# Patient Record
Sex: MALE | Race: WHITE | NOT HISPANIC OR LATINO | Employment: FULL TIME | URBAN - METROPOLITAN AREA
[De-identification: names, ages, dates, MRNs, and addresses within clinical notes are randomized per-mention and may not be internally consistent; named-entity substitution may affect disease eponyms.]

---

## 2017-03-03 ENCOUNTER — TELEPHONE (OUTPATIENT)
Dept: FAMILY MEDICINE | Facility: CLINIC | Age: 47
End: 2017-03-03

## 2017-03-07 NOTE — TELEPHONE ENCOUNTER
PA for Relpax was approved.  Pharmacy notified.     CaseId:[id number];Product Name:ST: Triptan (Brand) NoGF - Imitrex tablets, Amerge, Zomig tablets, Zomig ZMT, Maxalt, Maxalt MLT, Axert, Frova, Relpax, Treximet - LILY;Status:Approved;Coverage Start Date:02/04/2017;Coverage End Date:03/06/2018;

## 2017-08-22 DIAGNOSIS — G43.809 OTHER TYPE OF MIGRAINE: ICD-10-CM

## 2017-08-22 NOTE — TELEPHONE ENCOUNTER
Relpax      Last Written Prescription Date: 09/01/2016  Last Fill Quantity: 18, # refills: 11  Last Office Visit with [facility], [facility]MP or  Health prescribing provider: 09/01/2016  Next 5 appointments (look out 90 days)     Aug 28, 2017  4:20 PM CDT   SHORT with PILAR Don MD   [facility] ([facility])    [address] [address]   [phone]                   BP Readings from Last 3 Encounters:   09/01/16 126/82   01/12/16 (!) 135/94   08/03/15 110/80     Austin GREENE (R)

## 2017-08-24 RX ORDER — ELETRIPTAN HYDROBROMIDE 40 MG/1
TABLET, FILM COATED ORAL
Qty: 18 TABLET | Refills: 11 | Status: SHIPPED | OUTPATIENT
Start: 2017-08-24 | End: 2018-04-16

## 2017-08-28 ENCOUNTER — OFFICE VISIT (OUTPATIENT)
Dept: FAMILY MEDICINE | Facility: CLINIC | Age: 47
End: 2017-08-28
Payer: COMMERCIAL

## 2017-08-28 VITALS
DIASTOLIC BLOOD PRESSURE: 70 MMHG | HEART RATE: 64 BPM | BODY MASS INDEX: 28.14 KG/M2 | HEIGHT: 71 IN | WEIGHT: 201 LBS | SYSTOLIC BLOOD PRESSURE: 104 MMHG

## 2017-08-28 DIAGNOSIS — G43.809 OTHER TYPE OF MIGRAINE: ICD-10-CM

## 2017-08-28 DIAGNOSIS — I10 ESSENTIAL HYPERTENSION WITH GOAL BLOOD PRESSURE LESS THAN 140/90: ICD-10-CM

## 2017-08-28 PROCEDURE — 99214 OFFICE O/P EST MOD 30 MIN: CPT | Performed by: FAMILY MEDICINE

## 2017-08-28 RX ORDER — LOSARTAN POTASSIUM 100 MG/1
100 TABLET ORAL DAILY
Qty: 90 TABLET | Refills: 3 | Status: SHIPPED | OUTPATIENT
Start: 2017-08-28 | End: 2018-04-16

## 2017-08-28 RX ORDER — ELETRIPTAN HYDROBROMIDE 40 MG/1
TABLET, FILM COATED ORAL
Qty: 18 TABLET | Refills: 11 | Status: CANCELLED | OUTPATIENT
Start: 2017-08-28

## 2017-08-28 RX ORDER — HYDROCODONE BITARTRATE AND ACETAMINOPHEN 10; 325 MG/1; MG/1
1 TABLET ORAL EVERY 6 HOURS PRN
Qty: 28 TABLET | Refills: 0 | Status: SHIPPED | OUTPATIENT
Start: 2017-08-28 | End: 2018-04-16

## 2017-08-28 NOTE — PROGRESS NOTES
"  SUBJECTIVE:   Jossue Torres is a 47 year old male who presents to clinic today for the following health issues:      Hypertension Follow-up      Outpatient blood pressures are not being checked.    Low Salt Diet: not monitoring salt        Amount of exercise or physical activity: None    Problems taking medications regularly: No    Medication side effects: none  Diet: regular (no restrictions)      PROBLEMS TO ADD ON...  Migraine headache: The Relpax continues to work well as needed when he gets a headache. For some reason he had more than the usual amount of difficulty in July and had used all 18 of the Relpax tablets up before the month was out. He had some leftover hydrocodone from the prescription I gave him a year ago and that took care of the headaches. It seems to him that if he uses the Relpax frequently it loses its effectiveness somewhat. Then if he takes a break from it it'll work again      Problem list and histories reviewed & adjusted, as indicated.  Additional history: none        Reviewed and updated as needed this visit by clinical staffTobacco  Allergies  Med Hx  Surg Hx  Fam Hx  Soc Hx      Reviewed and updated as needed this visit by Provider               ROS:  Constitutional, HEENT, cardiovascular, pulmonary, gi and gu systems are negative, except as otherwise noted.          OBJECTIVE:                                                    /70 (BP Location: Right arm, Patient Position: Chair, Cuff Size: Adult Regular)  Pulse 64  Ht 5' 11\" (1.803 m)  Wt 201 lb (91.2 kg)  BMI 28.03 kg/m2    GENERAL: healthy, alert and no distress  EYES: Eyes grossly normal to inspection, extraocular movements - intact, and PERRL  NECK: no tenderness, no adenopathy, no asymmetry, no masses, no stiffness; thyroid- normal to palpation  RESP: lungs clear to auscultation - no rales, no rhonchi, no wheezes  CV: regular rates and rhythm, normal S1 S2, no S3 or S4 and no murmur, no click or rub -  MS: " extremities- no gross deformities noted, no edema  NEURO: strength and tone- normal, sensory exam- grossly normal, mentation- intact, speech- normal, reflexes- symmetric  PSYCH: Alert and oriented times 3; coherent speech, normal rate and volume, able to articulate logical thoughts, able to abstract reason, no tangential thoughts, no hallucinations or delusions. Affect is normal.         ASSESSMENT/PLAN:                                                    ASSESSMENT:  1. Other type of migraine    2. Essential hypertension with goal blood pressure less than 140/90        PLAN:  Orders Placed This Encounter     losartan (COZAAR) 100 MG tablet     HYDROcodone-acetaminophen (NORCO)  MG per tablet   Refills on the Relpax were given last week. Recheck in one year      PILAR Gardner [facility]

## 2017-08-28 NOTE — MR AVS SNAPSHOT
"              After Visit Summary   2017    Jossue Torres    MRN: [id number]           Patient Information     Date Of Birth          1970        Visit Information        Provider Department      2017 4:20 PM Florencia, PILAR Gardner MD [facility]        Today's Diagnoses     Other type of migraine        Essential hypertension with goal blood pressure less than 140/90           Follow-ups after your visit        Who to contact     If you have questions or need follow up information about today's clinic visit or your schedule please contact [facility] directly at [phone].  Normal or non-critical lab and imaging results will be communicated to you by Co[employer], letter or phone within 4 business days after the clinic has received the results. If you do not hear from us within 7 days, please contact the clinic through [employer]Prom[employer] or phone. If you have a critical or abnormal lab result, we will notify you by phone as soon as possible.  Submit refill requests through [employer] or call your pharmacy and they will forward the refill request to us. Please allow 3 business days for your refill to be completed.          Additional Information About Your Visit        MyChart Information     [employer] lets you send messages to your doctor, view your test results, renew your prescriptions, schedule appointments and more. To sign up, go to [email or web address] . Click on \"Log in\" on the left side of the screen, which will take you to the Welcome page. Then click on \"Sign up Now\" on the right side of the page.     You will be asked to enter the access code listed below, as well as some personal information. Please follow the directions to create your username and password.     Your access code is: [email or web address]  Expires: 2017  4:59 PM     Your access code will  in 90 days. If you need help or a new code, please call your [facility] or [phone].        Care EveryWhere ID     " "This is your Care EveryWhere ID. This could be used by other organizations to access your [place] medical records  [id number]        Your Vitals Were     Pulse Height BMI (Body Mass Index)             64 5' 11\" (1.803 m) 28.03 kg/m2          Blood Pressure from Last 3 Encounters:   08/28/17 104/70   09/01/16 126/82   01/12/16 (!) 135/94    Weight from Last 3 Encounters:   08/28/17 201 lb (91.2 kg)   09/01/16 196 lb (88.9 kg)   01/12/16 193 lb (87.5 kg)              Today, you had the following     No orders found for display         Where to get your medicines      These medications [facility] [place] [facility] [address]  [address], [place] [address]    Hours:  TA[place] Phone:  [phone]     losartan 100 MG tablet         Some of these will need a paper prescription and others can be bought over the counter.  Ask your nurse if you have questions.     Bring a paper prescription for each of these medications     HYDROcodone-acetaminophen  MG per tablet          Primary Care Provider Office Phone # Fax #    R Jj Don [phone] [address] [address]        Equal Access to Services     MIGUEL ANGEL REYNOLDS AH: Hadii suki mendoza hadasho Soomaali, waaxda luqadaha, qaybta kaalmada adeegyada, jf quiñones hayjovani snider. So [facility] [place] [phone].    ATENCIÓN: Si habla español, tiene a johnson disposición servicios gratuitos de asistencia lingüística. Llame al [phone].    We comply with applicable federal civil rights laws and [place] laws. We do not discriminate on the basis of race, color, national origin, age, disability sex, sexual orientation or gender identity.            Thank you!     Thank you for choosing [facility]  for your care. Our goal is always to provide you with excellent care. Hearing back from our patients is one way we can continue to improve our services. " Please take a few minutes to complete the written survey that you may receive in the mail after your visit with us. Thank you!             Your Updated Medication List - Protect others around you: Learn how to safely use, store and throw away your medicines at www.disposemymeds.org.          This list is accurate as of: 8/28/17  4:59 PM.  Always use your most recent med list.                   Brand Name Dispense Instructions for use Diagnosis    eletriptan 40 MG tablet    RELPAX    18 tablet    Take 1 tablet by mouth. repeat after 2 hours if needed.    Other type of migraine       HYDROcodone-acetaminophen  MG per tablet    NORCO    28 tablet    Take 1 tablet by mouth every 6 hours as needed for pain    Other type of migraine       losartan 100 MG tablet    COZAAR    90 tablet    Take 1 tablet (100 mg) by mouth daily    Essential hypertension with goal blood pressure less than 140/90       SUDAFED 12 HOUR PO      Take  by mouth.        triamcinolone 0.1 % cream    KENALOG    80 g    Apply  topically 2 times daily.    Psoriasis       TYLENOL 325 MG tablet   Generic drug:  acetaminophen      2 TABLETS EVERY 4 HOURS AS NEEDED

## 2017-08-28 NOTE — NURSING NOTE
"Chief Complaint   Patient presents with     Hypertension     Headache     migraines       Initial /70 (BP Location: Right arm, Patient Position: Chair, Cuff Size: Adult Regular)  Pulse 64  Ht 5' 11\" (1.803 m)  Wt 201 lb (91.2 kg)  BMI 28.03 kg/m2 Estimated body mass index is 28.03 kg/(m^2) as calculated from the following:    Height as of this encounter: 5' 11\" (1.803 m).    Weight as of this encounter: 201 lb (91.2 kg).  Medication Reconciliation: complete     Iliana Guillen, CMA      "

## 2017-09-08 ENCOUNTER — MEDICAL CORRESPONDENCE (OUTPATIENT)
Dept: HEALTH INFORMATION MANAGEMENT | Facility: CLINIC | Age: 47
End: 2017-09-08

## 2017-09-28 ENCOUNTER — TELEPHONE (OUTPATIENT)
Dept: FAMILY MEDICINE | Facility: CLINIC | Age: 47
End: 2017-09-28

## 2017-09-28 NOTE — TELEPHONE ENCOUNTER
Patient states his neck pain started 2 days ago.  Was mild and then today his neck pain is more painful and noticeable.  He states the pain is located in the muscle group in his neck.  Pain comes and goes.  Pain when turning head.  He states he may have slept on it wrong.  Denies fevers, radiating pain, chest pain, difficulties breathing, numbness/tingling, n/v, palpitations or any other symptoms at this time.    Appt scheduled.  RN advised of s/s that would warrant patient going to ED.  Patient agrees with plan and verbalized understanding.    Claudia CRABTREE RN

## 2017-09-28 NOTE — TELEPHONE ENCOUNTER
Reason for call:  Patient reporting a symptom    Symptom or request: Pt has had neck pain X 2 days and would like to see a provider in clinic today.      Duration (how long have symptoms been present): 2 days    Have you been treated for this before? No    Additional comments:     Phone Number patient can be reached at:  Home number on file [phone] (home)    Best Time:  any    Can we leave a detailed message on this number:  YES    Call taken on 9/28/2017 at 9:52 AM by Renee Pablo

## 2017-09-29 ENCOUNTER — OFFICE VISIT (OUTPATIENT)
Dept: FAMILY MEDICINE | Facility: CLINIC | Age: 47
End: 2017-09-29
Payer: COMMERCIAL

## 2017-09-29 VITALS
DIASTOLIC BLOOD PRESSURE: 88 MMHG | TEMPERATURE: 98.6 F | BODY MASS INDEX: 28 KG/M2 | WEIGHT: 200 LBS | HEART RATE: 75 BPM | HEIGHT: 71 IN | SYSTOLIC BLOOD PRESSURE: 122 MMHG

## 2017-09-29 DIAGNOSIS — S13.9XXA SPRAIN OF NECK, INITIAL ENCOUNTER: Primary | ICD-10-CM

## 2017-09-29 PROCEDURE — 99213 OFFICE O/P EST LOW 20 MIN: CPT | Performed by: FAMILY MEDICINE

## 2017-09-29 RX ORDER — OMEGA-3 FATTY ACIDS/FISH OIL 300-1000MG
200 CAPSULE ORAL EVERY 4 HOURS PRN
Status: ON HOLD | COMMUNITY
End: 2022-04-09

## 2017-09-29 RX ORDER — CYCLOBENZAPRINE HCL 10 MG
10 TABLET ORAL 3 TIMES DAILY PRN
Qty: 20 TABLET | Refills: 0 | Status: SHIPPED | OUTPATIENT
Start: 2017-09-29 | End: 2018-12-14

## 2017-09-29 NOTE — PATIENT INSTRUCTIONS
Thank you for choosing [facility].  You may be receiving a survey in the mail from [facility] regarding your visit today.  Please take a few minutes to complete and return the survey to let us know how we are doing.      Our Clinic hours are:  Mondays    7:20 am - 7 pm  Tues -  Fri  7:20 am - 5 pm    Clinic Phone: [phone]    The clinic lab opens at 7:30 am Mon - Fri and appointments are required.    [facility]. [phone]  Monday-Thursday 8 am - 7pm  Tues/Wed/Fri 8 am - 5:30 pm

## 2017-09-29 NOTE — MR AVS SNAPSHOT
"              After Visit Summary   9/29/2017    Jossue Torres    MRN: [id number]           Patient Information     Date Of Birth          1970        Visit Information        Provider Department      9/29/2017 7:40 AM Tyler Paz MD [facility]        Today's Diagnoses     Sprain of neck, initial encounter    -  1      Care Instructions          Thank you for choosing [facility].  You may be receiving a survey in the mail from [facility] regarding your visit today.  Please take a few minutes to complete and return the survey to let us know how we are doing.      Our Clinic hours are:  Mondays    7:20 am - 7 pm  Tues -  Fri  7:20 am - 5 pm    Clinic Phone: [phone]    The clinic lab opens at 7:30 am Mon - Fri and appointments are required.    [facility] [place]  Ph. [phone]  Monday-Thursday 8 am - 7pm  Tues/Wed/Fri 8 am - 5:30 pm                 Follow-ups after your visit        Who to contact     If you have questions or need follow up information about today's clinic visit or your schedule please contact [facility] directly at [phone].  Normal or non-critical lab and imaging results will be communicated to you by MyChart, letter or phone within 4 business days after the clinic has received the results. If you do not hear from us within 7 days, please contact the clinic through [employer] or phone. If you have a critical or abnormal lab result, we will notify you by phone as soon as possible.  Submit refill requests through [employer] or call your pharmacy and they will forward the refill request to us. Please allow 3 business days for your refill to be completed.          Additional Information About Your Visit        MyChart Information     [employer] lets you send messages to your doctor, view your test results, renew your prescriptions, schedule appointments and more. To sign up, go to [email or web address] . Click on \"Log in\" on the left " "side of the screen, which will take you to the Welcome page. Then click on \"Sign up Now\" on the right side of the page.     You will be asked to enter the access code listed below, as well as some personal information. Please follow the directions to create your username and password.     Your access code is: [email or web address]  Expires: 2017  4:59 PM     Your access code will  in 90 days. If you need help or a new code, please call your [facility] or [phone].        Care EveryWhere ID     This is your Care EveryWhere ID. This could be used by other organizations to access your [place] medical records  [id number]        Your Vitals Were     Pulse Temperature Height BMI (Body Mass Index)          75 98.6  F (37  C) 5' 11\" (1.803 m) 27.89 kg/m2         Blood Pressure from Last 3 Encounters:   17 122/88   17 104/70   16 126/82    Weight from Last 3 Encounters:   17 200 lb (90.7 kg)   17 201 lb (91.2 kg)   16 196 lb (88.9 kg)              Today, you had the following     No orders found for display         Today's Medication Changes          These changes are accurate as of: 17  8:36 AM.  If you have any questions, ask your nurse or doctor.               Start taking these medicines.        Dose/Directions    cyclobenzaprine 10 MG tablet   Commonly known as:  FLEXERIL   Used for:  Sprain of neck, initial encounter   Started by:  Tyler Paz MD        Dose:  10 mg   Take 1 tablet (10 mg) by mouth 3 times daily as needed for muscle spasms   Quantity:  20 tablet   Refills:  0            Where to get your medicines      [facility] [place] - [address]  [address] [place] [address] [address]    Hours:  [place] [employer] Phone:  [phone]     cyclobenzaprine 10 MG tablet                Primary Care Provider Office Phone # Fax #    PILAR Don [phone] [address] [facility] [address]        Equal Access to Services     MIGUEL ANGEL REYNOLDS : Hadii aad ku hadcarolynquincy Soálvaroali, wamelissada luqadaha, qaabisaita kadavis lavenregloriacorky, jf morrisnikkyisamar snider. So [facility] [phone].    ATENCIÓN: Si habla español, tiene a johnson disposición servicios gratuitos de asistencia lingüística. Llame al [phone].    We comply with applicable federal civil rights laws and [place] laws. We do not discriminate on the basis of race, color, national origin, age, disability sex, sexual orientation or gender identity.            Thank you!     Thank you for choosing [facility]  for your care. Our goal is always to provide you with excellent care. Hearing back from our patients is one way we can continue to improve our services. Please take a few minutes to complete the written survey that you may receive in the mail after your visit with us. Thank you!             Your Updated Medication List - Protect others around you: Learn how to safely use, store and throw away your medicines at www.disposemymeds.org.          This list is accurate as of: 9/29/17  8:36 AM.  Always use your most recent med list.                   Brand Name Dispense Instructions for use Diagnosis    cyclobenzaprine 10 MG tablet    FLEXERIL    20 tablet    Take 1 tablet (10 mg) by mouth 3 times daily as needed for muscle spasms    Sprain of neck, initial encounter       eletriptan 40 MG tablet    RELPAX    18 tablet    Take 1 tablet by mouth. repeat after 2 hours if needed.    Other type of migraine       HYDROcodone-acetaminophen  MG per tablet    NORCO    28 tablet    Take 1 tablet by mouth every 6 hours as needed for pain    Other type of migraine       ibuprofen 200 MG capsule      Take 200 mg by mouth every 4 hours as needed for fever        losartan 100 MG tablet    COZAAR    90 tablet    Take 1 tablet (100 mg) by mouth daily    Essential hypertension with goal blood pressure less  than 140/90       SUDAFED 12 HOUR PO      Take  by mouth.        triamcinolone 0.1 % cream    KENALOG    80 g    Apply  topically 2 times daily.    Psoriasis       TYLENOL 325 MG tablet   Generic drug:  acetaminophen      2 TABLETS EVERY 4 HOURS AS NEEDED

## 2017-09-29 NOTE — PROGRESS NOTES
"  SUBJECTIVE:   Jossue Torres is a 47 year old male who presents to clinic today for the following health issues:      Neck Pain  Onset: x 3 days     Description:   Location: left side   Radiation: into the left neck    Intensity: severe    Progression of Symptoms:  waxing and waning    Accompanying Signs & Symptoms:  Burning, prickly sensation (paresthesias) in arm(s): no   Numbness in arm(s): no   Weakness in arm(s):  no   Fever: no   Headache: YES  Nausea and/or vomiting: no     History:   Trauma: no   Previous neck pain: no   Previous surgery or injections: no   Previous Imaging (MRI,X ray): no     Precipitating factors:   Does movement increase the pain:  YES    Alleviating factors:  nothing     Therapies Tried and outcome:  IBU- no relief           Problem list and histories reviewed & adjusted, as indicated.  Additional history: as documented        Reviewed and updated as needed this visit by clinical staffTobacco  Allergies  Meds  Med Hx  Surg Hx  Fam Hx  Soc Hx      Reviewed and updated as needed this visit by Provider      Further history obtained, clarified or corrected by physician:  He woke up 3 days ago with a stiff neck on the left side which is new for him. Yesterday it was quite a bit worse with a lot of spasm. Today it seems to have improved a little bit but he is still getting spasm. He does not know of any specific injury.    OBJECTIVE:  /88  Pulse 75  Temp 98.6  F (37  C)  Ht 5' 11\" (1.803 m)  Wt 200 lb (90.7 kg)  BMI 27.89 kg/m2   NECK: Supple without adenopathy or thyromegaly, there is tenderness in the left paraspinous musculature and the left trapezius and there is some spasm there. Range of motion is limited because of pain.  /88  Pulse 75  Temp 98.6  F (37  C)  Ht 5' 11\" (1.803 m)  Wt 200 lb (90.7 kg)  BMI 27.89 kg/m2  LUNGS: clear to auscultation, normal breath sounds  CV: RRR without murmur  ABD: BS+, soft, nontender, no masses, no " hepatosplenomegaly    ASSESSMENT:  Sprain of neck, initial encounter    PLAN:  Gradual stretching exercises and range of motion exercises  Ice as needed  Return for worsening or persisting problems.    Orders Placed This Encounter     ibuprofen 200 MG capsule     cyclobenzaprine (FLEXERIL) 10 MG tablet

## 2017-09-29 NOTE — NURSING NOTE
"Chief Complaint   Patient presents with     Neck Pain       Initial /88  Pulse 75  Temp 98.6  F (37  C)  Ht 5' 11\" (1.803 m)  Wt 200 lb (90.7 kg)  BMI 27.89 kg/m2 Estimated body mass index is 27.89 kg/(m^2) as calculated from the following:    Height as of this encounter: 5' 11\" (1.803 m).    Weight as of this encounter: 200 lb (90.7 kg).  Medication Reconciliation: complete   Roya Montes CMA      "

## 2018-02-20 ENCOUNTER — TELEPHONE (OUTPATIENT)
Dept: FAMILY MEDICINE | Facility: CLINIC | Age: 48
End: 2018-02-20

## 2018-02-21 NOTE — TELEPHONE ENCOUNTER
"PA was completed for relpax 40 mg however insurance indicated, \"Drug is covered by current benefit plan. No further PA activity needed.\"  "

## 2018-04-16 ENCOUNTER — OFFICE VISIT (OUTPATIENT)
Dept: FAMILY MEDICINE | Facility: CLINIC | Age: 48
End: 2018-04-16
Payer: COMMERCIAL

## 2018-04-16 VITALS
TEMPERATURE: 97.8 F | DIASTOLIC BLOOD PRESSURE: 88 MMHG | HEIGHT: 71 IN | BODY MASS INDEX: 29.4 KG/M2 | SYSTOLIC BLOOD PRESSURE: 134 MMHG | RESPIRATION RATE: 18 BRPM | WEIGHT: 210 LBS | HEART RATE: 73 BPM

## 2018-04-16 DIAGNOSIS — I10 ESSENTIAL HYPERTENSION WITH GOAL BLOOD PRESSURE LESS THAN 140/90: ICD-10-CM

## 2018-04-16 DIAGNOSIS — G43.709 CHRONIC MIGRAINE WITHOUT AURA WITHOUT STATUS MIGRAINOSUS, NOT INTRACTABLE: Primary | ICD-10-CM

## 2018-04-16 DIAGNOSIS — H61.21 IMPACTED CERUMEN OF RIGHT EAR: ICD-10-CM

## 2018-04-16 PROCEDURE — 69209 REMOVE IMPACTED EAR WAX UNI: CPT | Mod: RT | Performed by: FAMILY MEDICINE

## 2018-04-16 PROCEDURE — 99214 OFFICE O/P EST MOD 30 MIN: CPT | Mod: 25 | Performed by: FAMILY MEDICINE

## 2018-04-16 RX ORDER — HYDROCODONE BITARTRATE AND ACETAMINOPHEN 10; 325 MG/1; MG/1
1 TABLET ORAL EVERY 6 HOURS PRN
Qty: 28 TABLET | Refills: 0 | Status: SHIPPED | OUTPATIENT
Start: 2018-04-16 | End: 2018-12-14

## 2018-04-16 RX ORDER — LOSARTAN POTASSIUM 100 MG/1
100 TABLET ORAL DAILY
Qty: 90 TABLET | Refills: 3 | Status: SHIPPED | OUTPATIENT
Start: 2018-04-16 | End: 2018-12-14

## 2018-04-16 RX ORDER — ELETRIPTAN HYDROBROMIDE 40 MG/1
TABLET, FILM COATED ORAL
Qty: 18 TABLET | Refills: 11 | Status: SHIPPED | OUTPATIENT
Start: 2018-04-16 | End: 2018-12-14

## 2018-04-16 ASSESSMENT — PAIN SCALES - GENERAL: PAINLEVEL: NO PAIN (0)

## 2018-04-16 NOTE — NURSING NOTE
"Chief Complaint   Patient presents with     Ear Problem     Patient noticed X 3 weeks ago in the shower he got water in it and try to get it out and has been bothered by it every since.      Hypertension     Medication Question     Generic Relpax is not helping like brand name.        Initial BP (!) 140/98  Pulse 70  Temp 97.8  F (36.6  C) (Tympanic)  Resp 18  Ht 5' 11\" (1.803 m)  Wt 210 lb (95.3 kg)  BMI 29.29 kg/m2 Estimated body mass index is 29.29 kg/(m^2) as calculated from the following:    Height as of this encounter: 5' 11\" (1.803 m).    Weight as of this encounter: 210 lb (95.3 kg).  Medication Reconciliation: complete    "

## 2018-04-16 NOTE — MR AVS SNAPSHOT
After Visit Summary   4/16/2018    Jossue Torres    MRN: [id number]           Patient Information     Date Of Birth          1970        Visit Information        Provider Department      4/16/2018 5:00 PM Florencia, PILAR Gardner MD [facility]        Today's Diagnoses     Chronic migraine without aura without status migrainosus, not intractable    -  1    Essential hypertension with goal blood pressure less than 140/90          Care Instructions          Thank you for choosing [facility].  You may be receiving a survey in the mail from Carolyn Castro regarding your visit today.  Please take a few minutes to complete and return the survey to let us know how we are doing.      Our Clinic hours are:  Mondays    7:20 am - 7 pm  Tues -  Fri  7:20 am - 5 pm    Clinic Phone: [phone]    The clinic lab opens at 7:30 am Mon - Fri and appointments are required.    [facility]  Ph. [phone]  Monday-Thursday 8 am - 7pm  Tues/Wed/Fri 8 am - 5:30 pm                 Follow-ups after your visit        Who to contact     If you have questions or need follow up information about today's clinic visit or your schedule please contact [facility] directly at [phone].  Normal or non-critical lab and imaging results will be communicated to you by MyChart, letter or phone within 4 business days after the clinic has received the results. If you do not hear from us within 7 days, please contact the clinic through MyChart or phone. If you have a critical or abnormal lab result, we will notify you by phone as soon as possible.  Submit refill requests through [employer] or call your pharmacy and they will forward the refill request to us. Please allow 3 business days for your refill to be completed.          Additional Information About Your Visit        [employer] Information     Shoutlet lets you send messages to your doctor, view your test results, renew your  "prescriptions, schedule appointments and more. To sign up, go to [email or web address] . Click on \"Log in\" on the left side of the screen, which will take you to the Welcome page. Then click on \"Sign up Now\" on the right side of the page.     You will be asked to enter the access code listed below, as well as some personal information. Please follow the directions to create your username and password.     Your access code is: [email or web address]  Expires: 7/15/2018  5:32 PM     Your access code will  in 90 days. If you need help or a new code, please call your [facility] clinic or [phone].        Care EveryWhere ID     This is your Care EveryWhere ID. This could be used by other organizations to access your [facility] medical records  [id number]        Your Vitals Were     Pulse Temperature Respirations Height BMI (Body Mass Index)       73 97.8  F (36.6  C) (Tympanic) 18 5' 11\" (1.803 m) 29.29 kg/m2        Blood Pressure from Last 3 Encounters:   18 (!) 134/92   17 122/88   17 104/70    Weight from Last 3 Encounters:   18 210 lb (95.3 kg)   17 200 lb (90.7 kg)   17 201 lb (91.2 kg)              Today, you had the following     No orders found for display         Where to get your medicines      These medications [facility] [address]  [address], [place] [address]    Hours:  TAYLOR GrecoTiq[facility]amira Salineno Phone:  [phone]     eletriptan 40 MG tablet    losartan 100 MG tablet         Some of these will need a paper prescription and others can be bought over the counter.  Ask your nurse if you have questions.     Bring a paper prescription for each of these medications     HYDROcodone-acetaminophen  MG per tablet          Primary Care Provider Office Phone # Fax #    R Jj Don [phone] [address] [address]        Equal Access to Services     MIGUEL ANGEL REYNOLDS " AH: Hadsydney escuderocarolynquincy Irma, waaxda luqadaha, qaybta kaalmada matty, jf bernardain hayaaleo galloway juan ramonisamar snider. So [facility] [phone].    ATENCIÓN: Si habla yamel, tiene a johnson disposición servicios gratuitos de asistencia lingüística. Llame al [phone].    We comply with applicable federal civil rights laws and [place] laws. We do not discriminate on the basis of race, color, national origin, age, disability, sex, sexual orientation, or gender identity.            Thank you!     Thank you for choosing [facility]  for your care. Our goal is always to provide you with excellent care. Hearing back from our patients is one way we can continue to improve our services. Please take a few minutes to complete the written survey that you may receive in the mail after your visit with us. Thank you!             Your Updated Medication List - Protect others around you: Learn how to safely use, store and throw away your medicines at www.disposemymeds.org.          This list is accurate as of 4/16/18  5:32 PM.  Always use your most recent med list.                   Brand Name Dispense Instructions for use Diagnosis    cyclobenzaprine 10 MG tablet    FLEXERIL    20 tablet    Take 1 tablet (10 mg) by mouth 3 times daily as needed for muscle spasms    Sprain of neck, initial encounter       eletriptan 40 MG tablet    RELPAX    18 tablet    Take 1 tablet by mouth. repeat after 2 hours if needed.    Chronic migraine without aura without status migrainosus, not intractable       HYDROcodone-acetaminophen  MG per tablet    NORCO    28 tablet    Take 1 tablet by mouth every 6 hours as needed for pain    Chronic migraine without aura without status migrainosus, not intractable       ibuprofen 200 MG capsule      Take 200 mg by mouth every 4 hours as needed for fever        losartan 100 MG tablet    COZAAR    90 tablet    Take 1 tablet (100 mg) by mouth daily    Essential hypertension with goal  blood pressure less than 140/90       triamcinolone 0.1 % cream    KENALOG    80 g    Apply  topically 2 times daily.    Psoriasis       TYLENOL 325 MG tablet   Generic drug:  acetaminophen      2 TABLETS EVERY 4 HOURS AS NEEDED

## 2018-04-16 NOTE — PROGRESS NOTES
SUBJECTIVE:   Jossue Torres is a 47 year old male who presents to clinic today for the following health issues:    Chief Complaint   Patient presents with     Ear Problem     Patient noticed X 3 weeks ago in the shower he got water in it and try to get it out and has been bothered by it every since.      Hypertension     Medication Question     Generic Relpax is not helping like brand name.      Medication Reconciliation     Patient is not taking losartan as prescribed. Patient's last dose was X 3 days ago.     No previous history of cerumen impaction.  His hearing is significantly less in the right ear.  Several times in the past he has gotten water in the ear while showering and eventually drains out in hearing was back to normal      Hypertension Follow-up      Outpatient blood pressures are not being checked.    Low Salt Diet: not monitoring salt      Amount of exercise or physical activity: None    Problems taking medications regularly: No    Medication side effects: none    Diet: regular (no restrictions)        PROBLEMS TO ADD ON...  Migraine headaches: He has gone to generic Relpax because it costs much less.  It seems like it does not work as well, so he had questions about the equivalency of generics    Problem list and histories reviewed & adjusted, as indicated.  Additional history: as documented          Reviewed and updated as needed this visit by clinical staff  Tobacco  Allergies  Meds  Med Hx  Surg Hx  Fam Hx  Soc Hx      Reviewed and updated as needed this visit by Provider       ROS:  General: No change in weight, sleep or appetite.  Normal energy.  No fever or chills  ENT: See above, otherwise negative  Resp: No coughing, wheezing or shortness of breath  CV: No chest pains or palpitations  GI: No nausea, vomiting,  heartburn, abdominal pain, diarrhea, constipation or change in bowel habits          OBJECTIVE:                                                    BP  134/88  Pulse 73  Temp  "97.8  F (36.6  C) (Tympanic)  Resp 18  Ht 5' 11\" (1.803 m)  Wt 210 lb (95.3 kg)  BMI 29.29 kg/m2    GENERAL: healthy, alert and no distress  EYES: Eyes grossly normal to inspection, extraocular movements - intact, and PERRL  HENT: cerumen right, obscuring the tympanic membrane; after irrigation the TM appeared normal and his hearing was restored  NECK: no tenderness, no adenopathy, no asymmetry, no masses, no stiffness; thyroid- normal to palpation  NEURO: Alert, oriented, speech and mentation normal, Cranial nerves 2-12 are normal., Strength normal and symmetrical in upper and lower extremities.  Gait normal  PSYCH: Alert and oriented times 3; coherent speech, normal rate and volume, able to articulate logical thoughts, able to abstract reason, no tangential thoughts, no hallucinations or delusions. Affect is normal.         ASSESSMENT/PLAN:                                                    ASSESSMENT:  1. Chronic migraine without aura without status migrainosus, not intractable    2. Essential hypertension with goal blood pressure less than 140/90    3. Impacted cerumen of right ear        PLAN:    The right ear canal was irrigated with warm water with successful removal of a large amount of cerumen  Orders Placed This Encounter     REMOVE IMPACTED CERUMEN     eletriptan (RELPAX) 40 MG tablet     losartan (COZAAR) 100 MG tablet     HYDROcodone-acetaminophen (NORCO)  MG per tablet     Really work hard at trying to be consistent with taking your losartan    Patient Instructions     Recheck in August for your regular exam and lab work      Thank you for choosing [facility].  You may be receiving a survey in the mail from [employer] regarding your visit today.  Please take a few minutes to complete and return the survey to let us know how we are doing.      Our Clinic hours are:  Mondays    7:20 am - 7 pm  Tues -  Fri  7:20 am - 5 pm    Clinic Phone: [phone]    The clinic lab opens at 7:30 am " Mon - Fri and appointments are required.    [facility]  Ph. [phone]  Monday-Thursday 8 am - 7pm  Tues/Wed/Fri 8 am - 5:30 pm             PILAR Don  [facility]

## 2018-04-16 NOTE — PATIENT INSTRUCTIONS
Thank you for choosing [facility].  You may be receiving a survey in the mail from [facility] regarding your visit today.  Please take a few minutes to complete and return the survey to let us know how we are doing.      Our Clinic hours are:  Mondays    7:20 am - 7 pm  Tues -  Fri  7:20 am - 5 pm    Clinic Phone: [phone]    The clinic lab opens at 7:30 am Mon - Fri and appointments are required.    [facility]. [phone]  Monday-Thursday 8 am - 7pm  Tues/Wed/Fri 8 am - 5:30 pm

## 2018-09-28 ENCOUNTER — TRANSFERRED RECORDS (OUTPATIENT)
Dept: HEALTH INFORMATION MANAGEMENT | Facility: CLINIC | Age: 48
End: 2018-09-28

## 2018-09-28 LAB
CHOLEST SERPL-MCNC: 268 MG/DL (ref 125–199)
GLUCOSE SERPL-MCNC: 78 MG/DL (ref 70–99)
HDLC SERPL-MCNC: 40 MG/DL
LDLC SERPL CALC-MCNC: 164 MG/DL

## 2018-12-14 ENCOUNTER — OFFICE VISIT (OUTPATIENT)
Dept: FAMILY MEDICINE | Facility: CLINIC | Age: 48
End: 2018-12-14
Payer: COMMERCIAL

## 2018-12-14 VITALS
WEIGHT: 206.2 LBS | DIASTOLIC BLOOD PRESSURE: 70 MMHG | OXYGEN SATURATION: 97 % | HEART RATE: 70 BPM | SYSTOLIC BLOOD PRESSURE: 110 MMHG | TEMPERATURE: 97.3 F | RESPIRATION RATE: 16 BRPM | HEIGHT: 71 IN | BODY MASS INDEX: 28.87 KG/M2

## 2018-12-14 DIAGNOSIS — I10 ESSENTIAL HYPERTENSION WITH GOAL BLOOD PRESSURE LESS THAN 140/90: Primary | ICD-10-CM

## 2018-12-14 DIAGNOSIS — G43.709 CHRONIC MIGRAINE WITHOUT AURA WITHOUT STATUS MIGRAINOSUS, NOT INTRACTABLE: ICD-10-CM

## 2018-12-14 PROCEDURE — 99214 OFFICE O/P EST MOD 30 MIN: CPT | Performed by: FAMILY MEDICINE

## 2018-12-14 RX ORDER — ELETRIPTAN HYDROBROMIDE 40 MG/1
TABLET, FILM COATED ORAL
Qty: 18 TABLET | Refills: 11 | Status: SHIPPED | OUTPATIENT
Start: 2018-12-14 | End: 2019-12-11

## 2018-12-14 RX ORDER — LOSARTAN POTASSIUM 100 MG/1
100 TABLET ORAL DAILY
Qty: 90 TABLET | Refills: 3 | Status: SHIPPED | OUTPATIENT
Start: 2018-12-14 | End: 2019-12-11

## 2018-12-14 ASSESSMENT — MIFFLIN-ST. JEOR: SCORE: 1822.45

## 2018-12-14 NOTE — PROGRESS NOTES
"  SUBJECTIVE:   Jossue Torres is a 48 year old male who presents to clinic today for the following health issues:      Hypertension Follow-up      Outpatient blood pressures are not being checked.    Low Salt Diet: not monitoring salt      Amount of exercise or physical activity: 2-3 days/week for an average of 15-30 minutes    Problems taking medications regularly: No    Medication side effects: none    Diet: regular (no restrictions)    Some of his readings taken at work were borderline elevated.  He thought he should come in and have it checked here and decide if he needs an adjustment in his medication    Migraine headaches: These have been better the last 6 months and he has not needed to use the Relpax as often        Problem list and histories reviewed & adjusted, as indicated.  Additional history: none        Reviewed and updated as needed this visit by clinical staff  Tobacco  Allergies  Meds       Reviewed and updated as needed this visit by Provider               ROS:  Constitutional, HEENT, cardiovascular, pulmonary, gi and gu systems are negative, except as otherwise noted.        OBJECTIVE:                                                    /70   Pulse 70   Temp 97.3  F (36.3  C) (Tympanic)   Resp 16   Ht 1.803 m (5' 11\")   Wt 93.5 kg (206 lb 3.2 oz)   SpO2 97%   BMI 28.76 kg/m      GENERAL: healthy, alert and no distress  EYES: Eyes grossly normal to inspection, extraocular movements - intact, and PERRL  NECK: no tenderness, no adenopathy, no asymmetry, no masses, no stiffness; thyroid- normal to palpation  RESP: lungs clear to auscultation - no rales, no rhonchi, no wheezes  CV: regular rates and rhythm, normal S1 S2, no S3 or S4 and no murmur, no click or rub -  MS: extremities- no gross deformities noted, no edema  NEURO: strength and tone- normal, sensory exam- grossly normal, mentation- intact, speech- normal, reflexes- symmetric       He had a lipid panel in glucose drawn through " his work.  This will be abstracted into the EMR.  His lipids were moderately elevated, glucose was normal  ASSESSMENT/PLAN:                                                    ASSESSMENT:  1. Essential hypertension with goal blood pressure less than 140/90    2. Chronic migraine without aura without status migrainosus, not intractable        PLAN:  Orders Placed This Encounter     losartan (COZAAR) 100 MG tablet     eletriptan (RELPAX) 40 MG tablet       Patient Instructions   Come back if BP is consistently over 140/90      PILAR Don  [facility]

## 2019-08-19 ENCOUNTER — TRANSFERRED RECORDS (OUTPATIENT)
Dept: HEALTH INFORMATION MANAGEMENT | Facility: CLINIC | Age: 49
End: 2019-08-19

## 2019-08-19 LAB
CHOLEST SERPL-MCNC: 255 MG/DL (ref 125–199)
GLUCOSE SERPL-MCNC: 87 MG/DL (ref 70–99)
HDLC SERPL-MCNC: 48 MG/DL
LDLC SERPL CALC-MCNC: 173 MG/DL

## 2019-12-11 ENCOUNTER — OFFICE VISIT (OUTPATIENT)
Dept: FAMILY MEDICINE | Facility: CLINIC | Age: 49
End: 2019-12-11
Payer: COMMERCIAL

## 2019-12-11 VITALS
BODY MASS INDEX: 28.98 KG/M2 | RESPIRATION RATE: 15 BRPM | WEIGHT: 207 LBS | HEIGHT: 71 IN | HEART RATE: 91 BPM | SYSTOLIC BLOOD PRESSURE: 120 MMHG | DIASTOLIC BLOOD PRESSURE: 88 MMHG | TEMPERATURE: 98 F | OXYGEN SATURATION: 99 %

## 2019-12-11 DIAGNOSIS — I10 ESSENTIAL HYPERTENSION WITH GOAL BLOOD PRESSURE LESS THAN 140/90: Primary | ICD-10-CM

## 2019-12-11 DIAGNOSIS — E78.5 HYPERLIPIDEMIA LDL GOAL <160: ICD-10-CM

## 2019-12-11 DIAGNOSIS — G43.709 CHRONIC MIGRAINE WITHOUT AURA WITHOUT STATUS MIGRAINOSUS, NOT INTRACTABLE: ICD-10-CM

## 2019-12-11 DIAGNOSIS — I10 ESSENTIAL HYPERTENSION WITH GOAL BLOOD PRESSURE LESS THAN 140/90: ICD-10-CM

## 2019-12-11 LAB
ANION GAP SERPL CALCULATED.3IONS-SCNC: 5 MMOL/L (ref 3–14)
BUN SERPL-MCNC: 9 MG/DL (ref 7–30)
CALCIUM SERPL-MCNC: 8.9 MG/DL (ref 8.5–10.1)
CHLORIDE SERPL-SCNC: 109 MMOL/L (ref 94–109)
CO2 SERPL-SCNC: 27 MMOL/L (ref 20–32)
CREAT SERPL-MCNC: 1.18 MG/DL (ref 0.66–1.25)
GFR SERPL CREATININE-BSD FRML MDRD: 71 ML/MIN/{1.73_M2}
GLUCOSE SERPL-MCNC: 93 MG/DL (ref 70–99)
POTASSIUM SERPL-SCNC: 4.1 MMOL/L (ref 3.4–5.3)
SODIUM SERPL-SCNC: 141 MMOL/L (ref 133–144)

## 2019-12-11 PROCEDURE — 99214 OFFICE O/P EST MOD 30 MIN: CPT | Performed by: NURSE PRACTITIONER

## 2019-12-11 PROCEDURE — 80048 BASIC METABOLIC PNL TOTAL CA: CPT | Performed by: NURSE PRACTITIONER

## 2019-12-11 PROCEDURE — 36415 COLL VENOUS BLD VENIPUNCTURE: CPT | Performed by: NURSE PRACTITIONER

## 2019-12-11 RX ORDER — HYDROCODONE BITARTRATE AND ACETAMINOPHEN 5; 325 MG/1; MG/1
1 TABLET ORAL EVERY 6 HOURS PRN
Qty: 28 TABLET | Refills: 0 | Status: SHIPPED | OUTPATIENT
Start: 2019-12-11 | End: 2021-01-04

## 2019-12-11 RX ORDER — ELETRIPTAN HYDROBROMIDE 40 MG/1
TABLET, FILM COATED ORAL
Qty: 18 TABLET | Refills: 11 | Status: SHIPPED | OUTPATIENT
Start: 2019-12-11 | End: 2021-01-04

## 2019-12-11 RX ORDER — LOSARTAN POTASSIUM 100 MG/1
100 TABLET ORAL DAILY
Qty: 90 TABLET | Refills: 3 | Status: SHIPPED | OUTPATIENT
Start: 2019-12-11 | End: 2021-01-04

## 2019-12-11 RX ORDER — LOSARTAN POTASSIUM 100 MG/1
100 TABLET ORAL DAILY
Qty: 90 TABLET | Refills: 3 | Status: CANCELLED | OUTPATIENT
Start: 2019-12-11

## 2019-12-11 RX ORDER — CYCLOBENZAPRINE HCL 10 MG
10 TABLET ORAL 3 TIMES DAILY PRN
Qty: 20 TABLET | Refills: 0 | Status: CANCELLED | OUTPATIENT
Start: 2019-12-11

## 2019-12-11 RX ORDER — SIMVASTATIN 40 MG
40 TABLET ORAL AT BEDTIME
Qty: 90 TABLET | Refills: 3 | Status: SHIPPED | OUTPATIENT
Start: 2019-12-11 | End: 2021-01-04

## 2019-12-11 ASSESSMENT — MIFFLIN-ST. JEOR: SCORE: 1821.08

## 2019-12-11 NOTE — PROGRESS NOTES
Jossue Torres is a 49 year old male who presents to clinic today for the following health issues:    HPI     Chief Complaint   Patient presents with     Hypertension     med refill      Headache     would like refill for his Relpax for when he gets Migraine headaches.  He has also in the past taken Flexeril when the Relpax does not work and would like to get a refill on that if possible.      He was put on relpax when no other medication was working for his headaches.  He also gets a one time prescription a year for hydrocodone in case he runs out of his relpax for the month so he does not end up in ER.    Hypertension Follow-up      Do you check your blood pressure regularly outside of the clinic? No     Are you following a low salt diet? No    Are your blood pressures ever more than 140 on the top number (systolic) OR more   than 90 on the bottom number (diastolic), for example 140/90? Unknown      How many servings of fruits and vegetables do you eat daily?  2-3    On average, how many sweetened beverages do you drink each day (Examples: soda, juice, sweet tea, etc.  Do NOT count diet or artificially sweetened beverages)?   4  How many days per week do you miss taking your medication? 2 times weekly     What makes it hard for you to take your medications?  remembering to take     Does miss doses once or twice or a week.    Uses Relpax 18 tabs per month and this month ran out.  Was started on Cozaar for migraines also.    Patient Active Problem List   Diagnosis     Chronic migraine without aura without status migrainosus, not intractable     Esophageal reflux     Tobacco use disorder     CARDIOVASCULAR SCREENING; LDL GOAL LESS THAN 160     Hypertension goal BP (blood pressure) < 140/90     Anxiety     Past Surgical History:   Procedure Laterality Date     SURGICAL HISTORY OF -   9/9/69    right inguinal hernioplasty     SURGICAL HISTORY OF -   11/16/93    lysis of two penile adhesions and cauterization of penile  "condyloma.       Social History     Tobacco Use     Smoking status: Current Every Day Smoker     Packs/day: 0.50     Years: 15.00     Pack years: 7.50     Types: Cigarettes     Smokeless tobacco: Former User     Types: Chew   Substance Use Topics     Alcohol use: Yes     Comment: rare     Family History   Problem Relation Age of Onset     Hypertension Father      C.A.D. Maternal Uncle         40's     C.A.D. Maternal Aunt         40's     C.A.D. Maternal Grandmother         40's     Hypertension Brother          Current Outpatient Medications   Medication Sig Dispense Refill     eletriptan (RELPAX) 40 MG tablet Take 1 tablet by mouth. repeat after 2 hours if needed. 18 tablet 11     HYDROcodone-acetaminophen (NORCO) 5-325 MG tablet Take 1 tablet by mouth every 6 hours as needed for severe pain 28 tablet 0     ibuprofen 200 MG capsule Take 200 mg by mouth every 4 hours as needed for fever       losartan (COZAAR) 100 MG tablet Take 1 tablet (100 mg) by mouth daily 90 tablet 3     simvastatin (ZOCOR) 40 MG tablet Take 1 tablet (40 mg) by mouth At Bedtime 90 tablet 3     TYLENOL 325 MG OR TABS 2 TABLETS EVERY 4 HOURS AS NEEDED       Allergies   Allergen Reactions     Nkda [No Known Drug Allergies]        Reviewed and updated as needed this visit by Provider  Tobacco  Allergies  Meds  Problems  Med Hx  Surg Hx  Fam Hx         Review of Systems   ROS COMP: CONSTITUTIONAL: NEGATIVE for fever, chills, change in weight  RESP: NEGATIVE for significant cough or SOB  CV: NEGATIVE for chest pain, palpitations or peripheral edema  PSYCHIATRIC: NEGATIVE for changes in mood or affect  ROS otherwise negative      Objective    /88   Pulse 91   Temp 98  F (36.7  C) (Tympanic)   Resp 15   Ht 1.803 m (5' 11\")   Wt 93.9 kg (207 lb)   SpO2 99%   BMI 28.87 kg/m    Body mass index is 28.87 kg/m .  Physical Exam   GENERAL: healthy, alert and no distress  RESP: lungs clear to auscultation - no rales, rhonchi or " wheezes  CV: regular rate and rhythm, normal S1 S2, no S3 or S4, no murmur, click or rub, no peripheral edema and peripheral pulses strong  PSYCH: mentation appears normal, affect normal/bright    Diagnostic Test Results:  Labs reviewed in Epic        Assessment & Plan     1. Essential hypertension with goal blood pressure less than 140/90  Ordered BMP for evaluation.  BP is stable today.  Refill on BP med for 1 year.  Follow-up recommended in 1 year for refills.  - losartan (COZAAR) 100 MG tablet; Take 1 tablet (100 mg) by mouth daily  Dispense: 90 tablet; Refill: 3  - Basic metabolic panel  (Ca, Cl, CO2, Creat, Gluc, K, Na, BUN)    2. Chronic migraine without aura without status migrainosus, not intractable  Stable.  Refill given on Relpax for 1 year.  Reviewed patient under PDMP and there were no controlled substances ordered in the last year.  Filled hydrocodone one time for the year and discussed that he will need to follow-up in clinic if any further need for this medication.  - eletriptan (RELPAX) 40 MG tablet; Take 1 tablet by mouth. repeat after 2 hours if needed.  Dispense: 18 tablet; Refill: 11  - HYDROcodone-acetaminophen (NORCO) 5-325 MG tablet; Take 1 tablet by mouth every 6 hours as needed for severe pain  Dispense: 28 tablet; Refill: 0    3. Hyperlipidemia LDL goal <160  Patient has labs with today showing cholesterol and glucose from his works biometric screening.  This will be scanned into the chart.  With patients age, smoking status, and cholesterol numbers, he is over 30% risk for CVD event in the next ten years on [place] scale.  Started Simvastatin 40 mg daily.  Recommend follow-up in 1 year for refills or sooner if any side effects to this medication for change in medication.  I have placed a future order for repeat lipids in 1 month.  Patient will make appointment to see if this is improving.  - simvastatin (ZOCOR) 40 MG tablet; Take 1 tablet (40 mg) by mouth At Bedtime  Dispense: 90  "tablet; Refill: 3  - Lipid panel reflex to direct LDL Fasting; Future     Tobacco Cessation:   reports that he has been smoking cigarettes. He has a 7.50 pack-year smoking history. He has quit using smokeless tobacco.  His smokeless tobacco use included chew.  Tobacco Cessation Action Plan: Information offered: Patient not interested at this time      BMI:   Estimated body mass index is 28.87 kg/m  as calculated from the following:    Height as of this encounter: 1.803 m (5' 11\").    Weight as of this encounter: 93.9 kg (207 lb).   Weight management plan: Discussed healthy diet and exercise guidelines        See Patient Instructions    Return in about 1 month (around 1/11/2020) for Lab Work.    Shae Rico NP  [facility]      "

## 2019-12-11 NOTE — LETTER
December 13, 2019      Jossue Torres  [address] [address] [address]        Dear ,    We are writing to inform you of your test results.    Labs came back normal.     Resulted Orders   Basic metabolic panel  (Ca, Cl, CO2, Creat, Gluc, K, Na, BUN)   Result Value Ref Range    Sodium 141 133 - 144 mmol/L    Potassium 4.1 3.4 - 5.3 mmol/L    Chloride 109 94 - 109 mmol/L    Carbon Dioxide 27 20 - 32 mmol/L    Anion Gap 5 3 - 14 mmol/L    Glucose 93 70 - 99 mg/dL    Urea Nitrogen 9 7 - 30 mg/dL    Creatinine 1.18 0.66 - 1.25 mg/dL    GFR Estimate 71 >60 mL/min/[1.73_m2]      Comment:      Non  GFR Calc  Starting 12/18/2018, serum creatinine based estimated GFR (eGFR) will be   calculated using the Chronic Kidney Disease Epidemiology Collaboration   (CKD-EPI) equation.      GFR Estimate If Black 83 >60 mL/min/[1.73_m2]      Comment:       GFR Calc  Starting 12/18/2018, serum creatinine based estimated GFR (eGFR) will be   calculated using the Chronic Kidney Disease Epidemiology Collaboration   (CKD-EPI) equation.      Calcium 8.9 8.5 - 10.1 mg/dL   If you have any questions or concerns, please call the clinic at the number listed above.     Sincerely,      Shae Rico NP

## 2019-12-11 NOTE — PATIENT INSTRUCTIONS
1.  Medications refilled.  2.  Lab today.  3.  Make appointment in 1 month in lab to repeat fasting Lipid testing.

## 2019-12-26 ENCOUNTER — TELEPHONE (OUTPATIENT)
Dept: FAMILY MEDICINE | Facility: CLINIC | Age: 49
End: 2019-12-26

## 2019-12-26 DIAGNOSIS — Z12.11 SPECIAL SCREENING FOR MALIGNANT NEOPLASMS, COLON: Primary | ICD-10-CM

## 2019-12-26 NOTE — TELEPHONE ENCOUNTER
Panel Management Review      Patient has the following on his problem list:     Hypertension   Last three blood pressure readings:  BP Readings from Last 3 Encounters:   12/11/19 120/88   12/14/18 110/70   04/16/18 134/88     Blood pressure: Passed    HTN Guidelines:  Less than 140/90      Composite cancer screening  Chart review shows that this patient is due/due soon for the following colon screening  Summary:    Patient is due/failing the following:   Colon screening    Action needed:   Patient needs referral/order: colonscopy    Type of outreach:    Phone, spoke to patient.  agreed to fit test. will  at .     Questions for provider review:    None                                                                                                                                    Blossom Bennett MA       Chart routed to Care Team .

## 2021-01-04 ENCOUNTER — OFFICE VISIT (OUTPATIENT)
Dept: FAMILY MEDICINE | Facility: CLINIC | Age: 51
End: 2021-01-04
Payer: COMMERCIAL

## 2021-01-04 VITALS
SYSTOLIC BLOOD PRESSURE: 144 MMHG | HEART RATE: 84 BPM | WEIGHT: 208 LBS | HEIGHT: 71 IN | BODY MASS INDEX: 29.12 KG/M2 | OXYGEN SATURATION: 98 % | DIASTOLIC BLOOD PRESSURE: 90 MMHG | TEMPERATURE: 97.9 F | RESPIRATION RATE: 18 BRPM

## 2021-01-04 DIAGNOSIS — I10 ESSENTIAL HYPERTENSION WITH GOAL BLOOD PRESSURE LESS THAN 140/90: ICD-10-CM

## 2021-01-04 DIAGNOSIS — G43.709 CHRONIC MIGRAINE WITHOUT AURA WITHOUT STATUS MIGRAINOSUS, NOT INTRACTABLE: ICD-10-CM

## 2021-01-04 DIAGNOSIS — E78.5 HYPERLIPIDEMIA LDL GOAL <160: ICD-10-CM

## 2021-01-04 DIAGNOSIS — Z12.11 SCREEN FOR COLON CANCER: Primary | ICD-10-CM

## 2021-01-04 LAB
ANION GAP SERPL CALCULATED.3IONS-SCNC: 3 MMOL/L (ref 3–14)
BUN SERPL-MCNC: 14 MG/DL (ref 7–30)
CALCIUM SERPL-MCNC: 8.4 MG/DL (ref 8.5–10.1)
CHLORIDE SERPL-SCNC: 110 MMOL/L (ref 94–109)
CHOLEST SERPL-MCNC: 216 MG/DL
CO2 SERPL-SCNC: 25 MMOL/L (ref 20–32)
CREAT SERPL-MCNC: 1.15 MG/DL (ref 0.66–1.25)
GFR SERPL CREATININE-BSD FRML MDRD: 73 ML/MIN/{1.73_M2}
GLUCOSE SERPL-MCNC: 81 MG/DL (ref 70–99)
HDLC SERPL-MCNC: 49 MG/DL
LDLC SERPL CALC-MCNC: 127 MG/DL
NONHDLC SERPL-MCNC: 167 MG/DL
POTASSIUM SERPL-SCNC: 3.8 MMOL/L (ref 3.4–5.3)
SODIUM SERPL-SCNC: 138 MMOL/L (ref 133–144)
TRIGL SERPL-MCNC: 200 MG/DL

## 2021-01-04 PROCEDURE — 80048 BASIC METABOLIC PNL TOTAL CA: CPT | Performed by: FAMILY MEDICINE

## 2021-01-04 PROCEDURE — 36415 COLL VENOUS BLD VENIPUNCTURE: CPT | Performed by: FAMILY MEDICINE

## 2021-01-04 PROCEDURE — 99214 OFFICE O/P EST MOD 30 MIN: CPT | Performed by: FAMILY MEDICINE

## 2021-01-04 PROCEDURE — 80061 LIPID PANEL: CPT | Performed by: FAMILY MEDICINE

## 2021-01-04 RX ORDER — LOSARTAN POTASSIUM 100 MG/1
100 TABLET ORAL DAILY
Qty: 90 TABLET | Refills: 3 | Status: SHIPPED | OUTPATIENT
Start: 2021-01-04 | End: 2021-12-09

## 2021-01-04 RX ORDER — SIMVASTATIN 40 MG
40 TABLET ORAL AT BEDTIME
Qty: 90 TABLET | Refills: 3 | Status: SHIPPED | OUTPATIENT
Start: 2021-01-04 | End: 2021-12-09

## 2021-01-04 RX ORDER — ELETRIPTAN HYDROBROMIDE 40 MG/1
TABLET, FILM COATED ORAL
Qty: 18 TABLET | Refills: 11 | Status: SHIPPED | OUTPATIENT
Start: 2021-01-04 | End: 2021-12-09

## 2021-01-04 RX ORDER — HYDROCODONE BITARTRATE AND ACETAMINOPHEN 5; 325 MG/1; MG/1
1 TABLET ORAL EVERY 6 HOURS PRN
Qty: 28 TABLET | Refills: 0 | Status: SHIPPED | OUTPATIENT
Start: 2021-01-04 | End: 2021-10-12

## 2021-01-04 ASSESSMENT — MIFFLIN-ST. JEOR: SCORE: 1820.61

## 2021-01-04 NOTE — PROGRESS NOTES
Subjective     Jossue Torres is a 50 year old male who presents to clinic today for the following health issues  accompanied by his self :    HPI   Chief Complaint   Patient presents with     Lipids     Hypertension     Pain Management     Jaw Pain     Blood Draw     patient is not fasting            Hyperlipidemia Follow-Up      Are you regularly taking any medication or supplement to lower your cholesterol?   Yes- simvasatin     Are you having muscle aches or other side effects that you think could be caused by your cholesterol lowering medication?  No    Hypertension Follow-up      Do you check your blood pressure regularly outside of the clinic? No     Are you following a low salt diet? No    Are your blood pressures ever more than 140 on the top number (systolic) OR more   than 90 on the bottom number (diastolic), for example 140/90? Does not take bp     Migraine     Since your last clinic visit, how have your headaches changed?  No change    How often are you getting headaches or migraines? Every other day      Are you able to do normal daily activities when you have a migraine? Yes    Are you taking rescue/relief medications? (Select all that apply) Tylenol, Excedrin and Relpax    How helpful is your rescue/relief medication?  I get total relief from relpax but not the hydrocodone     Are you taking any medications to prevent migraines? (Select all that apply)      In the past 4 weeks, how often have you gone to urgent care or the emergency room because of your headaches?  0      How many servings of fruits and vegetables do you eat daily?  0-1    On average, how many sweetened beverages do you drink each day (Examples: soda, juice, sweet tea, etc.  Do NOT count diet or artificially sweetened beverages)?   8 pop    How many days per week do you exercise enough to make your heart beat faster? 3 or less    How many minutes a day do you exercise enough to make your heart beat faster? 9 or less    How many  days per week do you miss taking your medication? 0    Concern - jar pain R side   Onset: 5/2020  Description: Patient is having R side jaw pain, started with some dental work being done.Patient had a root canal and that still did not relieve the pain,it increases with cold and jaw movement decreases with pressure on the jaw   Intensity: severe on and off   Progression of Symptoms:  same  Accompanying Signs & Symptoms: at the time of dental work was done   Previous history of similar problem: none   Precipitating factors:        Worsened by: cold talking and jaw movement   Alleviating factors:        Improved by: with pressure on the jaw   Therapies tried and outcome: Patient has been to the dentist and had a root canal done, that did not relieve the pain         Review of Systems   Constitutional, HEENT, cardiovascular, pulmonary, GI, , musculoskeletal, neuro, skin, endocrine and psych systems are negative, except as otherwise noted.      Objective    There were no vitals taken for this visit.  There is no height or weight on file to calculate BMI.  Physical Exam   GENERAL: healthy, alert and no distress  EYES: Eyes grossly normal to inspection, PERRL and conjunctivae and sclerae normal  HENT: ear canals and TM's normal, nose and mouth without ulcers or lesions  NECK: no adenopathy, no asymmetry, masses, or scars and thyroid normal to palpation  RESP: lungs clear to auscultation - no rales, rhonchi or wheezes  CV: regular rate and rhythm, normal S1 S2, no S3 or S4, no murmur, click or rub, no peripheral edema and peripheral pulses strong  ABDOMEN: soft, nontender, no hepatosplenomegaly, no masses and bowel sounds normal  MS: no gross musculoskeletal defects noted, no edema  SKIN: no suspicious lesions or rashes  NEURO: Normal strength and tone, mentation intact and speech normal  PSYCH: mentation appears normal, affect normal/bright        ASSESSMENT:     Chronic migraine without aura without status migrainosus,  not intractable  Essential hypertension with goal blood pressure less than 140/90  Hyperlipidemia LDL goal <160  Screen for colon cancer    PLAN:  Orders Placed This Encounter     Basic metabolic panel     Lipid panel reflex to direct LDL Non-fasting     Fecal colorectal cancer screen (FIT)     eletriptan (RELPAX) 40 MG tablet     HYDROcodone-acetaminophen (NORCO) 5-325 MG tablet     losartan (COZAAR) 100 MG tablet     simvastatin (ZOCOR) 40 MG tablet

## 2021-10-09 ENCOUNTER — NURSE TRIAGE (OUTPATIENT)
Dept: NURSING | Facility: CLINIC | Age: 51
End: 2021-10-09

## 2021-10-09 NOTE — TELEPHONE ENCOUNTER
Triage call    Patient called with pain Lower right side of jaw. Feels like electric shock that last a minute or so and then throbbing pain 5/10 all the time he also has tingling down his right arm at times.      Per protocol see PCP within 24 hours  Or 2nd level triage. Paged Dr Hicks  She responded.  She she felt it sounded like trigeminal Neuralgia and should see his PCP and it is not Urgent  But if pain becomes unbearable he can seek Urgent care.  Patient called back and transferred to [facility]. Care advice given.  Verbalizes understanding and agrees with plan.    Aruna Beck RN   [facility] Nurse Advisor  1:15 PM 10/9/2021    COVID 19 Nurse Triage Plan/Patient Instructions    Please be aware that novel coronavirus (COVID-19) may be circulating in the community. If you develop symptoms such as fever, cough, or SOB or if you have concerns about the presence of another infection including coronavirus (COVID-19), please contact your health care provider or visit [email or web address].     Disposition/Instructions    In-Person Visit with provider recommended. Reference Visit Selection Guide.    Thank you for taking steps to prevent the spread of this virus.  o Limit your contact with others.  o Wear a simple mask to cover your cough.  o Wash your hands well and often.    Resources    [facility]: About COVID-19: [email or web address]    CDC: What to Do If You're Sick: www.cdc.gov/coronavirus/2019-ncov/about/steps-when-sick.html    CDC: Ending Home Isolation: www.cdc.gov/coronavirus/2019-ncov/hcp/disposition-in-home-patients.html     CDC: Caring for Someone: www.cdc.gov/coronavirus/2019-ncov/if-you-are-sick/care-for-someone.html     [facility]: Interim Guidance for Hospital Discharge to Home: www.health.[facility].[place].us/diseases/coronavirus/hcp/hospdischarge.pdf    [facility] clinical trials (COVID-19 research studies): [email or web address]     Below  "are the COVID-19 hotlines at the [place] Department of [facility] ([facility]). Interpreters are available.   o For health questions: Call 390-659-1030 or 1-746.865.2459 (7 a.m. to 7 p.m.)  o For questions about schools and childcare: Call 925-657-7352 or 1-876.964.4341 (7 a.m. to 7 p.m.)     Reason for Disposition    Face pain present > 24 hours    Additional Information    Negative: Shock suspected (e.g., cold/pale/clammy skin, too weak to stand, low BP, rapid pulse)    Negative: [1] Similar pain previously AND [2] it was from \"heart attack\"    Negative: [1] Similar pain previously AND [2] it was from \"angina\" AND [3] not relieved by nitroglycerin    Negative: Sounds like a life-threatening emergency to the triager    Negative: Chest pain    Negative: Sinus pain and congestion    Negative: Headache or pain in upper forehead    Negative: Toothache is main symptom    Negative: Followed a face injury    Negative: Difficulty breathing or unusual sweating (e.g., sweating without exertion)    Negative: Can't close the mouth fully (i.e., \"mouth is locked open\", patient will have difficulty talking)    Negative: [1] Fever AND [2] localized red rash    Negative: [1] Fever AND [2] area of face is swollen    Negative: [1] New onset jaw pain AND [2] unknown cause AND [3] at least one cardiac risk factor (i.e., hypertension, diabetes, obesity, smoker or strong family history of heart disease)    Negative: Patient sounds very sick or weak to the triager    Negative: [1] SEVERE pain (e.g., excruciating) AND [2] not improved after 2 hours of pain medicine    Negative: [1] Localized red rash AND [2] painful to the touch    Negative: [1] Painful rash AND [2] multiple small blisters grouped together (i.e., dermatomal distribution or \"band\" or \"stripe\" on one side of face)    Negative: [1] Swollen area of face AND [2] toothache    Negative: [1] Swollen area of face AND [2] is painful to touch    Negative: Swelling around the eye    Protocols " used: FACE PAIN-A-AH

## 2021-10-12 ENCOUNTER — OFFICE VISIT (OUTPATIENT)
Dept: FAMILY MEDICINE | Facility: CLINIC | Age: 51
End: 2021-10-12
Payer: COMMERCIAL

## 2021-10-12 VITALS
DIASTOLIC BLOOD PRESSURE: 100 MMHG | WEIGHT: 196 LBS | BODY MASS INDEX: 27.44 KG/M2 | HEART RATE: 85 BPM | RESPIRATION RATE: 14 BRPM | HEIGHT: 71 IN | SYSTOLIC BLOOD PRESSURE: 143 MMHG | TEMPERATURE: 97.4 F | OXYGEN SATURATION: 100 %

## 2021-10-12 DIAGNOSIS — G50.0 TRIGEMINAL NEURALGIA: Primary | ICD-10-CM

## 2021-10-12 DIAGNOSIS — G43.709 CHRONIC MIGRAINE WITHOUT AURA WITHOUT STATUS MIGRAINOSUS, NOT INTRACTABLE: ICD-10-CM

## 2021-10-12 DIAGNOSIS — Z23 NEED FOR INFLUENZA VACCINATION: ICD-10-CM

## 2021-10-12 DIAGNOSIS — R68.84 JAW PAIN: ICD-10-CM

## 2021-10-12 DIAGNOSIS — Z23 NEED FOR PROPHYLACTIC VACCINATION AND INOCULATION AGAINST INFLUENZA: ICD-10-CM

## 2021-10-12 DIAGNOSIS — Z23 NEED FOR SHINGLES VACCINE: ICD-10-CM

## 2021-10-12 PROCEDURE — 90750 HZV VACC RECOMBINANT IM: CPT | Performed by: NURSE PRACTITIONER

## 2021-10-12 PROCEDURE — 90472 IMMUNIZATION ADMIN EACH ADD: CPT | Performed by: NURSE PRACTITIONER

## 2021-10-12 PROCEDURE — 90682 RIV4 VACC RECOMBINANT DNA IM: CPT | Performed by: NURSE PRACTITIONER

## 2021-10-12 PROCEDURE — 90471 IMMUNIZATION ADMIN: CPT | Performed by: NURSE PRACTITIONER

## 2021-10-12 PROCEDURE — 99214 OFFICE O/P EST MOD 30 MIN: CPT | Mod: 25 | Performed by: NURSE PRACTITIONER

## 2021-10-12 RX ORDER — CYCLOBENZAPRINE HCL 5 MG
5-10 TABLET ORAL 3 TIMES DAILY PRN
Qty: 60 TABLET | Refills: 1 | Status: SHIPPED | OUTPATIENT
Start: 2021-10-12 | End: 2021-12-09

## 2021-10-12 RX ORDER — DIAZEPAM 10 MG
TABLET ORAL
Qty: 1 TABLET | Refills: 0 | Status: SHIPPED | OUTPATIENT
Start: 2021-10-12 | End: 2021-12-23

## 2021-10-12 RX ORDER — HYDROCODONE BITARTRATE AND ACETAMINOPHEN 5; 325 MG/1; MG/1
1 TABLET ORAL EVERY 6 HOURS PRN
Qty: 28 TABLET | Refills: 0 | Status: SHIPPED | OUTPATIENT
Start: 2021-10-12 | End: 2022-02-11

## 2021-10-12 RX ORDER — OXCARBAZEPINE 300 MG/1
300 TABLET, FILM COATED ORAL 2 TIMES DAILY
Qty: 90 TABLET | Refills: 1 | Status: SHIPPED | OUTPATIENT
Start: 2021-10-12 | End: 2021-11-01

## 2021-10-12 ASSESSMENT — MIFFLIN-ST. JEOR: SCORE: 1761.18

## 2021-10-12 ASSESSMENT — PAIN SCALES - GENERAL: PAINLEVEL: WORST PAIN (10)

## 2021-10-12 NOTE — PATIENT INSTRUCTIONS
Patient Education     Trigeminal Neuralgia   You have trigeminal neuralgia. This is pain caused by irritation of the trigeminal nerve on your face. Symptoms include sudden, sharp pain in your head or face. It may feel like an electric shock. It can last for several seconds or minutes. It often happens on only 1 side of your face. Pain may be triggered by things such as moving your jaw when brushing your teeth or eating. Or by a touch on the skin of your face. The pain may be caused by something irritating the trigeminal nerve, such as a blood vessel pressing against it. But the exact cause of this problem often isn t known. It can be very painful. But the condition isn t dangerous.  Trigeminal neuralgia is often treated with medicines. These include anti-seizure medicines or antidepressants. Certain other treatments may also help. In some cases, you may need surgery. For some people, radiation therapy is needed with a precise tool called gamma knife. This does not make any cuts (incisions). Some people have underlying health problems causing trigeminal neuralgia, such as multiple sclerosis. You may need an imaging test such as a CT scan or an MRI of the brain and skull. You will be advised if other health problems need treatment.    Home care  Your healthcare provider may prescribe medicines to help ease and prevent pain. Take all medicines as directed. Please note that it may take several changes in dose and medicines before the right combination is found that controls the pain.  General care:    Plan to rest at home today.    Don't do any specific activities that seem to trigger the pain.    Over the next few weeks, keep a pain diary. Write down when your symptoms happen and how they feel. Certain activities such as touching your face, chewing, talking, or brushing your teeth may bring on the pain. Cold air can also trigger the pain. Make sure you write down any triggers and discuss these with your provider. This  will help guide treatment.  Follow-up care  Follow up with your healthcare provider, or as advised. If you were referred to a neurologist, be sure to make an appointment.  For more information on your condition, visit:    [employer]  www.fpa-support.org  When to get medical advice  Call your healthcare provider right away if any of these occur:    Fever of 100.4 F (38 C) or higher, or as advised by your provider    Headache with very stiff neck    You aren t able to keep liquids down (repeated vomiting)    Extreme drowsiness or confusion    Dizziness or fainting    A new feeling of weakness or numbness or tingling in your arm, leg, or face    Trouble speaking or seeing  [employer] last reviewed this educational content on 9/1/2019 2000-2021 [employer]. All rights reserved. This information is not intended as a substitute for professional medical care. Always follow your healthcare professional's instructions.

## 2021-10-12 NOTE — PROGRESS NOTES
Assessment & Plan     Trigeminal neuralgia  It is concerning for possible trigeminal neuralgia.  Given the presentation of the symptoms we will start treating symptomatically.  MRI and MRA of brain will be obtained today.  Due to history of claustrophobia Valium will be taken prior to procedure.  He knows to bring a ride with him for the procedure.  In addition Flexeril prescribed to see if this will help intermittently as well.  - OXcarbazepine (TRILEPTAL) 300 MG tablet; Take 1 tablet (300 mg) by mouth 2 times daily  - MR Brain w/o & w Contrast; Future  - MRA Brain ([facility]) wo & w Contrast; Future  - diazepam (VALIUM) 10 MG tablet; Take prior to MRI. Bring with to imaging appointment.  - HYDROcodone-acetaminophen (NORCO) 5-325 MG tablet; Take 1 tablet by mouth every 6 hours as needed for severe pain  - cyclobenzaprine (FLEXERIL) 5 MG tablet; Take 1-2 tablets (5-10 mg) by mouth 3 times daily as needed for muscle spasms    Jaw pain  Likely secondary to trigeminal neuralgia.  Dental causes have been ruled out.  - cyclobenzaprine (FLEXERIL) 5 MG tablet; Take 1-2 tablets (5-10 mg) by mouth 3 times daily as needed for muscle spasms    Chronic migraine without aura without status migrainosus, not intractable  History of migraine headaches.  Has limited supply of hydrocodone for severe migraines.  Has been taking this more often due to the jaw pain.  Refilled for patient today.  PDMP reviewed.  - HYDROcodone-acetaminophen (NORCO) 5-325 MG tablet; Take 1 tablet by mouth every 6 hours as needed for severe pain  - cyclobenzaprine (FLEXERIL) 5 MG tablet; Take 1-2 tablets (5-10 mg) by mouth 3 times daily as needed for muscle spasms    Need for influenza vaccination  - INFLUENZA VACCINE IM >6 MO VALENT IIV4 (ALFURIA/FLUZONE)    Need for shingles vaccine  - ZOSTER VACCINE RECOMBINANT ADJUVANTED IM NJX             Tobacco Cessation:   reports that he has been smoking cigarettes. He has a 7.50 pack-year smoking  "history. He has quit using smokeless tobacco.  His smokeless tobacco use included chew.      BMI:   Estimated body mass index is 27.34 kg/m  as calculated from the following:    Height as of this encounter: 1.803 m (5' 11\").    Weight as of this encounter: 88.9 kg (196 lb).           Return in about 4 weeks (around 11/9/2021) for or sooner if symptoms fail to improve.    Tamia Yeh, KATHERINE CNP  [facility]    Darin Marcum is a 51 year old who presents for the following health issues     HPI     Chief Complaint   Patient presents with     Jaw Pain     pt is having severe pain in right side of jaw is hardto talk without getting shooting pains very hard to chew anything has lost weight     Flu Shot     From 1/4/21 note and addended for further information:  Concern - jar pain R side   Onset: 5/2020  Description: Patient is having R side jaw pain, started with some dental work being done with a filling replacement. Since that time he had persistent throbbing in the lower right jaw. September- November he went to the dentist and imaging was done 3 times. 12/2020 Patient had a root canal and that still did not relieve the pain. it increases with cold and jaw movement decreases with pressure on the jaw. On 1/11/21 he saw an endodontist. Determined that this was not tooth related. 1/19/21 Crown was placed.     Since that time May 2021 mild intermittent throbbing noted, not sharp shooting electrocution type pain.     July 8: Sharp, shooting, pain that will last a couple seconds. Symptoms will last up to 90 sec but that is rare.    10/8: symptoms became more persistent and frequent.   Intensity: severe on and off   Progression of Symptoms:  same  Accompanying Signs & Symptoms: at the time of dental work was done   Previous history of similar problem: none   Precipitating factors:        Worsened by: cold talking and jaw movement   Alleviating factors:        Improved by: with pressure on " "the jaw   Therapies tried and outcome: Patient has been to the dentist and had a root canal done, that did not relieve the pain     He has been using a half of norco and half the relpax which he takes for his migraines. Migraine incident has been present every other day historically. He reports that he recently had reduced incidence of migraine until today.     He would also like immunizations today.     Review of Systems   Constitutional, HEENT, cardiovascular, pulmonary, gi and gu systems are negative, except as otherwise noted.      Objective    BP (!) 143/100 (BP Location: Right arm, Patient Position: Chair, Cuff Size: Adult Large)   Pulse 85   Temp 97.4  F (36.3  C) (Tympanic)   Resp 14   Ht 1.803 m (5' 11\")   Wt 88.9 kg (196 lb)   SpO2 100%   BMI 27.34 kg/m    Body mass index is 27.34 kg/m .     Physical Exam   GENERAL: healthy, alert and no distress  NECK: no adenopathy, no asymmetry, masses, or scars and thyroid normal to palpation  RESP: lungs clear to auscultation - no rales, rhonchi or wheezes  CV: regular rate and rhythm, normal S1 S2, no S3 or S4, no murmur, click or rub, no peripheral edema and peripheral pulses strong  ABDOMEN: soft, nontender, no hepatosplenomegaly, no masses and bowel sounds normal  MS: no gross musculoskeletal defects noted, no edema  NEURO: Normal strength and tone, mentation intact and speech normal  PSYCH: mentation appears normal, affect normal/bright    "

## 2021-10-15 ENCOUNTER — HOSPITAL ENCOUNTER (OUTPATIENT)
Dept: MRI IMAGING | Facility: CLINIC | Age: 51
End: 2021-10-15
Attending: NURSE PRACTITIONER
Payer: COMMERCIAL

## 2021-10-15 DIAGNOSIS — G50.0 TRIGEMINAL NEURALGIA: ICD-10-CM

## 2021-10-15 PROCEDURE — 255N000002 HC RX 255 OP 636: Performed by: NURSE PRACTITIONER

## 2021-10-15 PROCEDURE — 70553 MRI BRAIN STEM W/O & W/DYE: CPT

## 2021-10-15 PROCEDURE — 70544 MR ANGIOGRAPHY HEAD W/O DYE: CPT

## 2021-10-15 PROCEDURE — A9585 GADOBUTROL INJECTION: HCPCS | Performed by: NURSE PRACTITIONER

## 2021-10-15 RX ORDER — GADOBUTROL 604.72 MG/ML
8 INJECTION INTRAVENOUS ONCE
Status: COMPLETED | OUTPATIENT
Start: 2021-10-15 | End: 2021-10-15

## 2021-10-15 RX ADMIN — GADOBUTROL 8 ML: 604.72 INJECTION INTRAVENOUS at 17:49

## 2021-10-18 ENCOUNTER — MYC MEDICAL ADVICE (OUTPATIENT)
Dept: FAMILY MEDICINE | Facility: CLINIC | Age: 51
End: 2021-10-18

## 2021-10-18 DIAGNOSIS — G50.0 TRIGEMINAL NEURALGIA: ICD-10-CM

## 2021-10-18 NOTE — TELEPHONE ENCOUNTER
Msg below is an FYI:  Pt states the Trileptal is working good and takes Flexeril @ night and feels it helps too. [email or web address]

## 2021-11-01 RX ORDER — OXCARBAZEPINE 300 MG/1
TABLET, FILM COATED ORAL
Qty: 90 TABLET | Refills: 1 | Status: SHIPPED | OUTPATIENT
Start: 2021-11-01 | End: 2021-12-09

## 2021-11-01 NOTE — TELEPHONE ENCOUNTER
Please see mychart. Would you like to see patient in clinic? Routed to provider.  Esther Virk RN

## 2021-11-02 ENCOUNTER — MYC MEDICAL ADVICE (OUTPATIENT)
Dept: FAMILY MEDICINE | Facility: CLINIC | Age: 51
End: 2021-11-02

## 2021-11-02 DIAGNOSIS — G50.0 TRIGEMINAL NEURALGIA: Primary | ICD-10-CM

## 2021-11-29 ENCOUNTER — MYC MEDICAL ADVICE (OUTPATIENT)
Dept: FAMILY MEDICINE | Facility: CLINIC | Age: 51
End: 2021-11-29
Payer: COMMERCIAL

## 2021-11-29 DIAGNOSIS — G50.0 TRIGEMINAL NEURALGIA: Primary | ICD-10-CM

## 2021-11-29 NOTE — TELEPHONE ENCOUNTER
Can patient be seen in one of the virtual appointment spots? He does have a clinic visit on 12/9/21. Routed to provider.  Esther Virk RN

## 2021-11-30 RX ORDER — GABAPENTIN 300 MG/1
CAPSULE ORAL
Qty: 21 CAPSULE | Refills: 0 | Status: SHIPPED | OUTPATIENT
Start: 2021-11-30 | End: 2021-12-09

## 2021-12-09 ENCOUNTER — OFFICE VISIT (OUTPATIENT)
Dept: FAMILY MEDICINE | Facility: CLINIC | Age: 51
End: 2021-12-09
Payer: COMMERCIAL

## 2021-12-09 VITALS
BODY MASS INDEX: 28.98 KG/M2 | SYSTOLIC BLOOD PRESSURE: 148 MMHG | TEMPERATURE: 97.2 F | HEIGHT: 71 IN | DIASTOLIC BLOOD PRESSURE: 100 MMHG | WEIGHT: 207 LBS | OXYGEN SATURATION: 97 % | HEART RATE: 94 BPM | RESPIRATION RATE: 14 BRPM

## 2021-12-09 DIAGNOSIS — G50.0 TRIGEMINAL NEURALGIA: Primary | ICD-10-CM

## 2021-12-09 DIAGNOSIS — G43.709 CHRONIC MIGRAINE WITHOUT AURA WITHOUT STATUS MIGRAINOSUS, NOT INTRACTABLE: ICD-10-CM

## 2021-12-09 DIAGNOSIS — E78.5 HYPERLIPIDEMIA LDL GOAL <160: ICD-10-CM

## 2021-12-09 DIAGNOSIS — R68.84 JAW PAIN: ICD-10-CM

## 2021-12-09 DIAGNOSIS — I10 ESSENTIAL HYPERTENSION WITH GOAL BLOOD PRESSURE LESS THAN 140/90: ICD-10-CM

## 2021-12-09 LAB
ALBUMIN SERPL-MCNC: 3.7 G/DL (ref 3.4–5)
ALP SERPL-CCNC: 104 U/L (ref 40–150)
ALT SERPL W P-5'-P-CCNC: 41 U/L (ref 0–70)
ANION GAP SERPL CALCULATED.3IONS-SCNC: 3 MMOL/L (ref 3–14)
AST SERPL W P-5'-P-CCNC: 15 U/L (ref 0–45)
BASOPHILS # BLD AUTO: 0.1 10E3/UL (ref 0–0.2)
BASOPHILS NFR BLD AUTO: 1 %
BILIRUB SERPL-MCNC: 0.4 MG/DL (ref 0.2–1.3)
BUN SERPL-MCNC: 15 MG/DL (ref 7–30)
CALCIUM SERPL-MCNC: 9.1 MG/DL (ref 8.5–10.1)
CHLORIDE BLD-SCNC: 107 MMOL/L (ref 94–109)
CHOLEST SERPL-MCNC: 240 MG/DL
CO2 SERPL-SCNC: 28 MMOL/L (ref 20–32)
CREAT SERPL-MCNC: 1.07 MG/DL (ref 0.66–1.25)
CRP SERPL-MCNC: <2.9 MG/L (ref 0–8)
EOSINOPHIL # BLD AUTO: 0.5 10E3/UL (ref 0–0.7)
EOSINOPHIL NFR BLD AUTO: 6 %
ERYTHROCYTE [DISTWIDTH] IN BLOOD BY AUTOMATED COUNT: 13 % (ref 10–15)
ERYTHROCYTE [SEDIMENTATION RATE] IN BLOOD BY WESTERGREN METHOD: 4 MM/HR (ref 0–20)
FASTING STATUS PATIENT QL REPORTED: NO
GFR SERPL CREATININE-BSD FRML MDRD: 79 ML/MIN/1.73M2
GLUCOSE BLD-MCNC: 85 MG/DL (ref 70–99)
HCT VFR BLD AUTO: 46.5 % (ref 40–53)
HDLC SERPL-MCNC: 45 MG/DL
HGB BLD-MCNC: 16 G/DL (ref 13.3–17.7)
LDLC SERPL CALC-MCNC: 159 MG/DL
LYMPHOCYTES # BLD AUTO: 2.2 10E3/UL (ref 0.8–5.3)
LYMPHOCYTES NFR BLD AUTO: 27 %
MCH RBC QN AUTO: 32.6 PG (ref 26.5–33)
MCHC RBC AUTO-ENTMCNC: 34.4 G/DL (ref 31.5–36.5)
MCV RBC AUTO: 95 FL (ref 78–100)
MONOCYTES # BLD AUTO: 0.6 10E3/UL (ref 0–1.3)
MONOCYTES NFR BLD AUTO: 7 %
NEUTROPHILS # BLD AUTO: 5 10E3/UL (ref 1.6–8.3)
NEUTROPHILS NFR BLD AUTO: 60 %
NONHDLC SERPL-MCNC: 195 MG/DL
PLATELET # BLD AUTO: 247 10E3/UL (ref 150–450)
POTASSIUM BLD-SCNC: 4.6 MMOL/L (ref 3.4–5.3)
PROT SERPL-MCNC: 7.3 G/DL (ref 6.8–8.8)
RBC # BLD AUTO: 4.91 10E6/UL (ref 4.4–5.9)
SODIUM SERPL-SCNC: 138 MMOL/L (ref 133–144)
TRIGL SERPL-MCNC: 180 MG/DL
WBC # BLD AUTO: 8.4 10E3/UL (ref 4–11)

## 2021-12-09 PROCEDURE — 80053 COMPREHEN METABOLIC PANEL: CPT | Performed by: NURSE PRACTITIONER

## 2021-12-09 PROCEDURE — 99214 OFFICE O/P EST MOD 30 MIN: CPT | Performed by: NURSE PRACTITIONER

## 2021-12-09 PROCEDURE — 85652 RBC SED RATE AUTOMATED: CPT | Performed by: NURSE PRACTITIONER

## 2021-12-09 PROCEDURE — 36415 COLL VENOUS BLD VENIPUNCTURE: CPT | Performed by: NURSE PRACTITIONER

## 2021-12-09 PROCEDURE — 86140 C-REACTIVE PROTEIN: CPT | Performed by: NURSE PRACTITIONER

## 2021-12-09 PROCEDURE — 80061 LIPID PANEL: CPT | Performed by: NURSE PRACTITIONER

## 2021-12-09 PROCEDURE — 85025 COMPLETE CBC W/AUTO DIFF WBC: CPT | Performed by: NURSE PRACTITIONER

## 2021-12-09 RX ORDER — OXCARBAZEPINE 300 MG/1
TABLET, FILM COATED ORAL
Qty: 270 TABLET | Refills: 0 | Status: SHIPPED | OUTPATIENT
Start: 2021-12-09 | End: 2022-01-10

## 2021-12-09 RX ORDER — GABAPENTIN 300 MG/1
300 CAPSULE ORAL 3 TIMES DAILY
Qty: 270 CAPSULE | Refills: 0 | Status: SHIPPED | OUTPATIENT
Start: 2021-12-09 | End: 2021-12-23

## 2021-12-09 RX ORDER — SIMVASTATIN 40 MG
40 TABLET ORAL AT BEDTIME
Qty: 90 TABLET | Refills: 3 | Status: SHIPPED | OUTPATIENT
Start: 2021-12-09 | End: 2022-08-15

## 2021-12-09 RX ORDER — ELETRIPTAN HYDROBROMIDE 40 MG/1
TABLET, FILM COATED ORAL
Qty: 18 TABLET | Refills: 11 | Status: SHIPPED | OUTPATIENT
Start: 2021-12-09 | End: 2022-02-11

## 2021-12-09 RX ORDER — LOSARTAN POTASSIUM 100 MG/1
100 TABLET ORAL DAILY
Qty: 90 TABLET | Refills: 3 | Status: SHIPPED | OUTPATIENT
Start: 2021-12-09 | End: 2022-07-18

## 2021-12-09 RX ORDER — CYCLOBENZAPRINE HCL 5 MG
5 TABLET ORAL 3 TIMES DAILY PRN
Qty: 90 TABLET | Refills: 1 | Status: SHIPPED | OUTPATIENT
Start: 2021-12-09 | End: 2022-01-10

## 2021-12-09 ASSESSMENT — MIFFLIN-ST. JEOR: SCORE: 1811.08

## 2021-12-09 ASSESSMENT — PAIN SCALES - GENERAL: PAINLEVEL: MODERATE PAIN (4)

## 2021-12-09 NOTE — PROGRESS NOTES
Assessment & Plan     Trigeminal neuralgia  Presumed trigeminal neuralgia.  Gabapentin was started 2 weeks ago which has improved symptoms.  Advised patient that he can increase to 3 times daily.  For now continue on the oxcarbazepine.  If this does not seem to make a difference could consider weaning off.  He has a neurology appointment follow-up as scheduled with them no additional referrals at this time.  Labs will be completed today.  - gabapentin (NEURONTIN) 300 MG capsule; Take 1 capsule (300 mg) by mouth 3 times daily  - cyclobenzaprine (FLEXERIL) 5 MG tablet; Take 1 tablet (5 mg) by mouth 3 times daily as needed for muscle spasms  - OXcarbazepine (TRILEPTAL) 300 MG tablet; Take 2 tabs in the morning and 1 tab at night.  - CBC with platelets and differential; Future  - CRP, inflammation; Future  - ESR: Erythrocyte sedimentation rate; Future  - CBC with platelets and differential  - CRP, inflammation  - ESR: Erythrocyte sedimentation rate    Jaw pain  Suspect TN.  Refilled cyclobenzaprine as needed to take daily to twice daily.  - cyclobenzaprine (FLEXERIL) 5 MG tablet; Take 1 tablet (5 mg) by mouth 3 times daily as needed for muscle spasms    Chronic migraine without aura without status migrainosus, not intractable  Chronic migraines.  Cyclobenzaprine does help with this in addition refilled Relpax.  - cyclobenzaprine (FLEXERIL) 5 MG tablet; Take 1 tablet (5 mg) by mouth 3 times daily as needed for muscle spasms  - eletriptan (RELPAX) 40 MG tablet; Take 1 tablet by mouth. repeat after 2 hours if needed.    Essential hypertension with goal blood pressure less than 140/90  Blood pressure is elevated.  He does not take his losartan on a daily basis at times.  Encouraged him to be more diligent in taking his medication on a daily basis and to follow-up in 3 weeks to recheck blood pressure would consider additional medication to control blood pressure.  - Comprehensive metabolic panel (BMP + Alb, Alk Phos, ALT,  "AST, Total. Bili, TP); Future  - losartan (COZAAR) 100 MG tablet; Take 1 tablet (100 mg) by mouth daily  - Comprehensive metabolic panel (BMP + Alb, Alk Phos, ALT, AST, Total. Bili, TP)    Hyperlipidemia LDL goal <160  History of hyperlipidemia currently on simvastatin.  - Lipid panel reflex to direct LDL Non-fasting; Future  - simvastatin (ZOCOR) 40 MG tablet; Take 1 tablet (40 mg) by mouth At Bedtime  - Lipid panel reflex to direct LDL Non-fasting             Tobacco Cessation:   reports that he has been smoking cigarettes. He has a 7.50 pack-year smoking history. He has quit using smokeless tobacco.  His smokeless tobacco use included chew.      BMI:   Estimated body mass index is 28.87 kg/m  as calculated from the following:    Height as of this encounter: 1.803 m (5' 11\").    Weight as of this encounter: 93.9 kg (207 lb).           Return in about 3 weeks (around 12/30/2021) for follow up TN and BP.    KATHERINE Jaramillo [facility]   Elmo is a 51 year old who presents for the following health issues     HPI   Chief Complaint   Patient presents with     RECHECK      jaw pain on right side      Diagnosed with presumed TN on 10/12. Imaging head normal. Started oxcarbazepine with some improvement. Symptoms worsened mid November/ end of November. Started gabapentin 300mg daily then progressive increased to bid this week. This is reducing the episodes of pain from 10min-1 hour.   Symptoms have improved with the gabapentin and occurring now with eating/ drinking. It was more often prior to the gabapentin. Initial improvement was noted with the oxcabazepine.   Symptoms have improved since initial visit.  However continue to be bothersome and present.  Has neurology appointment on 2/3/21.     Patient would like a refill of his medications as well.  He does acknowledge that he does not take his losartan as scheduled on a daily basis because the size of the pill at times.  " "He does take his simvastatin as scheduled.  He would also like a refill of his Relpax and Flexeril.  Patient denies any chest pain, shortness of breath, or difficulty breathing.  No other medication side effects have been noted.    Review of Systems   Constitutional, HEENT, cardiovascular, pulmonary, gi and gu systems are negative, except as otherwise noted.      Objective    BP (!) 148/100   Pulse 94   Temp 97.2  F (36.2  C) (Tympanic)   Resp 14   Ht 1.803 m (5' 11\")   Wt 93.9 kg (207 lb)   SpO2 97%   BMI 28.87 kg/m    Body mass index is 28.87 kg/m .     Physical Exam   GENERAL: healthy, alert and no distress  NECK: no adenopathy, no asymmetry, masses, or scars and thyroid normal to palpation  MS: no gross musculoskeletal defects noted, no edema  NEURO: Normal strength and tone, mentation intact and speech normal  PSYCH: mentation appears normal, affect normal/bright                "

## 2021-12-10 ENCOUNTER — TELEPHONE (OUTPATIENT)
Dept: FAMILY MEDICINE | Facility: CLINIC | Age: 51
End: 2021-12-10
Payer: COMMERCIAL

## 2021-12-10 NOTE — TELEPHONE ENCOUNTER
Per fax received from [facility] - GABAPENTIN 300MG CAPSULES is not covered by patient's Insurance Company  Dr. BUI - Please choose:  1.  Change medication that is not covered to a different medication and send new prescription to patient's pharmacy?  2.  Patient will need to pay for the non-covered medication out-of-pocket?   3.  Try for Prior Authorization with Insurance Company to get medication covered?       KEY: [id number]   GERMAN  [id number]

## 2021-12-10 NOTE — TELEPHONE ENCOUNTER
Would you order a new prescription, send in for a prior auth or have patient pay out of pocket?  Routed to provider.  Esther Virk RN

## 2021-12-10 NOTE — TELEPHONE ENCOUNTER
Spoke with the pharmacist. Patient did  script today, but was only able to fill for 69 capsules. Insurance is only allowing a total of 90 in a one month period. Routed to provider to inform.  Esther Virk RN

## 2021-12-10 NOTE — TELEPHONE ENCOUNTER
"Can you clarify with pharmacy/ paitent, he has picked up gabapentin without a problem just a week ago. It just might be that he needs to wait till next month. I increased there dosage and it just might be an \"amount\" limit.   Tamia Yeh, APRN CNP    "

## 2021-12-23 ENCOUNTER — OFFICE VISIT (OUTPATIENT)
Dept: FAMILY MEDICINE | Facility: CLINIC | Age: 51
End: 2021-12-23
Payer: COMMERCIAL

## 2021-12-23 VITALS
HEART RATE: 106 BPM | DIASTOLIC BLOOD PRESSURE: 98 MMHG | OXYGEN SATURATION: 96 % | WEIGHT: 213 LBS | RESPIRATION RATE: 14 BRPM | SYSTOLIC BLOOD PRESSURE: 148 MMHG | TEMPERATURE: 98.3 F | BODY MASS INDEX: 29.82 KG/M2 | HEIGHT: 71 IN

## 2021-12-23 DIAGNOSIS — Z23 NEED FOR VACCINATION: ICD-10-CM

## 2021-12-23 DIAGNOSIS — G50.0 TRIGEMINAL NEURALGIA: Primary | ICD-10-CM

## 2021-12-23 DIAGNOSIS — R68.84 JAW PAIN: ICD-10-CM

## 2021-12-23 DIAGNOSIS — I10 ESSENTIAL HYPERTENSION WITH GOAL BLOOD PRESSURE LESS THAN 140/90: ICD-10-CM

## 2021-12-23 PROCEDURE — 99214 OFFICE O/P EST MOD 30 MIN: CPT | Mod: 25 | Performed by: NURSE PRACTITIONER

## 2021-12-23 PROCEDURE — 90750 HZV VACC RECOMBINANT IM: CPT | Performed by: NURSE PRACTITIONER

## 2021-12-23 PROCEDURE — 90471 IMMUNIZATION ADMIN: CPT | Performed by: NURSE PRACTITIONER

## 2021-12-23 RX ORDER — GABAPENTIN 600 MG/1
600 TABLET ORAL 3 TIMES DAILY
Qty: 90 TABLET | Refills: 0 | Status: SHIPPED | OUTPATIENT
Start: 2021-12-23 | End: 2022-03-14

## 2021-12-23 RX ORDER — HYDROCHLOROTHIAZIDE 25 MG/1
25 TABLET ORAL DAILY
Qty: 30 TABLET | Refills: 0 | Status: SHIPPED | OUTPATIENT
Start: 2021-12-23 | End: 2022-03-07

## 2021-12-23 ASSESSMENT — PAIN SCALES - GENERAL: PAINLEVEL: MODERATE PAIN (5)

## 2021-12-23 ASSESSMENT — MIFFLIN-ST. JEOR: SCORE: 1838.29

## 2021-12-23 NOTE — PROGRESS NOTES
"  Assessment & Plan     Trigeminal neuralgia  Symptoms continue to be significant and bothersome.  Discussed with patient increase gabapentin further by increasing to 300 mg every week.  Refilled gabapentin today.  Referral placed to pain management for symptomatic management until he can be seen by neurology.   - Pain Management Referral; Future  - gabapentin (NEURONTIN) 600 MG tablet; Take 1 tablet (600 mg) by mouth 3 times daily    Jaw pain  - Pain Management Referral; Future  - gabapentin (NEURONTIN) 600 MG tablet; Take 1 tablet (600 mg) by mouth 3 times daily    Essential hypertension with goal blood pressure less than 140/90  Blood pressure elevated likely related to pain.  Hydrochlorothiazide added.  - hydrochlorothiazide (HYDRODIURIL) 25 MG tablet; Take 1 tablet (25 mg) by mouth daily    Need for vaccination  - SHINGRIX [[id number]]             Tobacco Cessation:   reports that he has been smoking cigarettes. He has a 7.50 pack-year smoking history. He has quit using smokeless tobacco.  His smokeless tobacco use included chew.      BMI:   Estimated body mass index is 29.71 kg/m  as calculated from the following:    Height as of this encounter: 1.803 m (5' 11\").    Weight as of this encounter: 96.6 kg (213 lb).           Return in about 2 weeks (around 1/6/2022) for Follow up.    KATHERINE Jaramillo CNP  [facility]   Elmo is a 51 year old who presents for the following health issues     HPI     Chief Complaint   Patient presents with     RECHECK     from last visit gabapentin was rasied but pt states hasn't helped much   Has been taking gabapentin 300 mg 3 times daily along with Flexeril at night and as needed use of ibuprofen.  Pain gets triggered 3x per day. Small bites on the left side will help prevent triggering. He knows movements and what will trigger.  But at times will still occur randomly.  Symptoms are improved after gabapentin but don't last as long. Episode " "will last 30 sec to 10 min. Symptoms have in general reduced and not as long or as frequent. Pain is 3-4/10.     Flexeril doesn't seem to help, maybe?     Migraines are still separate but are not as frequent with the addition of gabapentin.     Dosing of gabapentin 300mg TID seems to help.     He is taking ibuprofen as needed. Norco taken to help reduce the pain signals.     Review of Systems   Constitutional, HEENT, cardiovascular, pulmonary, gi and gu systems are negative, except as otherwise noted.      Objective    BP (!) 148/98 (BP Location: Right arm, Patient Position: Chair, Cuff Size: Adult Regular)   Pulse 106   Temp 98.3  F (36.8  C) (Tympanic)   Resp 14   Ht 1.803 m (5' 11\")   Wt 96.6 kg (213 lb)   SpO2 96%   BMI 29.71 kg/m    Body mass index is 29.71 kg/m .  Physical Exam   GENERAL: healthy, alert and no distress  NECK: no adenopathy, no asymmetry, masses, or scars and thyroid normal to palpation  RESP: lungs clear to auscultation - no rales, rhonchi or wheezes  CV: regular rate and rhythm, normal S1 S2, no S3 or S4, no murmur, click or rub, no peripheral edema and peripheral pulses strong  ABDOMEN: soft, nontender, no hepatosplenomegaly, no masses and bowel sounds normal  MS: no gross musculoskeletal defects noted, no edema    MRI 10/15/2021  EXAM: MR BRAIN W/O and W CONTRAST  LOCATION: [facility]  DATE/TIME: 10/15/2021 5:10 PM     INDICATION: Headache, cluster/trigeminal.  COMPARISON: None.  CONTRAST: 8 mL Gadavist.  TECHNIQUE: Multiplanar multisequence head MRI without and with intravenous contrast including dedicated imaging of the skull base and trigeminal nerves.     FINDINGS:  Probable small old left cerebellar infarct. The cerebral hemispheres, brainstem, and cerebellum demonstrate normal morphology and signal. Cerebellar tonsils are slightly low-lying at the level of the foramen magnum, considered to be within normal limits.   Few scattered areas of white " matter T2 hyperintensities likely represent mild chronic small vessel ischemic change commensurate with age. The trigeminal nerves are unremarkable. Multiple small vascular structures are present coursing along the   trigeminal nerves, presumably incidental. No abnormal enhancement or diffusion restriction. Major intracranial flow voids are maintained.     Marrow signal is within normal limits. Mild to moderate paranasal sinus mucosal thickening. The visualized tympanic and mastoid cavities are unremarkable.                                                                      IMPRESSION:  1.  Probable small old left cerebellar infarct.  2.  Otherwise unremarkable MRI of the head.

## 2022-01-10 ENCOUNTER — OFFICE VISIT (OUTPATIENT)
Dept: FAMILY MEDICINE | Facility: CLINIC | Age: 52
End: 2022-01-10
Payer: COMMERCIAL

## 2022-01-10 VITALS
DIASTOLIC BLOOD PRESSURE: 62 MMHG | TEMPERATURE: 96.9 F | HEIGHT: 71 IN | OXYGEN SATURATION: 98 % | RESPIRATION RATE: 18 BRPM | BODY MASS INDEX: 28.03 KG/M2 | WEIGHT: 200.2 LBS | SYSTOLIC BLOOD PRESSURE: 96 MMHG | HEART RATE: 107 BPM

## 2022-01-10 DIAGNOSIS — Z23 HIGH PRIORITY FOR 2019-NCOV VACCINE: ICD-10-CM

## 2022-01-10 DIAGNOSIS — G50.0 TRIGEMINAL NEURALGIA: Primary | ICD-10-CM

## 2022-01-10 PROCEDURE — 0064A COVID-19,PF,MODERNA (18+ YRS BOOSTER .25ML): CPT | Performed by: NURSE PRACTITIONER

## 2022-01-10 PROCEDURE — 91306 COVID-19,PF,MODERNA (18+ YRS BOOSTER .25ML): CPT | Performed by: NURSE PRACTITIONER

## 2022-01-10 PROCEDURE — 99214 OFFICE O/P EST MOD 30 MIN: CPT | Performed by: NURSE PRACTITIONER

## 2022-01-10 RX ORDER — TIZANIDINE HYDROCHLORIDE 4 MG/1
4 CAPSULE, GELATIN COATED ORAL 3 TIMES DAILY
Qty: 60 CAPSULE | Refills: 0 | Status: SHIPPED | OUTPATIENT
Start: 2022-01-10 | End: 2022-02-11

## 2022-01-10 RX ORDER — OXCARBAZEPINE 300 MG/1
TABLET, FILM COATED ORAL
Qty: 270 TABLET | Refills: 0 | Status: SHIPPED | OUTPATIENT
Start: 2022-01-10 | End: 2022-03-14

## 2022-01-10 RX ORDER — OXCARBAZEPINE 300 MG/1
TABLET, FILM COATED ORAL
Qty: 270 TABLET | Refills: 0 | Status: SHIPPED | OUTPATIENT
Start: 2022-01-10 | End: 2022-01-10

## 2022-01-10 RX ORDER — TIZANIDINE HYDROCHLORIDE 4 MG/1
4 CAPSULE, GELATIN COATED ORAL 3 TIMES DAILY
Qty: 60 CAPSULE | Refills: 0 | Status: SHIPPED | OUTPATIENT
Start: 2022-01-10 | End: 2022-01-10

## 2022-01-10 ASSESSMENT — PATIENT HEALTH QUESTIONNAIRE - PHQ9
5. POOR APPETITE OR OVEREATING: MORE THAN HALF THE DAYS
SUM OF ALL RESPONSES TO PHQ QUESTIONS 1-9: 11

## 2022-01-10 ASSESSMENT — ANXIETY QUESTIONNAIRES
IF YOU CHECKED OFF ANY PROBLEMS ON THIS QUESTIONNAIRE, HOW DIFFICULT HAVE THESE PROBLEMS MADE IT FOR YOU TO DO YOUR WORK, TAKE CARE OF THINGS AT HOME, OR GET ALONG WITH OTHER PEOPLE: SOMEWHAT DIFFICULT
5. BEING SO RESTLESS THAT IT IS HARD TO SIT STILL: NOT AT ALL
7. FEELING AFRAID AS IF SOMETHING AWFUL MIGHT HAPPEN: SEVERAL DAYS
1. FEELING NERVOUS, ANXIOUS, OR ON EDGE: SEVERAL DAYS
2. NOT BEING ABLE TO STOP OR CONTROL WORRYING: SEVERAL DAYS
6. BECOMING EASILY ANNOYED OR IRRITABLE: SEVERAL DAYS
3. WORRYING TOO MUCH ABOUT DIFFERENT THINGS: SEVERAL DAYS
GAD7 TOTAL SCORE: 7

## 2022-01-10 ASSESSMENT — PAIN SCALES - GENERAL: PAINLEVEL: MODERATE PAIN (5)

## 2022-01-10 ASSESSMENT — MIFFLIN-ST. JEOR: SCORE: 1781.23

## 2022-01-10 NOTE — PROGRESS NOTES
"  Assessment & Plan     Trigeminal neuralgia  Discussed with patient that the weaning of his oxcarbazepine could be increasing the symptoms of his trigeminal neuralgia.  Recommend increasing the dosage of this again.  We will try tizanidine instead of Flexeril to see if this reduces generalized fatigue secondary to medication side effects.  Neurosurgery referral placed for patient today in addition to his other referrals he is already in place for management of his trigeminal neuralgia pain.  - OXcarbazepine (TRILEPTAL) 300 MG tablet; Take 2 tabs in the morning and 1 tab at night.  - tiZANidine (ZANAFLEX) 4 MG capsule; Take 1 capsule (4 mg) by mouth 3 times daily  - Neurosurgery Referral; Future    High priority for 2019-nCoV vaccine  - COVID-19,PF,MODERNA (18+ Yrs BOOSTER .25mL)             Tobacco Cessation:   reports that he has been smoking cigarettes. He has a 7.50 pack-year smoking history. He has quit using smokeless tobacco.  His smokeless tobacco use included chew.      BMI:   Estimated body mass index is 27.87 kg/m  as calculated from the following:    Height as of this encounter: 1.805 m (5' 11.06\").    Weight as of this encounter: 90.8 kg (200 lb 3.2 oz).       Depression Screening Follow Up    PHQ 1/10/2022   PHQ-9 Total Score 11   Q9: Thoughts of better off dead/self-harm past 2 weeks Not at all         Follow Up Actions Taken         Return in about 2 weeks (around 1/24/2022) for pain if needed. .    KATHERINE Jaramillo [facility]   Elmo is a 51 year old who presents for the following health issues     HPI     Pain History:  Trigeminal Neuralgia.   When did you first notice your pain? - May 2020 Got worse on Friday evening.   Have you seen any provider previously for this issue? Yes - 12/23/21  How has your pain affected your ability to work? Pain does not limit ability to work   What type of work do you or did you do?  at [employer]   Where " in your body do you have pain? Musculoskeletal problem/pain  Onset/Duration: Off and on Since May 2020. Increased November 2021. Worsened this last Friday/   Description  Location: right lower jaw -   Joint Swelling: no  Redness: no  Pain: YES  Warmth: no  Intensity:  severe  Progression of Symptoms:  worsening  Accompanying signs and symptoms:   Fevers: no  Numbness/tingling/weakness: YES- numbness on right side of tongue  History  Trauma to the area: started after a dental procedure  Recent illness:  no  Previous similar problem: no  Previous evaluation:  YES  Precipitating or alleviating factors:  Aggravating factors include: touch, shaving, eating, talking, brushing teeth  Therapies tried and outcome: prescription medications, ice    Gabapentin dose: he is now on 600mg tid since yesterday. He has been titrating up on the dose slowly.   He is weaning off of the oxcabazepine as this has not made a difference in the pain. He has been weaning down on this for a week. For the last week he was down to 600mg daily. Now down to 300mg daily.   The flexeril caused too much fatigue.   Norco didn't help with the pain. He took 1/2 tab and this didn't seem to make any difference.           How many servings of fruits and vegetables do you eat daily?  0-1    On average, how many sweetened beverages do you drink each day (Examples: soda, juice, sweet tea, etc.  Do NOT count diet or artificially sweetened beverages)?   4    How many days per week do you exercise enough to make your heart beat faster? 3 or less    How many minutes a day do you exercise enough to make your heart beat faster? 9 or less    How many days per week do you miss taking your medication? 0        Review of Systems   Constitutional, HEENT, cardiovascular, pulmonary, gi and gu systems are negative, except as otherwise noted.      Objective    BP 96/62 (BP Location: Right arm, Patient Position: Sitting, Cuff Size: Adult Large)   Pulse 107   Temp 96.9  F  "(36.1  C) (Tympanic)   Resp 18   Ht 1.805 m (5' 11.06\")   Wt 90.8 kg (200 lb 3.2 oz)   SpO2 98%   BMI 27.87 kg/m    Body mass index is 27.87 kg/m .     Physical Exam   GENERAL: healthy, alert and no distress  NECK: no adenopathy, no asymmetry, masses, or scars and thyroid normal to palpation  RESP: lungs clear to auscultation - no rales, rhonchi or wheezes  CV: regular rate and rhythm, normal S1 S2, no S3 or S4, no murmur, click or rub, no peripheral edema and peripheral pulses strong  MS: no gross musculoskeletal defects noted, no edema  NEURO: Normal strength and tone, mentation intact and speech normal  PSYCH: mentation appears normal, affect normal/bright                "

## 2022-01-11 ASSESSMENT — ANXIETY QUESTIONNAIRES: GAD7 TOTAL SCORE: 7

## 2022-01-12 ENCOUNTER — TELEPHONE (OUTPATIENT)
Dept: NEUROSURGERY | Facility: CLINIC | Age: 52
End: 2022-01-12
Payer: COMMERCIAL

## 2022-01-12 NOTE — TELEPHONE ENCOUNTER
[facility] Call Center    Phone Message    May a detailed message be left on voicemail: yes     Reason for Call: Appointment Intake    Referring Provider Name: Tamia Yeh APRN CNP in  FAMILY PRACTICE  Diagnosis and/or Symptoms: Trigeminal neuralgia    Action Taken: Message routed to:  [facility] neurosurgery     Travel Screening: Not Applicable

## 2022-01-17 NOTE — TELEPHONE ENCOUNTER
FUTURE VISIT INFORMATION      FUTURE VISIT INFORMATION:    Date: 1/25/2022    Time: 3pm    Location: [facility]  REFERRAL INFORMATION:    Referring provider:  Tamia Yeh    Referring providers clinic:  [facility]     Reason for visit/diagnosis  Trigeminal Neuralgia     RECORDS REQUESTED FROM:       Clinic name Comments Records Status Imaging Status   INternal DELPHINE Yeh-10/12/2021, 12/9/2021, 12/23/2021, 1/10/2022    MR Brain-10/15/2021    MRA Brain-10/15/2021 Epic PACS

## 2022-01-25 ENCOUNTER — VIRTUAL VISIT (OUTPATIENT)
Dept: NEUROSURGERY | Facility: CLINIC | Age: 52
End: 2022-01-25
Payer: COMMERCIAL

## 2022-01-25 ENCOUNTER — PRE VISIT (OUTPATIENT)
Dept: NEUROSURGERY | Facility: CLINIC | Age: 52
End: 2022-01-25
Payer: COMMERCIAL

## 2022-01-25 DIAGNOSIS — G50.0 TRIGEMINAL NEURALGIA: Primary | ICD-10-CM

## 2022-01-25 PROCEDURE — 99203 OFFICE O/P NEW LOW 30 MIN: CPT | Mod: 95 | Performed by: NURSE PRACTITIONER

## 2022-01-25 NOTE — LETTER
"2022       RE: Jossue Torres  [id number] [address] [address]     Dear Colleague,    Thank you for referring your patient, Jossue Torres, to the [facility] [place] [facility]. Please see a copy of my visit note below.    [facility]  Department of Neurosurgery      Name: Jossue Torres  MRN: [id number]  Age: 51 year old  : 1970  Referring provider: Tamia Yeh  2022      Chief Complaint:   Trigeminal Neuralgia    Goal of today's appointment:  \"Get better control of my TN pain\"    History of Present Illness:  Location of pain:  Which side of your face is painful? Right side- V3 distribution    Does the pain ever cross from one side to the other side of your face? No    Does the pain ever travel outside of your face (i.e. into your neck, behind your ear, or to the top of your head)? No    Autonomic symptoms (Facial flushing, eyelid drooping or swelling, eye redness, tearing, nasal stuffiness, sweating, and ear fullness): No    Description of Pain:  Episodic sharp, electrical pain    Triggers for your Pain:  Brushing, Eating, Talking    Is it constant or intermittent:  Intermittent    Which of the specialists have you already seen for your facial pain?   Dentist and endodontist. Pain started in 2020 after a dental filling treatment. Due to pain, filling was removed and he had a root canal treatment done.     When did your facial pain first begin?  2020    Current medications for Facial Pain:  Gabapentin 600 mg three times a day  Oxcarbazepine 300 mg three times a day  Tizanidine 4 mg tid    Previously tried medications for Facial pain/ side effects if any:  N/A    Imaging:  10/2021 MRI Brain:  IMPRESSION:  1.  Probable small old left cerebellar infarct.  2.  Otherwise unremarkable MRI of the head.       10/15/2021 MRA Brain:                                                     "                IMPRESSION:  1.  Unremarkable MRA of the head.         OTHER IMPORTANT MEDICAL INFORMATION:  Have you been diagnosed with multiple sclerosis?   No    Other medical co morbidities:  Migraine, On Relpax (Still has HA every other day)    Do you have a pacemaker or a history of heart conditions?   No    Do you take any prescription blood thinners? (examples: Coumadin/Warfarin, Eliquis, Plavix, Lovenox, Pradaxa, Xarelto)   No    Do you take aspirin (ASA, either 81mg or 325mg)?   No    Have you had any previous issues with anesthesia during prior surgeries?   N/A    Physical Exam: (limited due to video visit)  There were no vitals taken for this visit.   General: No acute distress.    Psych: Normal mood and affect. Behavior is normal.      Assessment:  Right V3 trigeminal neuralgia    Plan:  Discussed with Dr. Simeon. No compression on his recent MRI from 10/2021, but this was not done with FIESTA sequence.   Repeat MRI Brain with TN protocol (with FIESTA sequence) and patient to follow up with Dr. Simeon via virtual visit. I left a voice message for the patient with this plan.      Alyssa Haro, CNP  Department of Neurosurgery

## 2022-01-25 NOTE — PROGRESS NOTES
"Elmo is a 51 year old who is being evaluated via a billable video visit.      How would you like to obtain your AVS? MyChart  If the video visit is dropped, the invitation should be resent by: Send to e-mail at: [email or web address]  Will anyone else be joining your video visit? No      Video Start Time: 2.48  Video-Visit Details    Type of service:  Video Visit    Video End Time:3:08 PM    Originating Location (pt. Location): Home    Distant Location (provider location):  [facility]     Platform used for Video Visit: Kreix      [facility]  Department of Neurosurgery      Name: Jossue Torres  MRN: [id number]  Age: 51 year old  : 1970  Referring provider: Tamia Yeh  2022      Chief Complaint:   Trigeminal Neuralgia    Goal of today's appointment:  \"Get better control of my TN pain\"    History of Present Illness:  Location of pain:  Which side of your face is painful? Right side- V3 distribution    Does the pain ever cross from one side to the other side of your face? No    Does the pain ever travel outside of your face (i.e. into your neck, behind your ear, or to the top of your head)? No    Autonomic symptoms (Facial flushing, eyelid drooping or swelling, eye redness, tearing, nasal stuffiness, sweating, and ear fullness): No    Description of Pain:  Episodic sharp, electrical pain    Triggers for your Pain:  Brushing, Eating, Talking    Is it constant or intermittent:  Intermittent    Which of the specialists have you already seen for your facial pain?   Dentist and endodontist. Pain started in 2020 after a dental filling treatment. Due to pain, filling was removed and he had a root canal treatment done.     When did your facial pain first begin?  2020    Current medications for Facial Pain:  Gabapentin 600 mg three times a day  Oxcarbazepine 300 mg three times a day  Tizanidine 4 mg tid    Previously tried medications " for Facial pain/ side effects if any:  N/A    Imaging:  10/2021 MRI Brain:  IMPRESSION:  1.  Probable small old left cerebellar infarct.  2.  Otherwise unremarkable MRI of the head.       10/15/2021 MRA Brain:                                                                    IMPRESSION:  1.  Unremarkable MRA of the head.         OTHER IMPORTANT MEDICAL INFORMATION:  Have you been diagnosed with multiple sclerosis?   No    Other medical co morbidities:  Migraine, On Relpax (Still has HA every other day)    Do you have a pacemaker or a history of heart conditions?   No    Do you take any prescription blood thinners? (examples: Coumadin/Warfarin, Eliquis, Plavix, Lovenox, Pradaxa, Xarelto)   No    Do you take aspirin (ASA, either 81mg or 325mg)?   No    Have you had any previous issues with anesthesia during prior surgeries?   N/A    Physical Exam: (limited due to video visit)  There were no vitals taken for this visit.   General: No acute distress.    Psych: Normal mood and affect. Behavior is normal.        Assessment:  Right V3 trigeminal neuralgia    Plan:  Discussed with Dr. Simeon. No compression on his recent MRI from 10/2021, but this was not done with FIESTA sequence.   Repeat MRI Brain with TN protocol (with FIESTA sequence) and patient to follow up with Dr. Simeon via virtual visit. I left a voice message for the patient with this plan.      Alyssa Haro CNP  Department of Neurosurgery

## 2022-02-01 ENCOUNTER — MYC MEDICAL ADVICE (OUTPATIENT)
Dept: FAMILY MEDICINE | Facility: CLINIC | Age: 52
End: 2022-02-01
Payer: COMMERCIAL

## 2022-02-02 ENCOUNTER — HOSPITAL ENCOUNTER (OUTPATIENT)
Dept: MRI IMAGING | Facility: CLINIC | Age: 52
Discharge: HOME | End: 2022-02-02
Attending: NURSE PRACTITIONER | Admitting: NURSE PRACTITIONER
Payer: COMMERCIAL

## 2022-02-02 DIAGNOSIS — G50.0 TRIGEMINAL NEURALGIA: ICD-10-CM

## 2022-02-02 PROCEDURE — A9585 GADOBUTROL INJECTION: HCPCS | Performed by: NURSE PRACTITIONER

## 2022-02-02 PROCEDURE — 70553 MRI BRAIN STEM W/O & W/DYE: CPT

## 2022-02-02 PROCEDURE — 255N000002 HC RX 255 OP 636: Performed by: NURSE PRACTITIONER

## 2022-02-02 RX ORDER — GADOBUTROL 604.72 MG/ML
9 INJECTION INTRAVENOUS ONCE
Status: COMPLETED | OUTPATIENT
Start: 2022-02-02 | End: 2022-02-02

## 2022-02-02 RX ADMIN — GADOBUTROL 9 ML: 604.72 INJECTION INTRAVENOUS at 16:40

## 2022-02-03 ENCOUNTER — OFFICE VISIT (OUTPATIENT)
Dept: NEUROLOGY | Facility: CLINIC | Age: 52
End: 2022-02-03
Attending: NURSE PRACTITIONER
Payer: COMMERCIAL

## 2022-02-03 ENCOUNTER — TELEPHONE (OUTPATIENT)
Dept: FAMILY MEDICINE | Facility: CLINIC | Age: 52
End: 2022-02-03
Payer: COMMERCIAL

## 2022-02-03 ENCOUNTER — TELEPHONE (OUTPATIENT)
Dept: NEUROSURGERY | Facility: CLINIC | Age: 52
End: 2022-02-03

## 2022-02-03 VITALS
WEIGHT: 211 LBS | SYSTOLIC BLOOD PRESSURE: 158 MMHG | OXYGEN SATURATION: 99 % | BODY MASS INDEX: 29.54 KG/M2 | HEIGHT: 71 IN | DIASTOLIC BLOOD PRESSURE: 98 MMHG | HEART RATE: 70 BPM

## 2022-02-03 DIAGNOSIS — Z86.73 HISTORY OF STROKE: ICD-10-CM

## 2022-02-03 DIAGNOSIS — G50.0 TRIGEMINAL NEURALGIA: Primary | ICD-10-CM

## 2022-02-03 DIAGNOSIS — G43.001 MIGRAINE WITHOUT AURA AND WITH STATUS MIGRAINOSUS, NOT INTRACTABLE: ICD-10-CM

## 2022-02-03 PROCEDURE — 99205 OFFICE O/P NEW HI 60 MIN: CPT | Performed by: PSYCHIATRY & NEUROLOGY

## 2022-02-03 ASSESSMENT — MIFFLIN-ST. JEOR: SCORE: 1829.22

## 2022-02-03 NOTE — TELEPHONE ENCOUNTER
Medication has been previously prescribed for migraines and covered. PA requested.   KATHERINE Jaramillo CNP

## 2022-02-03 NOTE — TELEPHONE ENCOUNTER
Per fax received from [facility] - Eletriptan is not covered by patient's Insurance Company  Tamia - Please choose:  1.  Change medication that is not covered to a different medication and send new prescription to patient's pharmacy?  2.  Patient will need to pay for the non-covered medication out-of-pocket?   3.  Try for Prior Authorization with Insurance Company to get medication covered?        P.A. Phone #:       P.A.  ID#:

## 2022-02-03 NOTE — PROGRESS NOTES
"Jossue Torres is a 51 year old male who presents for:  Chief Complaint   Patient presents with     Consult     facial pain and headaches         Initial Vitals:  BP (!) 152/97 (BP Location: Right arm, Patient Position: Sitting, Cuff Size: Adult Regular)   Pulse 73   Ht 1.803 m (5' 11\")   Wt 95.7 kg (211 lb)   SpO2 100%   BMI 29.43 kg/m   Estimated body mass index is 29.43 kg/m  as calculated from the following:    Height as of this encounter: 1.803 m (5' 11\").    Weight as of this encounter: 95.7 kg (211 lb).. Body surface area is 2.19 meters squared. BP completed using cuff size: regular    Nursing Comments:     Nicole Han  "

## 2022-02-03 NOTE — PATIENT INSTRUCTIONS
AFTER VISIT SUMMARY (AVS):    At today's visit we thoroughly discussed current symptoms, MRI results (no trigeminal nerve impingement, old cerebellar stroke), available treatment options, and the plan.    Regarding your right trigeminal neuralgia, I would advise you to go back to the dentist and check right lower jaw for any possibility of abscess/infection or other dental issues since you have symptoms significantly worsened in June 2021.  Meanwhile, I would suggest lowering the middle dose of gabapentin to see if it helps your lightheadedness/dizziness.  Take 600 mg in the morning and evening and 300 mg midday while continuing oxcarbazepine at 300 mg 3 times daily without changes.  If pain worsens, we might increase the dose of oxcarbazepine.  Please let me know.    Regarding your migraine headaches, we discussed that gabapentin might be helpful for migraine prevention.  Additional treatment options include Effexor, Nortriptyline, Amitriptyline, doxepin, Topamax, Depakote, propranolol, verapamil, CGRP antagonists, and neurostimulator devices (Cefaly or Nerivio).  For acute headache therapy, continue Relpax at the onset of intolerable headache, but limit use to less than 9 days/month.    Reviewed non-pharmacological headache prevention measures include proper sleep hygiene, regular meals, adequate hydration, regular aerobic exercise, stress reduction techniques, limiting caffeine intake, and quit smoking.    Please keep the headache diary (paper or Migraine Sahil) and bring it to the next follow up visit or upload it via My Chart.    Regarding your incidentally found old stroke in the cerebellum, I would suggest you to start 81 mg of daily aspirin and discuss with your primary care provider adjustment of the dose of statin to keep your LDL in 40-70 range.     Next follow-up appointment is in the next 2 months or earlier if needed.    Please do not hesitate to call me with any questions or concerns.    Thanks.

## 2022-02-03 NOTE — PROGRESS NOTES
INITIAL NEUROLOGY CONSULTATION    DATE OF VISIT: 2/3/2022  CLINIC LOCATION: [facility]  MRN: [id number]  PATIENT NAME: Jossue Torres  YOB: 1970    REASON FOR VISIT:   Chief Complaint   Patient presents with     Consult     facial pain and headaches      HISTORY OF PRESENT ILLNESS:                                                    Mr. Jossue Torres is 51 year old right handed male patient with past medical history of migraine, hypertension, herpes zoster and anxiety, who was seen today for facial pain/trigeminal neuralgia.  He would like to also discuss his persistent migraines and incidentally found stroke.    Per patient's report, he developed notable dull discomfort/pain in the right lower jaw area in May 2020 after his dentist replaced the filling of the right lower molar.  Pain did not improve, and eventually root canal/crown was done without success.  After that, he was seen by endodontist in January 2021, but no dental causes of his persistent pain were found.  Pain eventually subsided.    Then, in June 2021 he developed different and very severe pain.  He describes short bursts of electric/shooting pain on the right side of the face/lower jaw while eating, drinking, talking, blowing his nose, brushing his teeth, and shaving.  It feels like shooting pain near the right lower gumline that occasionally causes right side of the tongue and face to go numb.  It occurs randomly multiple times per day.      Initially the patient was started on oxcarbazepine, and later gabapentin was added.  Currently he is on oxcarbazepine 300 mg 3 times daily, gabapentin 600 mg 3 times per day, and tizanidine 4 mg 3 times daily.  He reports occasional lightheadedness after gabapentin dose was increased from 300 mg 3 times per day to 600 mg 3 times per day.  No other significant side effects.  He feels that his pain is much better controlled.  The last episode of shooting pain was approximately 1.5 to  2 weeks ago, but he still has some mild discomfort in the right lower jaw.    Recently evaluated at the [facility] neurosurgery clinic.  Additional MRI including FIESTA sequence was ordered and came back negative on 2/2/2022.  He will see Dr. Simeon afterwards.    In addition, he has history of migraines since 14 years of age, but denies any additional focal neurological symptoms.  Right occipital throbbing intense headaches used to occur up to 15 days/month and are associated with light/sound sensitivity, nausea and vomiting, and intolerance of physical activity.  Relpax is helpful for acute therapy along with occasional Norco when Relpax does not work.  No additional preventive therapy, though he states that he was on several medications before without success.  At the same time, he feels that after adding gabapentin his headaches notably reduced in frequency.  He wants to discuss additional treatment options.    Laboratory evaluation from December 2021 includes unremarkable CMP, elevated LDL (159), normal CBC, CRP, and sed rate.    Brain MRI with and without contrast from 10/15/2021 demonstrated probable small old left cerebellar infarct without other abnormal findings.  Brain MRA from the same day was normal.  Images were personally reviewed and independently interpreted.    No additional useful information is available in Care Everywhere, which was reviewed.  PAST MEDICAL/SURGICAL HISTORY:                                                    I personally reviewed patient's past medical and surgical history with the patient at today's visit.  MEDICATIONS:                                                    I personally reviewed patient's medications and allergies with the patient at today's visit.  eletriptan (RELPAX) 40 MG tablet, Take 1 tablet by mouth. repeat after 2 hours if needed.  gabapentin (NEURONTIN) 600 MG tablet, Take 1 tablet (600 mg) by mouth 3 times daily  hydrochlorothiazide  "(HYDRODIURIL) 25 MG tablet, Take 1 tablet (25 mg) by mouth daily  HYDROcodone-acetaminophen (NORCO) 5-325 MG tablet, Take 1 tablet by mouth every 6 hours as needed for severe pain  ibuprofen 200 MG capsule, Take 200 mg by mouth every 4 hours as needed for fever   losartan (COZAAR) 100 MG tablet, Take 1 tablet (100 mg) by mouth daily  OXcarbazepine (TRILEPTAL) 300 MG tablet, Take 2 tabs in the morning and 1 tab at night.  simvastatin (ZOCOR) 40 MG tablet, Take 1 tablet (40 mg) by mouth At Bedtime  tiZANidine (ZANAFLEX) 4 MG capsule, Take 1 capsule (4 mg) by mouth 3 times daily  TYLENOL 325 MG OR TABS,     No current facility-administered medications on file prior to visit.    ALLERGIES:                                                      Allergies   Allergen Reactions     Nkda [No Known Drug Allergies]      EXAM:                                                    VITAL SIGNS:   BP (!) 158/98 (BP Location: Right arm, Patient Position: Sitting, Cuff Size: Adult Regular)   Pulse 70   Ht 1.803 m (5' 11\")   Wt 95.7 kg (211 lb)   SpO2 99%   BMI 29.43 kg/m    Mini-Cog Assessment:  Mini Cog Assessment  Clock Draw Score: 2 Normal  3 Item Recall: 3 objects recalled  Mini Cog Total Score: 5  Administered by: : Nicole VILLALOBOS    General: pt is in NAD, cooperative.  Skin: normal turgor, moist mucous membranes, no lesions/rashes noticed.  HEENT: ATNC, EOMI, PERRL, white sclera, normal conjunctiva, no nystagmus or ptosis. No carotid bruits bilaterally.  Respiratory: lung sounds clear to auscultation bilaterally, no crackles, wheezes, rhonchi. Symmetric lung excursion, no accessory respiratory muscle use.  Cardiovascular: normal S1/S2, no murmurs/rubs/gallops.   Abdomen: Not distended.  : deferred.    Neurological:  Mental: alert, follows commands, Mini Cog Total Score: 5/5 with 3/3 on memory recall, no aphasia or dysarthria. Fund of knowledge is appropriate for age.  Cranial Nerves:  CN II: visual acuity - able to accurately count " fingers with each eye. Visual fields intact, fundi: discs sharp, no papilledema and normal vessels bilaterally.  CN III, IV, VI: EOM intact, pupils equal and reactive  CN V: facial sensation nl  CN VII: face symmetric, no facial droop  CN VIII: hearing normal  CN IX: palate elevation symmetric, uvula at midline  CN XI SCM normal, shoulder shrug nl  CN XII: tongue midline  Motor: Strength: 5/5 in all major groups of all extremities. Normal tone.  Intermittent mild bilateral postural hand tremor.  No other abnormal movements. No pronator drift b/l.  Reflexes: Triceps, biceps, brachioradialis, patellar, and achilles reflexes normal and symmetric. No clonus noted. Toes are down-going b/l.   Sensory: light touch, pinprick, and vibration intact. Romberg: negative.  Coordination: FNF and heel-shin tests intact b/l.   Gait:  Normal, able to tandem walk without difficulty.  DATA:   LABS/EEG/IMAGING/OTHER STUDIES: I reviewed pertinent medical records, as detailed in the history of present illness.  ASSESSMENT AND PLAN:      ASSESSMENT: Jossue Torres is a 51 year old male patient with listed above past medical history, who presents with right trigeminal neuralgia in V3 distribution.  He also wants to discuss his migraine headaches and stroke that was incidentally found on MRI.    We had a detailed discussion with the patient regarding his presenting complaints.  The neurological exam today is non-focal.  Recent brain MRI and head MRA were reviewed with the patient.  No intracranial causes of trigeminal neuralgia were found.    We discussed trigeminal neuralgia, migraines, and secondary stroke prevention measures.  The recommendations and the plan are summarized below.    Elmo to follow up with Primary Care provider regarding elevated blood pressure.     DIAGNOSES:    ICD-10-CM    1. Trigeminal neuralgia  G50.0 Adult Neurology Referral   2. Migraine without aura and with status migrainosus, not intractable  G43.001    3. History  of stroke  Z86.73      PLAN: At today's visit we thoroughly discussed current symptoms, MRI results (no trigeminal nerve impingement, old cerebellar stroke), available treatment options, and the plan.    Regarding his right trigeminal neuralgia, I recommended the patient to go back to the dentist and check right lower jaw for any possibility of abscess/infection or other dental issues since shooting lower jaw pain started in June 2021.  Meanwhile, I would suggest lowering the middle dose of gabapentin to see if it helps his lightheadedness/dizziness.  I told him to take 600 mg in the morning and evening and 300 mg midday while continuing oxcarbazepine at 300 mg 3 times daily without changes.  If pain worsens, we might increase the dose of oxcarbazepine. He will let me know in such case.    Regarding migraine headaches, we discussed that gabapentin might be helpful for migraine prevention.  Additional treatment options include Effexor, Nortriptyline, Amitriptyline, doxepin, Topamax, Depakote, propranolol, verapamil, CGRP antagonists, and neurostimulator devices (Cefaly or Nerivio).  The patient will find out what medications he already took in the past and let me know at the next visit.  We discussed that if several oral medications were already tried, we might consider CGRP antagonist.  For acute headache therapy, he will continue Relpax at the onset of intolerable headache, but I told him to limit use to less than 9 days/month.    Reviewed non-pharmacological headache prevention measures include proper sleep hygiene, regular meals, adequate hydration, regular aerobic exercise, stress reduction techniques, limiting caffeine intake, and quit smoking.    I advised the patient to keep the headache diary (paper or Migraine Sahil) and bring it to the next follow up visit or upload it via My Chart.    Regarding his incidentally found old stroke in the cerebellum, I recommend to start 81 mg of daily aspirin and discuss  with his primary care provider adjustment of the dose of statin to keep his LDL in 40-70 range.     Next follow-up appointment is in the next 2 months or earlier if needed.    Total Time: 65 minutes spent on the date of the encounter doing chart review, history and exam, documentation and further activities per the note.    Robert Cooper MD  [facility]  (Chart documentation was completed in part with Dragon voice-recognition software. Even though reviewed, some grammatical, spelling, and word errors may remain.)

## 2022-02-03 NOTE — LETTER
2/3/2022         RE: Jossue Torres  [address] [id number]        Dear Colleague,    Thank you for referring your patient, Jossue Torres, to the [facility]. Please see a copy of my visit note below.    INITIAL NEUROLOGY CONSULTATION    DATE OF VISIT: 2/3/2022  CLINIC LOCATION: [facility]  MRN: [id number]  PATIENT NAME: Jossue Torres  YOB: 1970    REASON FOR VISIT:   Chief Complaint   Patient presents with     Consult     facial pain and headaches      HISTORY OF PRESENT ILLNESS:                                                    Mr. Jossue Torres is 51 year old right handed male patient with past medical history of migraine, hypertension, herpes zoster and anxiety, who was seen today for facial pain/trigeminal neuralgia.  He would like to also discuss his persistent migraines and incidentally found stroke.    Per patient's report, he developed notable dull discomfort/pain in the right lower jaw area in May 2020 after his dentist replaced the filling of the right lower molar.  Pain did not improve, and eventually root canal/crown was done without success.  After that, he was seen by endodontist in January 2021, but no dental causes of his persistent pain were found.  Pain eventually subsided.    Then, in June 2021 he developed different and very severe pain.  He describes short bursts of electric/shooting pain on the right side of the face/lower jaw while eating, drinking, talking, blowing his nose, brushing his teeth, and shaving.  It feels like shooting pain near the right lower gumline that occasionally causes right side of the tongue and face to go numb.  It occurs randomly multiple times per day.      Initially the patient was started on oxcarbazepine, and later gabapentin was added.  Currently he is on oxcarbazepine 300 mg 3 times daily, gabapentin 600 mg 3 times per day, and tizanidine 4 mg 3 times daily.  He reports  occasional lightheadedness after gabapentin dose was increased from 300 mg 3 times per day to 600 mg 3 times per day.  No other significant side effects.  He feels that his pain is much better controlled.  The last episode of shooting pain was approximately 1.5 to 2 weeks ago, but he still has some mild discomfort in the right lower jaw.    Recently evaluated at the [facility] neurosurgery clinic.  Additional MRI including FIESTA sequence was ordered and came back negative on 2/2/2022.  He will see Dr. Simeon afterwards.    In addition, he has history of migraines since 14 years of age, but denies any additional focal neurological symptoms.  Right occipital throbbing intense headaches used to occur up to 15 days/month and are associated with light/sound sensitivity, nausea and vomiting, and intolerance of physical activity.  Relpax is helpful for acute therapy along with occasional Norco when Relpax does not work.  No additional preventive therapy, though he states that he was on several medications before without success.  At the same time, he feels that after adding gabapentin his headaches notably reduced in frequency.  He wants to discuss additional treatment options.    Laboratory evaluation from December 2021 includes unremarkable CMP, elevated LDL (159), normal CBC, CRP, and sed rate.    Brain MRI with and without contrast from 10/15/2021 demonstrated probable small old left cerebellar infarct without other abnormal findings.  Brain MRA from the same day was normal.  Images were personally reviewed and independently interpreted.    No additional useful information is available in Care Everywhere, which was reviewed.  PAST MEDICAL/SURGICAL HISTORY:                                                    I personally reviewed patient's past medical and surgical history with the patient at today's visit.  MEDICATIONS:                                                    I personally reviewed patient's  "medications and allergies with the patient at today's visit.  eletriptan (RELPAX) 40 MG tablet, Take 1 tablet by mouth. repeat after 2 hours if needed.  gabapentin (NEURONTIN) 600 MG tablet, Take 1 tablet (600 mg) by mouth 3 times daily  hydrochlorothiazide (HYDRODIURIL) 25 MG tablet, Take 1 tablet (25 mg) by mouth daily  HYDROcodone-acetaminophen (NORCO) 5-325 MG tablet, Take 1 tablet by mouth every 6 hours as needed for severe pain  ibuprofen 200 MG capsule, Take 200 mg by mouth every 4 hours as needed for fever   losartan (COZAAR) 100 MG tablet, Take 1 tablet (100 mg) by mouth daily  OXcarbazepine (TRILEPTAL) 300 MG tablet, Take 2 tabs in the morning and 1 tab at night.  simvastatin (ZOCOR) 40 MG tablet, Take 1 tablet (40 mg) by mouth At Bedtime  tiZANidine (ZANAFLEX) 4 MG capsule, Take 1 capsule (4 mg) by mouth 3 times daily  TYLENOL 325 MG OR TABS,     No current facility-administered medications on file prior to visit.    ALLERGIES:                                                      Allergies   Allergen Reactions     Nkda [No Known Drug Allergies]      EXAM:                                                    VITAL SIGNS:   BP (!) 158/98 (BP Location: Right arm, Patient Position: Sitting, Cuff Size: Adult Regular)   Pulse 70   Ht 1.803 m (5' 11\")   Wt 95.7 kg (211 lb)   SpO2 99%   BMI 29.43 kg/m    Mini-Cog Assessment:  Mini Cog Assessment  Clock Draw Score: 2 Normal  3 Item Recall: 3 objects recalled  Mini Cog Total Score: 5  Administered by: : Nicole VILLALOBOS    General: pt is in NAD, cooperative.  Skin: normal turgor, moist mucous membranes, no lesions/rashes noticed.  HEENT: ATNC, EOMI, PERRL, white sclera, normal conjunctiva, no nystagmus or ptosis. No carotid bruits bilaterally.  Respiratory: lung sounds clear to auscultation bilaterally, no crackles, wheezes, rhonchi. Symmetric lung excursion, no accessory respiratory muscle use.  Cardiovascular: normal S1/S2, no murmurs/rubs/gallops.   Abdomen: Not " distended.  : deferred.    Neurological:  Mental: alert, follows commands, Mini Cog Total Score: 5/5 with 3/3 on memory recall, no aphasia or dysarthria. Fund of knowledge is appropriate for age.  Cranial Nerves:  CN II: visual acuity - able to accurately count fingers with each eye. Visual fields intact, fundi: discs sharp, no papilledema and normal vessels bilaterally.  CN III, IV, VI: EOM intact, pupils equal and reactive  CN V: facial sensation nl  CN VII: face symmetric, no facial droop  CN VIII: hearing normal  CN IX: palate elevation symmetric, uvula at midline  CN XI SCM normal, shoulder shrug nl  CN XII: tongue midline  Motor: Strength: 5/5 in all major groups of all extremities. Normal tone.  Intermittent mild bilateral postural hand tremor.  No other abnormal movements. No pronator drift b/l.  Reflexes: Triceps, biceps, brachioradialis, patellar, and achilles reflexes normal and symmetric. No clonus noted. Toes are down-going b/l.   Sensory: light touch, pinprick, and vibration intact. Romberg: negative.  Coordination: FNF and heel-shin tests intact b/l.   Gait:  Normal, able to tandem walk without difficulty.  DATA:   LABS/EEG/IMAGING/OTHER STUDIES: I reviewed pertinent medical records, as detailed in the history of present illness.  ASSESSMENT AND PLAN:      ASSESSMENT: Jossue Torres is a 51 year old male patient with listed above past medical history, who presents with right trigeminal neuralgia in V3 distribution.  He also wants to discuss his migraine headaches and stroke that was incidentally found on MRI.    We had a detailed discussion with the patient regarding his presenting complaints.  The neurological exam today is non-focal.  Recent brain MRI and head MRA were reviewed with the patient.  No intracranial causes of trigeminal neuralgia were found.    We discussed trigeminal neuralgia, migraines, and secondary stroke prevention measures.  The recommendations and the plan are summarized  below.    Elmo to follow up with Primary Care provider regarding elevated blood pressure.     DIAGNOSES:    ICD-10-CM    1. Trigeminal neuralgia  G50.0 Adult Neurology Referral   2. Migraine without aura and with status migrainosus, not intractable  G43.001    3. History of stroke  Z86.73      PLAN: At today's visit we thoroughly discussed current symptoms, MRI results (no trigeminal nerve impingement, old cerebellar stroke), available treatment options, and the plan.    Regarding his right trigeminal neuralgia, I recommended the patient to go back to the dentist and check right lower jaw for any possibility of abscess/infection or other dental issues since shooting lower jaw pain started in June 2021.  Meanwhile, I would suggest lowering the middle dose of gabapentin to see if it helps his lightheadedness/dizziness.  I told him to take 600 mg in the morning and evening and 300 mg midday while continuing oxcarbazepine at 300 mg 3 times daily without changes.  If pain worsens, we might increase the dose of oxcarbazepine. He will let me know in such case.    Regarding migraine headaches, we discussed that gabapentin might be helpful for migraine prevention.  Additional treatment options include Effexor, Nortriptyline, Amitriptyline, doxepin, Topamax, Depakote, propranolol, verapamil, CGRP antagonists, and neurostimulator devices (Cefaly or Nerivio).  The patient will find out what medications he already took in the past and let me know at the next visit.  We discussed that if several oral medications were already tried, we might consider CGRP antagonist.  For acute headache therapy, he will continue Relpax at the onset of intolerable headache, but I told him to limit use to less than 9 days/month.    Reviewed non-pharmacological headache prevention measures include proper sleep hygiene, regular meals, adequate hydration, regular aerobic exercise, stress reduction techniques, limiting caffeine intake, and quit  "smoking.    I advised the patient to keep the headache diary (paper or Migraine Sahil) and bring it to the next follow up visit or upload it via My Chart.    Regarding his incidentally found old stroke in the cerebellum, I recommend to start 81 mg of daily aspirin and discuss with his primary care provider adjustment of the dose of statin to keep his LDL in 40-70 range.     Next follow-up appointment is in the next 2 months or earlier if needed.    Total Time: 65 minutes spent on the date of the encounter doing chart review, history and exam, documentation and further activities per the note.    Robert Cooper MD  [facility]  (Chart documentation was completed in part with Dragon voice-recognition software. Even though reviewed, some grammatical, spelling, and word errors may remain.)            Jossue Torres is a 51 year old male who presents for:  Chief Complaint   Patient presents with     Consult     facial pain and headaches         Initial Vitals:  BP (!) 152/97 (BP Location: Right arm, Patient Position: Sitting, Cuff Size: Adult Regular)   Pulse 73   Ht 1.803 m (5' 11\")   Wt 95.7 kg (211 lb)   SpO2 100%   BMI 29.43 kg/m   Estimated body mass index is 29.43 kg/m  as calculated from the following:    Height as of this encounter: 1.803 m (5' 11\").    Weight as of this encounter: 95.7 kg (211 lb).. Body surface area is 2.19 meters squared. BP completed using cuff size: regular    Nursing Comments:     Nicole Han      Again, thank you for allowing me to participate in the care of your patient.        Sincerely,        Robert Cooper MD    "

## 2022-02-04 NOTE — TELEPHONE ENCOUNTER
Prior Authorization Retail Medication Request    Medication/Dose: Eletriptan  ICD code (if different than what is on RX):    Previously Tried and Failed:  maxalt 10 mg; patient has used since 2005  Rationale:  Medication has been previously prescribed for migraines and covered. PA requested.   KATHERINE Jaramillo CNP    Insurance Name:  BC/ Out of State  Insurance ID:  [id number]       Pharmacy Information (if different than what is on RX)  Name:    Phone:

## 2022-02-07 ENCOUNTER — TELEPHONE (OUTPATIENT)
Dept: NEUROSURGERY | Facility: CLINIC | Age: 52
End: 2022-02-07
Payer: COMMERCIAL

## 2022-02-08 ENCOUNTER — VIRTUAL VISIT (OUTPATIENT)
Dept: NEUROSURGERY | Facility: CLINIC | Age: 52
End: 2022-02-08
Payer: COMMERCIAL

## 2022-02-08 DIAGNOSIS — G50.0 TRIGEMINAL NEURALGIA: Primary | ICD-10-CM

## 2022-02-08 PROCEDURE — 99215 OFFICE O/P EST HI 40 MIN: CPT | Mod: 95 | Performed by: NEUROLOGICAL SURGERY

## 2022-02-08 NOTE — LETTER
2/8/2022       RE: Jossue Torres  [address] [facility] [address]     Dear Colleague,    Thank you for referring your patient, Jossue Torres, to the [facility]UROS[facility] [place] at [facility]. Please see a copy of my visit note below.      Service Date: 02/08/2022    I had the pleasure to talk to Elmo today during a neurosurgical phone clinic visit.    Briefly, Elmo is a 51-year-old gentleman who has been referred for right-sided trigeminal neuralgia.    Elmo over the last couple of years has developed sharp shooting pain on the right side of his face that migrates down into his lower jaw.  He says that the pain is extremely sharp and reminds him of the time when he was electrocuted as a child.  The pain comes on out of nowhere and only last about 30 seconds and then goes away, but he will have multiple of these episodes throughout the course of the day.  It seems like over time, he is having more of these episodes.  He also notes that there are number of stimuli from wind to touching his face to eating that will trigger this pain.    He was started on Trileptal.  Initially, this worked very well, but then started to wean off.  Subsequently, gabapentin was added to this regimen.  He is on gabapentin and Trileptal.  With this, he has some treatment effect, but he is still having significant pain.    Over the last couple of weeks, he has actually been in a spell where he has not had as severe pain.    He has had brain MRIs done on 02/02/2022 and on 10/15/2021.  The 10/15/2021 shows the trigeminal nerve better.  On this, it does appear that there is a loop of the superior cerebellar artery in addition to a vein that are compressing the trigeminal nerve on the right.    I do believe that he has V3 trigeminal neuralgia on the right.  He is failing medical management and I think it would be an appropriate surgical candidate.  Today we talked about  different surgical options including radiosurgery, radiofrequency rhizotomy, balloon rhizotomy and microvascular decompression.  Given his age and the MRI findings, I feel that a microvascular decompression would be the best option for him.  We discussed the pros and cons of all the different procedures including risks and benefits.   microvascular decompression.  We went through the surgery in great detail.  I talked about the risks including 0.5% risk of mortality and 1.6% risk of ischemic or hemorrhagic stroke.  He understands the risks and at this point in time would like to think about things.  He would like to have a repeat visit with me in about a month.    Grey Simeon MD        D: 2022   T: 2022   MT: ron    Name:     CHIVO PORTERJose Roberto  MRN:      [id number]        Account:      [id number]   :      1970           Service Date: 2022       Document: [id number]

## 2022-02-08 NOTE — PROGRESS NOTES
Elmo is a 51 year old who is being evaluated via a billable video visit.    Video-Visit Details    Type of service:  Video Visit    Video Time: 20 min     Chuck Agustin    Please see dictation for visit details.

## 2022-02-08 NOTE — PROGRESS NOTES
Service Date: 02/08/2022    I had the pleasure to talk to Elmo today during a neurosurgical phone clinic visit.    Briefly, Elmo is a 51-year-old gentleman who has been referred for right-sided trigeminal neuralgia.    Elmo over the last couple of years has developed sharp shooting pain on the right side of his face that migrates down into his lower jaw.  He says that the pain is extremely sharp and reminds him of the time when he was electrocuted as a child.  The pain comes on out of nowhere and only last about 30 seconds and then goes away, but he will have multiple of these episodes throughout the course of the day.  It seems like over time, he is having more of these episodes.  He also notes that there are number of stimuli from wind to touching his face to eating that will trigger this pain.    He was started on Trileptal.  Initially, this worked very well, but then started to wean off.  Subsequently, gabapentin was added to this regimen.  He is on gabapentin and Trileptal.  With this, he has some treatment effect, but he is still having significant pain.    Over the last couple of weeks, he has actually been in a spell where he has not had as severe pain.    He has had brain MRIs done on 02/02/2022 and on 10/15/2021.  The 10/15/2021 shows the trigeminal nerve better.  On this, it does appear that there is a loop of the superior cerebellar artery in addition to a vein that are compressing the trigeminal nerve on the right.    I do believe that he has V3 trigeminal neuralgia on the right.  He is failing medical management and I think it would be an appropriate surgical candidate.  Today we talked about different surgical options including radiosurgery, radiofrequency rhizotomy, balloon rhizotomy and microvascular decompression.  Given his age and the MRI findings, I feel that a microvascular decompression would be the best option for him.  We discussed the pros and cons of all the different procedures including  risks and benefits.   microvascular decompression.  We went through the surgery in great detail.  I talked about the risks including 0.5% risk of mortality and 1.6% risk of ischemic or hemorrhagic stroke.  He understands the risks and at this point in time would like to think about things.  He would like to have a repeat visit with me in about a month.    Grey Simeon MD        D: 2022   T: 2022   MT: ron    Name:     CHIVO PORTER DORIAN  MRN:      [id number]        Account:      [id number]   :      1970           Service Date: 2022       Document: [id number]

## 2022-02-09 NOTE — TELEPHONE ENCOUNTER
Prior Authorization Approval    Authorization Effective Date: 2/9/2022  Authorization Expiration Date: 2/8/2025  Medication: Eletriptan  Approved Dose/Quantity:   Reference #: [id number]   Insurance Company: [employer] - Phone [phone] Fax [phone]  Which Pharmacy is filling the prescription (Not needed for infusion/clinic administered): [facility] [address] [facility]  Pharmacy Notified: Yes  Patient Notified: Yes

## 2022-02-09 NOTE — TELEPHONE ENCOUNTER
PA Initiation    Medication: Eletriptan  Insurance Company: [employer] - Phone [phone] Fax [phone]  Pharmacy Filling the Rx: [facility] [place] [facility] [address] [address] [facility]  Filling Pharmacy Phone: [phone]  Filling Pharmacy Fax: [phone]  Start Date: 2/9/2022

## 2022-02-11 ENCOUNTER — OFFICE VISIT (OUTPATIENT)
Dept: FAMILY MEDICINE | Facility: CLINIC | Age: 52
End: 2022-02-11
Payer: COMMERCIAL

## 2022-02-11 VITALS
DIASTOLIC BLOOD PRESSURE: 86 MMHG | HEIGHT: 71 IN | TEMPERATURE: 97.6 F | BODY MASS INDEX: 29.54 KG/M2 | WEIGHT: 211 LBS | RESPIRATION RATE: 14 BRPM | OXYGEN SATURATION: 98 % | HEART RATE: 80 BPM | SYSTOLIC BLOOD PRESSURE: 138 MMHG

## 2022-02-11 DIAGNOSIS — G50.0 TRIGEMINAL NEURALGIA: ICD-10-CM

## 2022-02-11 DIAGNOSIS — G43.709 CHRONIC MIGRAINE WITHOUT AURA WITHOUT STATUS MIGRAINOSUS, NOT INTRACTABLE: ICD-10-CM

## 2022-02-11 DIAGNOSIS — I10 HYPERTENSION GOAL BP (BLOOD PRESSURE) < 140/90: Primary | ICD-10-CM

## 2022-02-11 LAB
ANION GAP SERPL CALCULATED.3IONS-SCNC: 4 MMOL/L (ref 3–14)
BUN SERPL-MCNC: 14 MG/DL (ref 7–30)
CALCIUM SERPL-MCNC: 9.1 MG/DL (ref 8.5–10.1)
CHLORIDE BLD-SCNC: 107 MMOL/L (ref 94–109)
CO2 SERPL-SCNC: 28 MMOL/L (ref 20–32)
CREAT SERPL-MCNC: 1.14 MG/DL (ref 0.66–1.25)
GFR SERPL CREATININE-BSD FRML MDRD: 77 ML/MIN/1.73M2
GLUCOSE BLD-MCNC: 79 MG/DL (ref 70–99)
POTASSIUM BLD-SCNC: 3.9 MMOL/L (ref 3.4–5.3)
SODIUM SERPL-SCNC: 139 MMOL/L (ref 133–144)

## 2022-02-11 PROCEDURE — 36415 COLL VENOUS BLD VENIPUNCTURE: CPT | Performed by: NURSE PRACTITIONER

## 2022-02-11 PROCEDURE — 80048 BASIC METABOLIC PNL TOTAL CA: CPT | Performed by: NURSE PRACTITIONER

## 2022-02-11 PROCEDURE — 99214 OFFICE O/P EST MOD 30 MIN: CPT | Performed by: NURSE PRACTITIONER

## 2022-02-11 RX ORDER — TIZANIDINE HYDROCHLORIDE 4 MG/1
4 CAPSULE, GELATIN COATED ORAL 3 TIMES DAILY
Qty: 90 CAPSULE | Refills: 2 | Status: SHIPPED | OUTPATIENT
Start: 2022-02-11 | End: 2022-07-06

## 2022-02-11 RX ORDER — ELETRIPTAN HYDROBROMIDE 40 MG/1
TABLET, FILM COATED ORAL
Qty: 24 TABLET | Refills: 11 | Status: SHIPPED | OUTPATIENT
Start: 2022-02-11 | End: 2022-12-12

## 2022-02-11 ASSESSMENT — PAIN SCALES - GENERAL: PAINLEVEL: MILD PAIN (2)

## 2022-02-11 ASSESSMENT — MIFFLIN-ST. JEOR: SCORE: 1829.22

## 2022-02-11 NOTE — PROGRESS NOTES
"  Assessment & Plan     Trigeminal neuralgia  Reviewed notes from neurology and neurosurgery.  Recommend with continuing the oxcarbazepine as well as the gabapentin for management of acute pain.  In addition discussed recommendations regarding potential neurosurgical procedure to reduce compression of the trigeminal nerve.  Refilled tizanidine for patient as needed for muscle spasms.  Follow-up for blood pressure recheck in a couple weeks.  Or sooner if needed.  - tiZANidine (ZANAFLEX) 4 MG capsule; Take 1 capsule (4 mg) by mouth 3 times daily    Chronic migraine without aura without status migrainosus, not intractable  Refilled Relpax for patient that this has worked well for his chronic migraine management.  Will recheck at basic metabolic panel given use of oxcarbazepine and the potential reduction of sodiums.  - Basic metabolic panel  (Ca, Cl, CO2, Creat, Gluc, K, Na, BUN); Future  - eletriptan (RELPAX) 40 MG tablet; Take 1 tablet by mouth. repeat after 2 hours if needed.  - Basic metabolic panel  (Ca, Cl, CO2, Creat, Gluc, K, Na, BUN)    Hypertension goal BP (blood pressure) < 140/90  Patient recently not taking the hydrochlorothiazide.  Encouraged him to start taking this again to see if it will reduce his blood pressures.  Follow-up for blood pressure recheck in a couple weeks.  He is in agreement this plan.               Tobacco Cessation:   reports that he has been smoking cigarettes. He has a 7.50 pack-year smoking history. He has quit using smokeless tobacco.  His smokeless tobacco use included chew.      BMI:   Estimated body mass index is 29.43 kg/m  as calculated from the following:    Height as of this encounter: 1.803 m (5' 11\").    Weight as of this encounter: 95.7 kg (211 lb).           Return in about 4 weeks (around 3/11/2022) for hypertension follow up. .    KATHERINE Jaramillo CNP  [facility]   Elmo is a 51 year old who presents for the following " health issues     History of Present Illness       He eats 0-1 servings of fruits and vegetables daily.He consumes 5 sweetened beverage(s) daily.He exercises with enough effort to increase his heart rate 9 or less minutes per day.  He exercises with enough effort to increase his heart rate 3 or less days per week. He is missing 1 dose(s) of medications per week.  He is not taking prescribed medications regularly due to remembering to take.       Pain History:  When did you first notice your pain? - More than 6 weeks   Have you seen this provider for your pain in the past?   Yes   Where in your body do you have pain? Right side jaw  Are you seeing anyone else for your pain? Yes - neurology,surgeon    He has had improvement the last 3 weeks. He is taking the oxcarbazepine three times daily and gabapentin three times daily. Tizanidine as needed did help but he needs a refill today. He has met with neurosurgery and there is a recommendation for surgery.       Blood pressure continues to fluctuate. He is not taking the hydrochlorothiazide currently as he had a low blood pressure.     Gabapentin has helped with his migraines. This week he has had more. Typically eletriptan is beneficial for him.    Since last time I saw patient he has seen neurology as well as neurosurgery.  Neurology had recommended he follow-up with dental to see if there is any changes with his general oral health.  In addition discussed recommendations from neurosurgery to consider decompression of the trigeminal nerve through surgical means.  These notes were both reviewed in [facility].    PHQ-9 SCORE 1/10/2022   PHQ-9 Total Score 11       BRODIE-7 SCORE 1/10/2022   Total Score 7               Chronic Pain Follow Up:    Location of pain: right side jaw  Analgesia/pain control:    - Recent changes:  Got a little better in the last three weeks    - Overall control: Tolerable with discomfort    - Current treatments: gabapentin, OXcarbazepine  Adherence:     -  "Do you ever take more pain medicine than prescribed? No    - When did you take your last dose of pain medicine?  Gabapentin 10 AM   Adverse effects: yes little light headed once in awhile  PDMP Review       Value Time User    State PDMP site checked  Yes 2/11/2022  5:12 PM Tamia Yeh APRN CNP        Last CSA Agreement:   CSA -- Patient Level:    CSA: None found at the patient level.       Last UDS: Pt is not taking chronic narcotics.     Review of Systems   Constitutional, HEENT, cardiovascular, pulmonary, gi and gu systems are negative, except as otherwise noted.      Objective    /86   Pulse 80   Temp 97.6  F (36.4  C) (Tympanic)   Resp 14   Ht 1.803 m (5' 11\")   Wt 95.7 kg (211 lb)   SpO2 98%   BMI 29.43 kg/m    Body mass index is 29.43 kg/m .  Physical Exam   GENERAL: healthy, alert and no distress  NECK: no adenopathy, no asymmetry, masses, or scars and thyroid normal to palpation  RESP: lungs clear to auscultation - no rales, rhonchi or wheezes  CV: regular rate and rhythm, normal S1 S2, no S3 or S4, no murmur, click or rub, no peripheral edema and peripheral pulses strong  ABDOMEN: soft, nontender, no hepatosplenomegaly, no masses and bowel sounds normal  MS: no gross musculoskeletal defects noted, no edema                "

## 2022-02-14 ENCOUNTER — MYC MEDICAL ADVICE (OUTPATIENT)
Dept: NEUROSURGERY | Facility: CLINIC | Age: 52
End: 2022-02-14
Payer: COMMERCIAL

## 2022-02-14 NOTE — TELEPHONE ENCOUNTER
Attn: Sara Baker RNCC for Dr. Simeon  *Follow up questions from provider visit.     Esther Nobles LPN  Neurosurgery

## 2022-02-16 NOTE — TELEPHONE ENCOUNTER
Outbound call to Elmo.  Left message to please return call for further questions, My Chart message sent.

## 2022-02-24 ENCOUNTER — TELEPHONE (OUTPATIENT)
Dept: NEUROSURGERY | Facility: CLINIC | Age: 52
End: 2022-02-24
Payer: COMMERCIAL

## 2022-02-24 NOTE — TELEPHONE ENCOUNTER
Right MVD offered by Dr Simeon on 2/8/22    Patient calling stating he is ready to schedule Right sided Microvascular Decompression offered by Dr Simeon on 2/8/22.    Will discuss with Dr Simeon and return call to patient    Voices understanding.

## 2022-02-25 ENCOUNTER — TELEPHONE (OUTPATIENT)
Dept: NEUROSURGERY | Facility: CLINIC | Age: 52
End: 2022-02-25
Payer: COMMERCIAL

## 2022-02-25 DIAGNOSIS — Z11.59 ENCOUNTER FOR SCREENING FOR OTHER VIRAL DISEASES: Primary | ICD-10-CM

## 2022-02-25 DIAGNOSIS — G50.0 TRIGEMINAL NEURALGIA: Primary | ICD-10-CM

## 2022-02-25 NOTE — TELEPHONE ENCOUNTER
Attempt #1:    I called and left a message for the patient in regards to scheduling surgery with Dr. Simeon.      Jhoana Acosta on 2/25/2022 at 2:01 PM

## 2022-02-25 NOTE — TELEPHONE ENCOUNTER
FUTURE VISIT INFORMATION      SURGERY INFORMATION:    Date: 3/10/22    Location: uu or    Surgeon:  Grey Simeon MD    Anesthesia Type:  general    Procedure: Right microvascular decompression    RECORDS REQUESTED FROM:        Primary Care Provider: Tamia Yeh APRN [facility]    Pertinent Medical History: hypertension    Most recent Cardiac Stress Test: 7/24/2006

## 2022-02-27 ENCOUNTER — HEALTH MAINTENANCE LETTER (OUTPATIENT)
Age: 52
End: 2022-02-27

## 2022-02-28 NOTE — TELEPHONE ENCOUNTER
Case request for Right sided MVD completed, will call patient after Surgery scheduling has it scheduled with Dr Simeon.

## 2022-03-01 ENCOUNTER — TELEPHONE (OUTPATIENT)
Dept: NEUROSURGERY | Facility: CLINIC | Age: 52
End: 2022-03-01
Payer: COMMERCIAL

## 2022-03-01 NOTE — TELEPHONE ENCOUNTER
Attempt #1    Called patient back in regards to rescheduling surgery with Dr. Simeon, but patient did not answer.       Jhoana Acosta on 3/1/2022 at 11:16 AM

## 2022-03-03 ENCOUNTER — PRE VISIT (OUTPATIENT)
Dept: SURGERY | Facility: CLINIC | Age: 52
End: 2022-03-03
Payer: COMMERCIAL

## 2022-03-07 ENCOUNTER — TELEPHONE (OUTPATIENT)
Dept: NEUROSURGERY | Facility: CLINIC | Age: 52
End: 2022-03-07
Payer: COMMERCIAL

## 2022-03-07 DIAGNOSIS — I10 ESSENTIAL HYPERTENSION WITH GOAL BLOOD PRESSURE LESS THAN 140/90: ICD-10-CM

## 2022-03-07 RX ORDER — HYDROCHLOROTHIAZIDE 25 MG/1
25 TABLET ORAL DAILY
Qty: 30 TABLET | Refills: 0 | Status: ON HOLD | OUTPATIENT
Start: 2022-03-07 | End: 2022-04-06

## 2022-03-07 NOTE — TELEPHONE ENCOUNTER
"Requested Prescriptions   Pending Prescriptions Disp Refills     hydrochlorothiazide (HYDRODIURIL) 25 MG tablet 30 tablet 0     Sig: Take 1 tablet (25 mg) by mouth daily       Diuretics (Including Combos) Protocol Passed - 3/7/2022 10:04 AM        Passed - Blood pressure under 140/90 in past 12 months     BP Readings from Last 3 Encounters:   02/11/22 138/86   02/03/22 (!) 158/98   01/10/22 96/62                 Passed - Recent (12 mo) or future (30 days) visit within the authorizing provider's specialty     Patient has had an office visit with the authorizing provider or a provider within the authorizing providers department within the previous 12 mos or has a future within next 30 days. See \"Patient Info\" tab in inbasket, or \"Choose Columns\" in Meds & Orders section of the refill encounter.              Passed - Medication is active on med list        Passed - Patient is age 18 or older        Passed - Normal serum creatinine on file in past 12 months     Recent Labs   Lab Test 02/11/22  1625   CR 1.14              Passed - Normal serum potassium on file in past 12 months     Recent Labs   Lab Test 02/11/22  1625   POTASSIUM 3.9                    Passed - Normal serum sodium on file in past 12 months     Recent Labs   Lab Test 02/11/22  1625                      "

## 2022-03-07 NOTE — TELEPHONE ENCOUNTER
I called the patient in regards to scheduling surgery with Dr. Simeon. Patient has a covid test scheduled at [facility] [place] and pre op has been taken care of with PAC via video. Patient is aware that the nursing team will be reaching out within the next few days. I did tell patient that a nurse will reach out within 2-3 days prior to surgery with arrival time and instructions.    Surgeon: Dr. Simeon  Date of Surgery: 4/7/2022  Location of surgery: [place] OR  Pre-Op H&P: 3/28/2022  Post-Op Appt Date: 4/20/2022   Imaging needed:  No  Discussed COVID-19 testing:  Yes  Pre-cert/Authorization completed:  Yes  Patient aware that pre-op RN will call 2-3 days prior to surgery with arrival time and instructions Yes  Packet sent out: No 03/07/22  Patient was instructed to review packet and call back with any questions or concerns.     Jhoana Acosta on 3/7/2022 at 2:40 PM

## 2022-03-08 NOTE — TELEPHONE ENCOUNTER
FUTURE VISIT INFORMATION        SURGERY INFORMATION:    Date: 4/7/22    Location: uu or    Surgeon:  Grey Simeon MD    Anesthesia Type:  general    Procedure: Right microvascular decompression     RECORDS REQUESTED FROM:          Primary Care Provider: Tamia Yeh [facility]N [facility]     Pertinent Medical History: hypertension     Most recent Cardiac Stress Test: 7/24/2006

## 2022-03-14 ENCOUNTER — MYC MEDICAL ADVICE (OUTPATIENT)
Dept: FAMILY MEDICINE | Facility: CLINIC | Age: 52
End: 2022-03-14
Payer: COMMERCIAL

## 2022-03-14 DIAGNOSIS — R68.84 JAW PAIN: ICD-10-CM

## 2022-03-14 DIAGNOSIS — G50.0 TRIGEMINAL NEURALGIA: ICD-10-CM

## 2022-03-14 RX ORDER — GABAPENTIN 600 MG/1
600 TABLET ORAL 3 TIMES DAILY
Qty: 90 TABLET | Refills: 0 | Status: ON HOLD | OUTPATIENT
Start: 2022-03-14 | End: 2022-04-06

## 2022-03-14 RX ORDER — OXCARBAZEPINE 300 MG/1
TABLET, FILM COATED ORAL
Qty: 270 TABLET | Refills: 0 | Status: ON HOLD | OUTPATIENT
Start: 2022-03-14 | End: 2022-04-06

## 2022-03-14 NOTE — TELEPHONE ENCOUNTER
Routing refill request to provider for review/approval because:  Drug not on the [facility] refill protocol     Pending Prescriptions:                       Disp   Refills    OXcarbazepine (TRILEPTAL) 300 MG tablet    270 ta*0        Sig: Take 2 tabs in the morning and 1 tab at night.    gabapentin (NEURONTIN) 600 MG tablet       90 tab*0        Sig: Take 1 tablet (600 mg) by mouth 3 times daily    Leelee Thomas RN on 3/14/2022 at 10:58 AM

## 2022-03-23 ENCOUNTER — MYC MEDICAL ADVICE (OUTPATIENT)
Dept: NEUROSURGERY | Facility: CLINIC | Age: 52
End: 2022-03-23
Payer: COMMERCIAL

## 2022-03-23 RX ORDER — CEFAZOLIN SODIUM/WATER 2 G/20 ML
2 SYRINGE (ML) INTRAVENOUS SEE ADMIN INSTRUCTIONS
Status: CANCELLED | OUTPATIENT
Start: 2022-03-23

## 2022-03-23 RX ORDER — CEFAZOLIN SODIUM/WATER 2 G/20 ML
2 SYRINGE (ML) INTRAVENOUS
Status: CANCELLED | OUTPATIENT
Start: 2022-03-23

## 2022-03-25 RX ORDER — SENNOSIDES 8.6 MG
1300 CAPSULE ORAL EVERY 8 HOURS PRN
COMMUNITY

## 2022-03-25 RX ORDER — HYDROCODONE BITARTRATE AND ACETAMINOPHEN 5; 325 MG/1; MG/1
1 TABLET ORAL EVERY 6 HOURS PRN
Status: ON HOLD | COMMUNITY
End: 2022-05-07

## 2022-03-25 NOTE — PHARMACY - PREOPERATIVE ASSESSMENT CENTER
Preoperative Assessment Center Medication History Note    Medication history completed on March 25, 2022 by this writer. See Epic admission navigator for prior to admission medications. Operating room staff will still need to confirm medications and last dose information on day of surgery.     Medication history interview sources  Patient interview: Yes  Care Everywhere records: No  Surescripts pharmacy refill records: Yes  Other (if applicable):     Changes made to PTA medication list  Added: norco, excedrin migraine.   Deleted: none  Changed: tylenol dose/sig/dosage form.     Additional medication history information (including reliability of information, actions taken by pharmacist):    -- No recent (within 30 days) course of antibiotics  -- No recent (within 30 days) course of systemic steroids  -- Patient reports not being on blood thinning medications  - except PRN excedrin migraine - pt aware to hold any aspirin containing products 7 days pre op.   -- Patient declines being on any other prescription or over-the-counter medications    Prior to Admission medications    Medication Sig Last Dose Taking? Auth Provider   acetaminophen (TYLENOL 8 HOUR) 650 MG CR tablet Take 1,300 mg by mouth every 8 hours as needed for mild pain or fever Taking Yes Unknown, Entered By History   eletriptan (RELPAX) 40 MG tablet Take 1 tablet by mouth. repeat after 2 hours if needed. Taking Yes Tamia Yeh APRN CNP   gabapentin (NEURONTIN) 600 MG tablet Take 1 tablet (600 mg) by mouth 3 times daily Taking Yes Tamia Yeh APRN CNP   hydrochlorothiazide (HYDRODIURIL) 25 MG tablet Take 1 tablet (25 mg) by mouth daily Taking Yes Tamia Yeh APRN CNP   HYDROcodone-acetaminophen (NORCO) 5-325 MG tablet Take 1 tablet by mouth every 6 hours as needed for severe pain Taking Yes Unknown, Entered By History   ibuprofen 200 MG capsule Take 200 mg by mouth every 4 hours as needed for fever  Taking Yes Reported, Patient   losartan  (COZAAR) 100 MG tablet Take 1 tablet (100 mg) by mouth daily  Patient taking differently: Take 100 mg by mouth At Bedtime  Taking Yes Tamia Yeh APRN CNP   OXcarbazepine (TRILEPTAL) 300 MG tablet Take 2 tabs in the morning and 1 tab at night. Taking Yes Tamia Yeh APRN CNP   simvastatin (ZOCOR) 40 MG tablet Take 1 tablet (40 mg) by mouth At Bedtime Taking Yes Tamia Yeh APRN CNP   tiZANidine (ZANAFLEX) 4 MG capsule Take 1 capsule (4 mg) by mouth 3 times daily Taking Yes Tamia Yeh APRN CNP          Medication history completed by: Edenilson Galdamez [facility]

## 2022-03-28 ENCOUNTER — ANESTHESIA EVENT (OUTPATIENT)
Dept: SURGERY | Facility: CLINIC | Age: 52
End: 2022-03-28
Payer: COMMERCIAL

## 2022-03-28 ENCOUNTER — PRE VISIT (OUTPATIENT)
Dept: SURGERY | Facility: CLINIC | Age: 52
End: 2022-03-28
Payer: COMMERCIAL

## 2022-03-28 ENCOUNTER — VIRTUAL VISIT (OUTPATIENT)
Dept: SURGERY | Facility: CLINIC | Age: 52
End: 2022-03-28
Payer: COMMERCIAL

## 2022-03-28 DIAGNOSIS — Z01.818 PRE-OP EVALUATION: Primary | ICD-10-CM

## 2022-03-28 PROCEDURE — 99203 OFFICE O/P NEW LOW 30 MIN: CPT | Mod: 95 | Performed by: PHYSICIAN ASSISTANT

## 2022-03-28 ASSESSMENT — LIFESTYLE VARIABLES: TOBACCO_USE: 1

## 2022-03-28 ASSESSMENT — PAIN SCALES - GENERAL: PAINLEVEL: MODERATE PAIN (4)

## 2022-03-28 NOTE — PATIENT INSTRUCTIONS
Preparing for Your Surgery      Name:  Jossue Torres   MRN:  [id number]   :  1970   Today's Date:  3/28/2022       Arriving for surgery:  Surgery date:  2022  Arrival time:  5:30 am    Restrictions due to COVID 19       Effective 22 [facility] is implementing the following visitor policy:     1 person may accompany the patient through the Pre-Op process.      That same person may wait in the Surgery Waiting room, provided there is enough room to social distance         Inpatients are allowed 2 visitors per day for the duration of their stay.        Visitors must wear a mask.      Visitors must not be ill.      Visiting hours are 8 am to 8 pm.    [employer] parking is available for anyone with mobility limitations or disabilities.  ([place]  24 hours/ 7 days a week; [facility]  7 am- 3:30 pm, Mon- Fri)    Please come to:     [facility] [address]  [address] [place] [address]     -    Please proceed to [address] on the 3rd floor. [phone]?     - ?If you are in need of directions, wheelchair or escort please stop at the Information Desk in the lobby.       What can I eat or drink?  -  You may eat and drink normally for up to 8 hours before your surgery. (Until 22 at 11 pm)  -  You may have clear liquids until 2 hours before surgery. (Until 5:30 am arrival time)    Examples of clear liquids:  Water  Clear broth  Juices (apple, white grape, white cranberry  and cider) without pulp  Noncarbonated, powder based beverages  (lemonade and Scott-Aid)  Sodas (Sprite, 7-Up, ginger ale and seltzer)  Coffee or tea (without milk or cream)  Gatorade    -  No Alcohol for at least 24 hours before surgery     Which medicines can I take?    Hold Aspirin (EXCEDRIN MIGRAINE) for 7 days before surgery.     Hold Multivitamins for 7 days before surgery.  Hold Supplements for 7 days before surgery.    Hold Ibuprofen (Advil, Motrin) for 1 day before  surgery.  Hold Naproxen (Aleve) for 4 days before surgery.    Hold Elitriptan for at least 24 hours before surgery     -  DO NOT take these medications the day of surgery:  Hydrochlorothiazide         -  PLEASE TAKE these medications the day of surgery:  Tylenol if needed  Gabapentin  Hydrocodone if needed  Oxcarbazepine   Tizanidine if needed       How do I prepare myself?  - Please take 2 showers before surgery using Scrubcare or Hibiclens soap.    Use this soap only from the neck to your toes.     Leave the soap on your skin for one minute--then rinse thoroughly.      You may use your own shampoo and conditioner; no other hair products.   - Please remove all jewelry and body piercings.  - No lotions, deodorants or fragrance.  - No makeup or fingernail polish.   - Bring your ID and insurance card.    -If you have a Deep Brain Stimulator, Spinal Cord Stimulator or any neuro stimulator device---you must bring the remote control to the hospital     - All patients are required to have a Covid-19 test within 4 days of surgery/procedure.      -Patients will be contacted by the [facility] scheduling team within 1 week of surgery to make an appointment.      - Patients may call the Scheduling team at [phone] if they have not been scheduled within 4 days of  surgery.          Questions or Concerns:    - For any questions regarding the day of surgery or your hospital stay, please contact the Pre Admission Nursing Office at [phone].       - If you have health changes between today and your surgery please call your surgeon.       For questions after surgery please call your surgeons office.

## 2022-03-28 NOTE — PROGRESS NOTES
Elmo is a 51 year old who is being evaluated via a billable video visit.      How would you like to obtain your AVS? MyChart  If the video visit is dropped, the invitation should be resent by: Text to cell phone: [phone]       HPI         Review of Systems         Objective    Vitals - Patient Reported  Pain Score: Moderate Pain (4) (Jaw)        Physical Exam

## 2022-03-28 NOTE — H&P
Pre-Operative H & P     CC:  Preoperative exam to assess for increased cardiopulmonary risk while undergoing surgery and anesthesia.    Date of Encounter: 3/28/2022  Primary Care Physician:  Tamia Yeh     Reason for visit:   Encounter Diagnosis   Name Primary?     Pre-op evaluation Yes       HPI  Jossue Torres is a 51 year old male who presents for pre-operative H & P in preparation for  Procedure Information     Case: [id number] Date/Time: 04/07/22 0730    Procedure: Right microvascular decompression (Right Head)    Anesthesia type: General    Diagnosis: Trigeminal neuralgia [G50.0]    Pre-op diagnosis: Trigeminal neuralgia [G50.0]    Location:  OR [address] [place] [address] OR    Providers: Grey Simeon MD          Patient is being evaluated for comorbid conditions of Hypertension, dyslipidemia, tobacco use, migraines, GERD    Mr. Torres has a history of sharp, shooting pain on the right side of his face that radiates into his lower jaw. The pain has been happening intermittently over the last couple years. He has tried Trileptal and gabapentin with some effect, but continues to have significant pain. He was seen by Dr. Simeon for further evaluation. He is now scheduled for the above procedure.     History is obtained from the patient and chart review    Hx of abnormal bleeding or anti-platelet use: denies      Past Medical History  Past Medical History:   Diagnosis Date     Herpes zoster without mention of complication 1991       Past Surgical History  Past Surgical History:   Procedure Laterality Date     SURGICAL HISTORY OF -   9/9/69    right inguinal hernioplasty     SURGICAL HISTORY OF -   11/16/93    lysis of two penile adhesions and cauterization of penile condyloma.       Prior to Admission Medications  Current Outpatient Medications   Medication Sig Dispense Refill     acetaminophen (TYLENOL 8 HOUR) 650 MG CR tablet Take 1,300 mg by mouth every 8 hours as needed for mild pain or fever        aspirin-acetaminophen-caffeine (EXCEDRIN MIGRAINE) 250-250-65 MG tablet Take 1-2 tablets by mouth every 6 hours as needed for headaches       eletriptan (RELPAX) 40 MG tablet Take 1 tablet by mouth. repeat after 2 hours if needed. 24 tablet 11     gabapentin (NEURONTIN) 600 MG tablet Take 1 tablet (600 mg) by mouth 3 times daily 90 tablet 0     hydrochlorothiazide (HYDRODIURIL) 25 MG tablet Take 1 tablet (25 mg) by mouth daily 30 tablet 0     HYDROcodone-acetaminophen (NORCO) 5-325 MG tablet Take 1 tablet by mouth every 6 hours as needed for severe pain       ibuprofen 200 MG capsule Take 200 mg by mouth every 4 hours as needed for fever        losartan (COZAAR) 100 MG tablet Take 1 tablet (100 mg) by mouth daily (Patient taking differently: Take 100 mg by mouth At Bedtime ) 90 tablet 3     OXcarbazepine (TRILEPTAL) 300 MG tablet Take 2 tabs in the morning and 1 tab at night. 270 tablet 0     simvastatin (ZOCOR) 40 MG tablet Take 1 tablet (40 mg) by mouth At Bedtime 90 tablet 3     tiZANidine (ZANAFLEX) 4 MG capsule Take 1 capsule (4 mg) by mouth 3 times daily 90 capsule 2       Allergies  Allergies   Allergen Reactions     Nkda [No Known Drug Allergies]        Social History  Social History     Socioeconomic History     Marital status: Single     Spouse name: Not on file     Number of children: Not on file     Years of education: Not on file     Highest education level: Not on file   Occupational History     Not on file   Tobacco Use     Smoking status: Current Every Day Smoker     Packs/day: 0.50     Years: 15.00     Pack years: 7.50     Types: Cigarettes     Smokeless tobacco: Former User     Types: Chew   Vaping Use     Vaping Use: Never used   Substance and Sexual Activity     Alcohol use: Yes     Comment: rare     Drug use: No     Sexual activity: Yes     Partners: Female   Other Topics Concern     Parent/sibling w/ CABG, MI or angioplasty before 65F 55M? No   Social History Narrative     Not on file      Social Determinants of Health     Financial Resource Strain: Not on file   Food Insecurity: Not on file   Transportation Needs: Not on file   Physical Activity: Not on file   Stress: Not on file   Social Connections: Not on file   Intimate Partner Violence: Not on file   Housing Stability: Not on file       Family History  Family History   Problem Relation Age of Onset     Hypertension Father      Hypertension Brother      C.A.D. Maternal Grandmother         40's     C.A.D. Maternal Aunt         40's     C.A.D. Maternal Uncle         40's     Anesthesia Reaction No family hx of      Deep Vein Thrombosis (DVT) No family hx of        Review of Systems  The complete review of systems is negative other than noted in the HPI or here.   Anesthesia Evaluation   Pt has had prior anesthetic.     No history of anesthetic complications       ROS/MED HX  ENT/Pulmonary:     (+) KIRSTEN risk factors, hypertension, tobacco use, Current use,     Neurologic: Comment: Trigeminal neuralgia    (+) migraines,     Cardiovascular:     (+) Dyslipidemia hypertension----- (-) taking anticoagulants/antiplatelets   METS/Exercise Tolerance: >4 METS    Hematologic:  - neg hematologic  ROS  (-) history of blood clots and history of blood transfusion   Musculoskeletal:  - neg musculoskeletal ROS     GI/Hepatic:    (-) GERD   Renal/Genitourinary:  - neg Renal ROS     Endo:  - neg endo ROS  (-) chronic steroid usage   Psychiatric/Substance Use:       Infectious Disease:  - neg infectious disease ROS     Malignancy:       Other:            Virtual visit -  No vitals were obtained    Physical Exam  Constitutional: Awake, alert, cooperative, no apparent distress, and appears stated age.  HENT: Normocephalic  Respiratory: non labored breathing   Neurologic: Awake, alert, oriented to name, place and time.   Neuropsychiatric: Calm, cooperative. Normal affect.      Prior Labs/Diagnostic Studies   All labs and imaging personally reviewed     Component       Latest Ref Rng & Units 12/9/2021 2/11/2022   WBC      4.0 - 11.0 10e3/uL 8.4    RBC Count      4.40 - 5.90 10e6/uL 4.91    Hemoglobin      13.3 - 17.7 g/dL 16.0    Hematocrit      40.0 - 53.0 % 46.5    MCV      78 - 100 fL 95    MCH      26.5 - 33.0 pg 32.6    MCHC      31.5 - 36.5 g/dL 34.4    RDW      10.0 - 15.0 % 13.0    Platelet Count      150 - 450 10e3/uL 247    % Neutrophils      % 60    % Lymphocytes      % 27    % Monocytes      % 7    % Eosinophils      % 6    % Basophils      % 1    Absolute Neutrophils      1.6 - 8.3 10e3/uL 5.0    Absolute Lymphocytes      0.8 - 5.3 10e3/uL 2.2    Absolute Monocytes      0.0 - 1.3 10e3/uL 0.6    Absolute Eosinophils      0.0 - 0.7 10e3/uL 0.5    Absolute Basophils      0.0 - 0.2 10e3/uL 0.1    Sodium      133 - 144 mmol/L  139   Potassium      3.4 - 5.3 mmol/L  3.9   Chloride      94 - 109 mmol/L  107   Carbon Dioxide      20 - 32 mmol/L  28   Anion Gap      3 - 14 mmol/L  4   Urea Nitrogen      7 - 30 mg/dL  14   Creatinine      0.66 - 1.25 mg/dL  1.14   Calcium      8.5 - 10.1 mg/dL  9.1   Glucose      70 - 99 mg/dL  79   GFR Estimate      >60 mL/min/1.73m2  77       EKG/ stress test - if available please see in ROS above   No results found.  No flowsheet data found.      The patient's records and results personally reviewed by this provider.     Outside records reviewed from: NA      Assessment      Jossue Torres is a 51 year old male seen as a PAC referral for risk assessment and optimization for anesthesia.    Plan/Recommendations  Pt will be optimized for the proposed procedure.  See below for details on the assessment, risk, and preoperative recommendations    NEUROLOGY  - No history of TIA, CVA or seizure  -Post Op delirium risk factors:  No risk identified   -migraines using eletriptan PRN  -trigeminal neuralgia using Neurontin and trileptal. Above procedure planned     ENT  - No current airway concerns.  Will need to be reassessed day of surgery.  Mallampati:  "Unable to assess  TM: Unable to assess    CARDIAC  - Hypertension using hydrochlorothiazide and cozaar  - Hyperlipidemia using zocor  -denies cardiac history or symptoms   - METS (Metabolic Equivalents)  Patient performs 4 or more METS exercise without symptoms            Total Score: 0      RCRI-Very low risk: Class 1 0.4% complication rate            Total Score: 0        PULMONARY  KIRSTEN Medium Risk            Total Score: 4    KIRSTEN: Snores loudly    KIRSTEN: Hypertension    KIRSTEN: Over 50 ys old    KIRSTEN: Male      - Denies asthma or inhaler use  - Tobacco History      History   Smoking Status     Current Every Day Smoker     Packs/day: 0.50     Years: 15.00     Types: Cigarettes   Smokeless Tobacco     Former User     Types: Chew       GI  PONV Medium Risk  Total Score: 2           1 AN PONV: Patient is not a current smoker    1 AN PONV: Intended Post Op Opioids        /RENAL  - Baseline Creatinine  WNL    ENDOCRINE    - BMI: Estimated body mass index is 29.43 kg/m  as calculated from the following:    Height as of 2/11/22: 1.803 m (5' 11\").    Weight as of 2/11/22: 95.7 kg (211 lb).  Overweight (BMI 25.0-29.9)  - No history of Diabetes Mellitus    HEME  VTE Low Risk 0.5%            Total Score: 2    VTE: Male        The patient is optimized for their procedure. AVS with information on surgery time/arrival time, meds and NPO status given by nursing staff. No further diagnostic testing indicated.    Please refer to the physical examination documented by the anesthesiologist in the anesthesia record on the day of surgery.    Video-Visit Details    Type of service:  Video Visit    Patient verbally consented to video service today: YES    Video Start Time: 1737  Video End Time (time video stopped): 1747    Originating Location (pt. Location): Home    Distant Location (provider location):  [facility] PREOPERATIVE ASSESSMENT [facility]     Mode of Communication:  Video Conference via [email or web address]  On the day of service:     Prep time: 14 " minutes  Visit time: 10 minutes  Documentation time: 7 minutes  ------------------------------------------  Total time: 31 minutes      Tara Unger PA-C  Preoperative Assessment Center  [facility]  Clinic and Surgery Center  Phone: [phone]  Fax: [phone]

## 2022-04-04 ENCOUNTER — LAB (OUTPATIENT)
Dept: LAB | Facility: CLINIC | Age: 52
End: 2022-04-04
Payer: COMMERCIAL

## 2022-04-04 DIAGNOSIS — Z11.59 ENCOUNTER FOR SCREENING FOR OTHER VIRAL DISEASES: ICD-10-CM

## 2022-04-04 PROCEDURE — U0005 INFEC AGEN DETEC AMPLI PROBE: HCPCS

## 2022-04-04 PROCEDURE — U0003 INFECTIOUS AGENT DETECTION BY NUCLEIC ACID (DNA OR RNA); SEVERE ACUTE RESPIRATORY SYNDROME CORONAVIRUS 2 (SARS-COV-2) (CORONAVIRUS DISEASE [COVID-19]), AMPLIFIED PROBE TECHNIQUE, MAKING USE OF HIGH THROUGHPUT TECHNOLOGIES AS DESCRIBED BY CMS-2020-01-R: HCPCS

## 2022-04-05 LAB — SARS-COV-2 RNA RESP QL NAA+PROBE: NEGATIVE

## 2022-04-06 ENCOUNTER — MYC REFILL (OUTPATIENT)
Dept: FAMILY MEDICINE | Facility: CLINIC | Age: 52
End: 2022-04-06
Payer: COMMERCIAL

## 2022-04-06 DIAGNOSIS — I10 ESSENTIAL HYPERTENSION WITH GOAL BLOOD PRESSURE LESS THAN 140/90: ICD-10-CM

## 2022-04-06 DIAGNOSIS — R68.84 JAW PAIN: ICD-10-CM

## 2022-04-06 DIAGNOSIS — G50.0 TRIGEMINAL NEURALGIA: ICD-10-CM

## 2022-04-06 ASSESSMENT — LIFESTYLE VARIABLES: TOBACCO_USE: 1

## 2022-04-06 NOTE — ANESTHESIA PREPROCEDURE EVALUATION
Pre-Operative H & P     CC:  Preoperative exam to assess for increased cardiopulmonary risk while undergoing surgery and anesthesia.    Date of Encounter: 3/28/2022  Primary Care Physician:  Tamia Yeh     Reason for visit:   No diagnosis found.    HPI  Jossue Torres is a 51 year old male who presents for pre-operative H & P in preparation for  Procedure Information     Case: [id number] Date/Time: 04/07/22 0730    Procedure: Right microvascular decompression  Latex Free (Right Head)    Anesthesia type: General    Diagnosis: Trigeminal neuralgia [G50.0]    Pre-op diagnosis: Trigeminal neuralgia [G50.0]    Location:  OR 01 /  OR    Providers: Grey Simeon MD          Patient is being evaluated for comorbid conditions of Hypertension, dyslipidemia, tobacco use, migraines, GERD    Mr. Torres has a history of sharp, shooting pain on the right side of his face that radiates into his lower jaw. The pain has been happening intermittently over the last couple years. He has tried Trileptal and gabapentin with some effect, but continues to have significant pain. He was seen by Dr. Simeon for further evaluation. He is now scheduled for the above procedure.     History is obtained from the patient and chart review    Hx of abnormal bleeding or anti-platelet use: denies      Past Medical History  Past Medical History:   Diagnosis Date     Herpes zoster without mention of complication 1991       Past Surgical History  Past Surgical History:   Procedure Laterality Date     SURGICAL HISTORY OF -   9/9/69    right inguinal hernioplasty     SURGICAL HISTORY OF -   11/16/93    lysis of two penile adhesions and cauterization of penile condyloma.       Prior to Admission Medications  Current Outpatient Medications   Medication Sig Dispense Refill     acetaminophen (TYLENOL 8 HOUR) 650 MG CR tablet Take 1,300 mg by mouth every 8 hours as needed for mild pain or fever       aspirin-acetaminophen-caffeine (EXCEDRIN MIGRAINE)  250-250-65 MG tablet Take 1-2 tablets by mouth every 6 hours as needed for headaches       eletriptan (RELPAX) 40 MG tablet Take 1 tablet by mouth. repeat after 2 hours if needed. 24 tablet 11     gabapentin (NEURONTIN) 600 MG tablet Take 1 tablet (600 mg) by mouth 3 times daily 90 tablet 0     hydrochlorothiazide (HYDRODIURIL) 25 MG tablet Take 1 tablet (25 mg) by mouth daily 30 tablet 0     HYDROcodone-acetaminophen (NORCO) 5-325 MG tablet Take 1 tablet by mouth every 6 hours as needed for severe pain       ibuprofen 200 MG capsule Take 200 mg by mouth every 4 hours as needed for fever        losartan (COZAAR) 100 MG tablet Take 1 tablet (100 mg) by mouth daily (Patient taking differently: Take 100 mg by mouth At Bedtime ) 90 tablet 3     OXcarbazepine (TRILEPTAL) 300 MG tablet Take 2 tabs in the morning and 1 tab at night. 270 tablet 0     simvastatin (ZOCOR) 40 MG tablet Take 1 tablet (40 mg) by mouth At Bedtime 90 tablet 3     tiZANidine (ZANAFLEX) 4 MG capsule Take 1 capsule (4 mg) by mouth 3 times daily 90 capsule 2       Allergies  Allergies   Allergen Reactions     Nkda [No Known Drug Allergies]        Social History  Social History     Socioeconomic History     Marital status: Single     Spouse name: Not on file     Number of children: Not on file     Years of education: Not on file     Highest education level: Not on file   Occupational History     Not on file   Tobacco Use     Smoking status: Current Every Day Smoker     Packs/day: 0.50     Years: 15.00     Pack years: 7.50     Types: Cigarettes     Smokeless tobacco: Former User     Types: Chew   Vaping Use     Vaping Use: Never used   Substance and Sexual Activity     Alcohol use: Yes     Comment: rare     Drug use: No     Sexual activity: Yes     Partners: Female   Other Topics Concern     Parent/sibling w/ CABG, MI or angioplasty before 65F 55M? No   Social History Narrative     Not on file     Social Determinants of Health     Financial Resource  Strain: Not on file   Food Insecurity: Not on file   Transportation Needs: Not on file   Physical Activity: Not on file   Stress: Not on file   Social Connections: Not on file   Intimate Partner Violence: Not on file   Housing Stability: Not on file       Family History  Family History   Problem Relation Age of Onset     Hypertension Father      Hypertension Brother      C.A.D. Maternal Grandmother         40's     C.A.D. Maternal Aunt         40's     C.A.D. Maternal Uncle         40's     Anesthesia Reaction No family hx of      Deep Vein Thrombosis (DVT) No family hx of        Review of Systems  The complete review of systems is negative other than noted in the HPI or here.   Anesthesia Evaluation   Pt has had prior anesthetic.     No history of anesthetic complications       ROS/MED HX  ENT/Pulmonary:     (+) KIRSTEN risk factors, hypertension, tobacco use, Current use,  (-) asthma   Neurologic: Comment: Trigeminal neuralgia    (+) migraines,     Cardiovascular:     (+) Dyslipidemia hypertension----- (-) taking anticoagulants/antiplatelets   METS/Exercise Tolerance: >4 METS    Hematologic:  - neg hematologic  ROS  (-) history of blood clots and history of blood transfusion   Musculoskeletal:  - neg musculoskeletal ROS     GI/Hepatic:    (-) GERD   Renal/Genitourinary:  - neg Renal ROS     Endo:  - neg endo ROS  (-) chronic steroid usage   Psychiatric/Substance Use:       Infectious Disease:  - neg infectious disease ROS     Malignancy:       Other:            Virtual visit -  No vitals were obtained    Physical Exam  Constitutional: Awake, alert, cooperative, no apparent distress, and appears stated age.  HENT: Normocephalic  Respiratory: non labored breathing   Neurologic: Awake, alert, oriented to name, place and time.   Neuropsychiatric: Calm, cooperative. Normal affect.      Prior Labs/Diagnostic Studies   All labs and imaging personally reviewed     Component      Latest Ref Rng & Units 12/9/2021 2/11/2022    WBC      4.0 - 11.0 10e3/uL 8.4    RBC Count      4.40 - 5.90 10e6/uL 4.91    Hemoglobin      13.3 - 17.7 g/dL 16.0    Hematocrit      40.0 - 53.0 % 46.5    MCV      78 - 100 fL 95    MCH      26.5 - 33.0 pg 32.6    MCHC      31.5 - 36.5 g/dL 34.4    RDW      10.0 - 15.0 % 13.0    Platelet Count      150 - 450 10e3/uL 247    % Neutrophils      % 60    % Lymphocytes      % 27    % Monocytes      % 7    % Eosinophils      % 6    % Basophils      % 1    Absolute Neutrophils      1.6 - 8.3 10e3/uL 5.0    Absolute Lymphocytes      0.8 - 5.3 10e3/uL 2.2    Absolute Monocytes      0.0 - 1.3 10e3/uL 0.6    Absolute Eosinophils      0.0 - 0.7 10e3/uL 0.5    Absolute Basophils      0.0 - 0.2 10e3/uL 0.1    Sodium      133 - 144 mmol/L  139   Potassium      3.4 - 5.3 mmol/L  3.9   Chloride      94 - 109 mmol/L  107   Carbon Dioxide      20 - 32 mmol/L  28   Anion Gap      3 - 14 mmol/L  4   Urea Nitrogen      7 - 30 mg/dL  14   Creatinine      0.66 - 1.25 mg/dL  1.14   Calcium      8.5 - 10.1 mg/dL  9.1   Glucose      70 - 99 mg/dL  79   GFR Estimate      >60 mL/min/1.73m2  77       EKG/ stress test - if available please see in ROS above   No results found.  No flowsheet data found.      The patient's records and results personally reviewed by this provider.     Outside records reviewed from: NA      Assessment      Jossue Torres is a 51 year old male seen as a PAC referral for risk assessment and optimization for anesthesia.    Plan/Recommendations  Pt will be optimized for the proposed procedure.  See below for details on the assessment, risk, and preoperative recommendations    NEUROLOGY  - No history of TIA, CVA or seizure  -Post Op delirium risk factors:  No risk identified   -migraines using eletriptan PRN  -trigeminal neuralgia using Neurontin and trileptal. Above procedure planned     ENT  - No current airway concerns.  Will need to be reassessed day of surgery.  Mallampati: Unable to assess  TM: Unable to  "assess    CARDIAC  - Hypertension using hydrochlorothiazide and cozaar  - Hyperlipidemia using zocor  -denies cardiac history or symptoms   - METS (Metabolic Equivalents)  Patient performs 4 or more METS exercise without symptoms            Total Score: 0      RCRI-Very low risk: Class 1 0.4% complication rate            Total Score: 0        PULMONARY  KIRSTEN Medium Risk            Total Score: 4    KIRSTEN: Snores loudly    KIRSTEN: Hypertension    KIRSTEN: Over 50 ys old    KIRSTEN: Male      - Denies asthma or inhaler use  - Tobacco History      History   Smoking Status     Current Every Day Smoker     Packs/day: 0.50     Years: 15.00     Types: Cigarettes   Smokeless Tobacco     Former User     Types: Chew       GI  PONV Medium Risk  Total Score: 2           1 AN PONV: Patient is not a current smoker    1 AN PONV: Intended Post Op Opioids        /RENAL  - Baseline Creatinine  WNL    ENDOCRINE    - BMI: Estimated body mass index is 29.43 kg/m  as calculated from the following:    Height as of 2/11/22: 1.803 m (5' 11\").    Weight as of 2/11/22: 95.7 kg (211 lb).  Overweight (BMI 25.0-29.9)  - No history of Diabetes Mellitus    HEME  VTE Low Risk 0.5%            Total Score: 2    VTE: Male        The patient is optimized for their procedure. AVS with information on surgery time/arrival time, meds and NPO status given by nursing staff. No further diagnostic testing indicated.    Please refer to the physical examination documented by the anesthesiologist in the anesthesia record on the day of surgery.    Video-Visit Details    Type of service:  Video Visit    Patient verbally consented to video service today: YES    Video Start Time: 1737  Video End Time (time video stopped): 1747    Originating Location (pt. Location): Home    Distant Location (provider location):  [facility] PREOPERATIVE ASSESSMENT [facility]     Mode of Communication:  Video Conference via [email or web address]  On the day of service:     Prep time: 14 minutes  Visit time: 10 " minutes  Documentation time: 7 minutes  ------------------------------------------  Total time: 31 minutes      Tara Unger PA-C  Preoperative Assessment Center  [facility]  Clinic and Surgery Center  Phone: [phone]  Fax: [phone]    Physical Exam    Airway        Mallampati: II   TM distance: > 3 FB   Neck ROM: full   Mouth opening: > 3 cm    Respiratory Devices and Support         Dental  no notable dental history         Cardiovascular          Rhythm and rate: regular and normal     Pulmonary           breath sounds clear to auscultation             Anesthesia Plan    ASA Status:  2   NPO Status:  NPO Appropriate    Anesthesia Type: General.     - Airway: ETT   Induction: Intravenous.   Maintenance: Balanced.   Techniques and Equipment:     - Lines/Monitors: 2nd IV, Arterial Line     Consents    Anesthesia Plan(s) and associated risks, benefits, and realistic alternatives discussed. Questions answered and patient/representative(s) expressed understanding.    - Discussed:     - Discussed with:  Patient      - Extended Intubation/Ventilatory Support Discussed: No.      - Patient is DNR/DNI Status: No    Use of blood products discussed: Yes.     - Discussed with: Patient.     - Consented: consented to blood products            Reason for refusal: other.     Postoperative Care    Pain management: IV analgesics, Oral pain medications, Multi-modal analgesia.   PONV prophylaxis: Ondansetron (or other 5HT-3), Dexamethasone or Solumedrol     Comments:

## 2022-04-07 ENCOUNTER — HOSPITAL ENCOUNTER (INPATIENT)
Facility: CLINIC | Age: 52
LOS: 2 days | Discharge: HOME | End: 2022-04-09
Attending: NEUROLOGICAL SURGERY | Admitting: NEUROLOGICAL SURGERY
Payer: COMMERCIAL

## 2022-04-07 ENCOUNTER — ANESTHESIA (OUTPATIENT)
Dept: SURGERY | Facility: CLINIC | Age: 52
End: 2022-04-07
Payer: COMMERCIAL

## 2022-04-07 DIAGNOSIS — G50.0 TRIGEMINAL NEURALGIA: ICD-10-CM

## 2022-04-07 DIAGNOSIS — Z98.890 STATUS POST CRANIOTOMY: Primary | ICD-10-CM

## 2022-04-07 LAB
CREAT SERPL-MCNC: 1.16 MG/DL (ref 0.66–1.25)
GFR SERPL CREATININE-BSD FRML MDRD: 76 ML/MIN/1.73M2
GLUCOSE BLDC GLUCOMTR-MCNC: 99 MG/DL (ref 70–99)
MAGNESIUM SERPL-MCNC: 1.7 MG/DL (ref 1.6–2.3)
PHOSPHATE SERPL-MCNC: 2.4 MG/DL (ref 2.5–4.5)
POTASSIUM BLD-SCNC: 3.8 MMOL/L (ref 3.4–5.3)

## 2022-04-07 PROCEDURE — 250N000011 HC RX IP 250 OP 636

## 2022-04-07 PROCEDURE — 710N000010 HC RECOVERY PHASE 1, LEVEL 2, PER MIN: Performed by: NEUROLOGICAL SURGERY

## 2022-04-07 PROCEDURE — 250N000009 HC RX 250: Performed by: NEUROLOGICAL SURGERY

## 2022-04-07 PROCEDURE — 84132 ASSAY OF SERUM POTASSIUM: CPT

## 2022-04-07 PROCEDURE — 999N000157 HC STATISTIC RCP TIME EA 10 MIN

## 2022-04-07 PROCEDURE — 999N000141 HC STATISTIC PRE-PROCEDURE NURSING ASSESSMENT: Performed by: NEUROLOGICAL SURGERY

## 2022-04-07 PROCEDURE — 84100 ASSAY OF PHOSPHORUS: CPT

## 2022-04-07 PROCEDURE — C1781 MESH (IMPLANTABLE): HCPCS | Performed by: NEUROLOGICAL SURGERY

## 2022-04-07 PROCEDURE — C1763 CONN TISS, NON-HUMAN: HCPCS | Performed by: NEUROLOGICAL SURGERY

## 2022-04-07 PROCEDURE — 200N000002 HC R&B ICU UMMC

## 2022-04-07 PROCEDURE — 82565 ASSAY OF CREATININE: CPT | Performed by: NEUROLOGICAL SURGERY

## 2022-04-07 PROCEDURE — 250N000011 HC RX IP 250 OP 636: Performed by: STUDENT IN AN ORGANIZED HEALTH CARE EDUCATION/TRAINING PROGRAM

## 2022-04-07 PROCEDURE — 250N000009 HC RX 250: Performed by: STUDENT IN AN ORGANIZED HEALTH CARE EDUCATION/TRAINING PROGRAM

## 2022-04-07 PROCEDURE — 00NK0ZZ RELEASE TRIGEMINAL NERVE, OPEN APPROACH: ICD-10-PCS | Performed by: NEUROLOGICAL SURGERY

## 2022-04-07 PROCEDURE — 83735 ASSAY OF MAGNESIUM: CPT

## 2022-04-07 PROCEDURE — 272N000001 HC OR GENERAL SUPPLY STERILE: Performed by: NEUROLOGICAL SURGERY

## 2022-04-07 PROCEDURE — C1713 ANCHOR/SCREW BN/BN,TIS/BN: HCPCS | Performed by: NEUROLOGICAL SURGERY

## 2022-04-07 PROCEDURE — 61458 CRNEC SOPL XPL/DCMPR CRL NRV: CPT | Mod: GC | Performed by: NEUROLOGICAL SURGERY

## 2022-04-07 PROCEDURE — 258N000003 HC RX IP 258 OP 636

## 2022-04-07 PROCEDURE — 250N000025 HC SEVOFLURANE, PER MIN: Performed by: NEUROLOGICAL SURGERY

## 2022-04-07 PROCEDURE — 999N000015 HC STATISTIC ARTERIAL MONITORING DAILY

## 2022-04-07 PROCEDURE — 360N000079 HC SURGERY LEVEL 6, PER MIN: Performed by: NEUROLOGICAL SURGERY

## 2022-04-07 PROCEDURE — 250N000013 HC RX MED GY IP 250 OP 250 PS 637

## 2022-04-07 PROCEDURE — 258N000003 HC RX IP 258 OP 636: Performed by: STUDENT IN AN ORGANIZED HEALTH CARE EDUCATION/TRAINING PROGRAM

## 2022-04-07 PROCEDURE — C1762 CONN TISS, HUMAN(INC FASCIA): HCPCS | Performed by: NEUROLOGICAL SURGERY

## 2022-04-07 PROCEDURE — 272N000004 HC RX 272: Performed by: NEUROLOGICAL SURGERY

## 2022-04-07 PROCEDURE — 250N000013 HC RX MED GY IP 250 OP 250 PS 637: Performed by: STUDENT IN AN ORGANIZED HEALTH CARE EDUCATION/TRAINING PROGRAM

## 2022-04-07 PROCEDURE — 370N000017 HC ANESTHESIA TECHNICAL FEE, PER MIN: Performed by: NEUROLOGICAL SURGERY

## 2022-04-07 DEVICE — IMPLANTABLE DEVICE: Type: IMPLANTABLE DEVICE | Site: CRANIAL | Status: FUNCTIONAL

## 2022-04-07 DEVICE — GRAFT PTFE FELT 6X6" 007837: Type: IMPLANTABLE DEVICE | Site: CRANIAL | Status: FUNCTIONAL

## 2022-04-07 DEVICE — IMP SCR SYN MATRIX LOW PRO 1.5X04MM SELF DRILL 04.503.104.01
Type: IMPLANTABLE DEVICE | Site: CRANIAL | Status: FUNCTIONAL
Removed: 2022-05-06

## 2022-04-07 DEVICE — IMP MESH SYN MALLEABLE 38X45MM LOW PROFILER TI
Type: IMPLANTABLE DEVICE | Site: CRANIAL | Status: NON-FUNCTIONAL
Removed: 2022-05-06

## 2022-04-07 RX ORDER — HYDRALAZINE HYDROCHLORIDE 20 MG/ML
10-20 INJECTION INTRAMUSCULAR; INTRAVENOUS EVERY 30 MIN PRN
Status: DISCONTINUED | OUTPATIENT
Start: 2022-04-07 | End: 2022-04-09 | Stop reason: HOSPADM

## 2022-04-07 RX ORDER — OXCARBAZEPINE 600 MG/1
600 TABLET, FILM COATED ORAL EVERY MORNING
Status: DISCONTINUED | OUTPATIENT
Start: 2022-04-08 | End: 2022-04-09 | Stop reason: HOSPADM

## 2022-04-07 RX ORDER — NALOXONE HYDROCHLORIDE 0.4 MG/ML
0.4 INJECTION, SOLUTION INTRAMUSCULAR; INTRAVENOUS; SUBCUTANEOUS
Status: DISCONTINUED | OUTPATIENT
Start: 2022-04-07 | End: 2022-04-09 | Stop reason: HOSPADM

## 2022-04-07 RX ORDER — CAFFEINE 200 MG
200 TABLET ORAL DAILY PRN
Status: DISCONTINUED | OUTPATIENT
Start: 2022-04-07 | End: 2022-04-09 | Stop reason: HOSPADM

## 2022-04-07 RX ORDER — ONDANSETRON 4 MG/1
4 TABLET, ORALLY DISINTEGRATING ORAL EVERY 30 MIN PRN
Status: DISCONTINUED | OUTPATIENT
Start: 2022-04-07 | End: 2022-04-07 | Stop reason: HOSPADM

## 2022-04-07 RX ORDER — GABAPENTIN 600 MG/1
600 TABLET ORAL 3 TIMES DAILY
Qty: 90 TABLET | Refills: 0 | Status: SHIPPED | OUTPATIENT
Start: 2022-04-07 | End: 2022-05-11

## 2022-04-07 RX ORDER — SIMVASTATIN 40 MG
40 TABLET ORAL AT BEDTIME
Status: DISCONTINUED | OUTPATIENT
Start: 2022-04-07 | End: 2022-04-09 | Stop reason: HOSPADM

## 2022-04-07 RX ORDER — HYDROCHLOROTHIAZIDE 25 MG/1
25 TABLET ORAL DAILY
Qty: 30 TABLET | Refills: 10 | Status: SHIPPED | OUTPATIENT
Start: 2022-04-07 | End: 2022-08-15

## 2022-04-07 RX ORDER — ONDANSETRON 2 MG/ML
INJECTION INTRAMUSCULAR; INTRAVENOUS PRN
Status: DISCONTINUED | OUTPATIENT
Start: 2022-04-07 | End: 2022-04-07

## 2022-04-07 RX ORDER — FENTANYL CITRATE 50 UG/ML
25 INJECTION, SOLUTION INTRAMUSCULAR; INTRAVENOUS EVERY 5 MIN PRN
Status: DISCONTINUED | OUTPATIENT
Start: 2022-04-07 | End: 2022-04-07 | Stop reason: HOSPADM

## 2022-04-07 RX ORDER — PROCHLORPERAZINE MALEATE 10 MG
10 TABLET ORAL EVERY 6 HOURS PRN
Status: DISCONTINUED | OUTPATIENT
Start: 2022-04-07 | End: 2022-04-09 | Stop reason: HOSPADM

## 2022-04-07 RX ORDER — SODIUM CHLORIDE, SODIUM LACTATE, POTASSIUM CHLORIDE, CALCIUM CHLORIDE 600; 310; 30; 20 MG/100ML; MG/100ML; MG/100ML; MG/100ML
INJECTION, SOLUTION INTRAVENOUS CONTINUOUS PRN
Status: DISCONTINUED | OUTPATIENT
Start: 2022-04-07 | End: 2022-04-07

## 2022-04-07 RX ORDER — OXYCODONE HYDROCHLORIDE 5 MG/1
5 TABLET ORAL EVERY 4 HOURS PRN
Status: DISCONTINUED | OUTPATIENT
Start: 2022-04-07 | End: 2022-04-09 | Stop reason: HOSPADM

## 2022-04-07 RX ORDER — LIDOCAINE 40 MG/G
CREAM TOPICAL
Status: DISCONTINUED | OUTPATIENT
Start: 2022-04-07 | End: 2022-04-09 | Stop reason: HOSPADM

## 2022-04-07 RX ORDER — HALOPERIDOL 5 MG/ML
1 INJECTION INTRAMUSCULAR
Status: DISCONTINUED | OUTPATIENT
Start: 2022-04-07 | End: 2022-04-07 | Stop reason: HOSPADM

## 2022-04-07 RX ORDER — AMOXICILLIN 250 MG
1 CAPSULE ORAL 2 TIMES DAILY
Status: DISCONTINUED | OUTPATIENT
Start: 2022-04-07 | End: 2022-04-09 | Stop reason: HOSPADM

## 2022-04-07 RX ORDER — NALOXONE HYDROCHLORIDE 0.4 MG/ML
0.2 INJECTION, SOLUTION INTRAMUSCULAR; INTRAVENOUS; SUBCUTANEOUS
Status: DISCONTINUED | OUTPATIENT
Start: 2022-04-07 | End: 2022-04-09 | Stop reason: HOSPADM

## 2022-04-07 RX ORDER — CEFAZOLIN SODIUM 1 G/3ML
INJECTION, POWDER, FOR SOLUTION INTRAMUSCULAR; INTRAVENOUS PRN
Status: DISCONTINUED | OUTPATIENT
Start: 2022-04-07 | End: 2022-04-07

## 2022-04-07 RX ORDER — MANNITOL 20 G/100ML
INJECTION, SOLUTION INTRAVENOUS PRN
Status: DISCONTINUED | OUTPATIENT
Start: 2022-04-07 | End: 2022-04-07

## 2022-04-07 RX ORDER — SODIUM CHLORIDE, SODIUM LACTATE, POTASSIUM CHLORIDE, CALCIUM CHLORIDE 600; 310; 30; 20 MG/100ML; MG/100ML; MG/100ML; MG/100ML
INJECTION, SOLUTION INTRAVENOUS CONTINUOUS
Status: DISCONTINUED | OUTPATIENT
Start: 2022-04-07 | End: 2022-04-07 | Stop reason: HOSPADM

## 2022-04-07 RX ORDER — ONDANSETRON 2 MG/ML
4 INJECTION INTRAMUSCULAR; INTRAVENOUS EVERY 6 HOURS PRN
Status: DISCONTINUED | OUTPATIENT
Start: 2022-04-07 | End: 2022-04-09 | Stop reason: HOSPADM

## 2022-04-07 RX ORDER — OXYCODONE HYDROCHLORIDE 10 MG/1
10 TABLET ORAL EVERY 4 HOURS PRN
Status: DISCONTINUED | OUTPATIENT
Start: 2022-04-07 | End: 2022-04-09 | Stop reason: HOSPADM

## 2022-04-07 RX ORDER — SODIUM CHLORIDE 9 MG/ML
INJECTION, SOLUTION INTRAVENOUS CONTINUOUS PRN
Status: DISCONTINUED | OUTPATIENT
Start: 2022-04-07 | End: 2022-04-07

## 2022-04-07 RX ORDER — FENTANYL CITRATE 50 UG/ML
INJECTION, SOLUTION INTRAMUSCULAR; INTRAVENOUS PRN
Status: DISCONTINUED | OUTPATIENT
Start: 2022-04-07 | End: 2022-04-07

## 2022-04-07 RX ORDER — SODIUM CHLORIDE 9 MG/ML
INJECTION, SOLUTION INTRAVENOUS CONTINUOUS
Status: ACTIVE | OUTPATIENT
Start: 2022-04-07 | End: 2022-04-08

## 2022-04-07 RX ORDER — ONDANSETRON 4 MG/1
4 TABLET, ORALLY DISINTEGRATING ORAL EVERY 6 HOURS PRN
Status: DISCONTINUED | OUTPATIENT
Start: 2022-04-07 | End: 2022-04-09 | Stop reason: HOSPADM

## 2022-04-07 RX ORDER — PROPOFOL 10 MG/ML
INJECTION, EMULSION INTRAVENOUS CONTINUOUS PRN
Status: DISCONTINUED | OUTPATIENT
Start: 2022-04-07 | End: 2022-04-07

## 2022-04-07 RX ORDER — BISACODYL 10 MG
10 SUPPOSITORY, RECTAL RECTAL DAILY PRN
Status: DISCONTINUED | OUTPATIENT
Start: 2022-04-07 | End: 2022-04-09 | Stop reason: HOSPADM

## 2022-04-07 RX ORDER — LIDOCAINE 40 MG/G
CREAM TOPICAL
Status: DISCONTINUED | OUTPATIENT
Start: 2022-04-07 | End: 2022-04-07 | Stop reason: HOSPADM

## 2022-04-07 RX ORDER — OXYCODONE HYDROCHLORIDE 5 MG/1
5 TABLET ORAL EVERY 4 HOURS PRN
Status: DISCONTINUED | OUTPATIENT
Start: 2022-04-07 | End: 2022-04-07 | Stop reason: HOSPADM

## 2022-04-07 RX ORDER — PHENYLEPHRINE HCL IN 0.9% NACL 50MG/250ML
.5-1.25 PLASTIC BAG, INJECTION (ML) INTRAVENOUS CONTINUOUS
Status: DISCONTINUED | OUTPATIENT
Start: 2022-04-07 | End: 2022-04-07

## 2022-04-07 RX ORDER — ACETAMINOPHEN 325 MG/1
650 TABLET ORAL EVERY 4 HOURS PRN
Status: DISCONTINUED | OUTPATIENT
Start: 2022-04-10 | End: 2022-04-09 | Stop reason: HOSPADM

## 2022-04-07 RX ORDER — LIDOCAINE HYDROCHLORIDE 20 MG/ML
INJECTION, SOLUTION INFILTRATION; PERINEURAL PRN
Status: DISCONTINUED | OUTPATIENT
Start: 2022-04-07 | End: 2022-04-07

## 2022-04-07 RX ORDER — ACETAMINOPHEN 325 MG/1
975 TABLET ORAL ONCE
Status: DISCONTINUED | OUTPATIENT
Start: 2022-04-07 | End: 2022-04-07 | Stop reason: HOSPADM

## 2022-04-07 RX ORDER — PROPOFOL 10 MG/ML
INJECTION, EMULSION INTRAVENOUS PRN
Status: DISCONTINUED | OUTPATIENT
Start: 2022-04-07 | End: 2022-04-07

## 2022-04-07 RX ORDER — LABETALOL HYDROCHLORIDE 5 MG/ML
10 INJECTION, SOLUTION INTRAVENOUS
Status: DISCONTINUED | OUTPATIENT
Start: 2022-04-07 | End: 2022-04-07 | Stop reason: HOSPADM

## 2022-04-07 RX ORDER — GABAPENTIN 600 MG/1
600 TABLET ORAL 3 TIMES DAILY
Status: DISCONTINUED | OUTPATIENT
Start: 2022-04-07 | End: 2022-04-09 | Stop reason: HOSPADM

## 2022-04-07 RX ORDER — HYDROMORPHONE HCL IN WATER/PF 6 MG/30 ML
0.2 PATIENT CONTROLLED ANALGESIA SYRINGE INTRAVENOUS EVERY 5 MIN PRN
Status: DISCONTINUED | OUTPATIENT
Start: 2022-04-07 | End: 2022-04-07 | Stop reason: HOSPADM

## 2022-04-07 RX ORDER — ACETAMINOPHEN 325 MG/1
975 TABLET ORAL EVERY 8 HOURS
Status: DISCONTINUED | OUTPATIENT
Start: 2022-04-07 | End: 2022-04-09 | Stop reason: HOSPADM

## 2022-04-07 RX ORDER — OXCARBAZEPINE 300 MG/1
TABLET, FILM COATED ORAL
Qty: 270 TABLET | Refills: 0 | Status: SHIPPED | OUTPATIENT
Start: 2022-04-07 | End: 2022-05-11

## 2022-04-07 RX ORDER — BUPIVACAINE HYDROCHLORIDE AND EPINEPHRINE 2.5; 5 MG/ML; UG/ML
INJECTION, SOLUTION EPIDURAL; INFILTRATION; INTRACAUDAL; PERINEURAL PRN
Status: DISCONTINUED | OUTPATIENT
Start: 2022-04-07 | End: 2022-04-07 | Stop reason: HOSPADM

## 2022-04-07 RX ORDER — POLYETHYLENE GLYCOL 3350 17 G/17G
17 POWDER, FOR SOLUTION ORAL 2 TIMES DAILY
Status: DISCONTINUED | OUTPATIENT
Start: 2022-04-08 | End: 2022-04-09 | Stop reason: HOSPADM

## 2022-04-07 RX ORDER — ONDANSETRON 2 MG/ML
4 INJECTION INTRAMUSCULAR; INTRAVENOUS EVERY 30 MIN PRN
Status: DISCONTINUED | OUTPATIENT
Start: 2022-04-07 | End: 2022-04-07 | Stop reason: HOSPADM

## 2022-04-07 RX ORDER — LABETALOL HYDROCHLORIDE 5 MG/ML
10-40 INJECTION, SOLUTION INTRAVENOUS EVERY 10 MIN PRN
Status: DISCONTINUED | OUTPATIENT
Start: 2022-04-07 | End: 2022-04-09 | Stop reason: HOSPADM

## 2022-04-07 RX ORDER — DEXAMETHASONE SODIUM PHOSPHATE 4 MG/ML
INJECTION, SOLUTION INTRA-ARTICULAR; INTRALESIONAL; INTRAMUSCULAR; INTRAVENOUS; SOFT TISSUE PRN
Status: DISCONTINUED | OUTPATIENT
Start: 2022-04-07 | End: 2022-04-07

## 2022-04-07 RX ORDER — OXCARBAZEPINE 300 MG/1
300 TABLET, FILM COATED ORAL AT BEDTIME
Status: DISCONTINUED | OUTPATIENT
Start: 2022-04-07 | End: 2022-04-09 | Stop reason: HOSPADM

## 2022-04-07 RX ADMIN — SODIUM CHLORIDE: 9 INJECTION, SOLUTION INTRAVENOUS at 10:03

## 2022-04-07 RX ADMIN — CEFAZOLIN 1 G: 1 INJECTION, POWDER, FOR SOLUTION INTRAMUSCULAR; INTRAVENOUS at 12:56

## 2022-04-07 RX ADMIN — MANNITOL 70 G: 20 INJECTION, SOLUTION INTRAVENOUS at 09:26

## 2022-04-07 RX ADMIN — ROCURONIUM BROMIDE 20 MG: 50 INJECTION, SOLUTION INTRAVENOUS at 08:39

## 2022-04-07 RX ADMIN — FENTANYL CITRATE 25 MCG: 50 INJECTION, SOLUTION INTRAMUSCULAR; INTRAVENOUS at 15:30

## 2022-04-07 RX ADMIN — SIMVASTATIN 40 MG: 40 TABLET, FILM COATED ORAL at 21:32

## 2022-04-07 RX ADMIN — ROCURONIUM BROMIDE 5 MG: 50 INJECTION, SOLUTION INTRAVENOUS at 12:52

## 2022-04-07 RX ADMIN — SENNOSIDES AND DOCUSATE SODIUM 1 TABLET: 8.6; 5 TABLET ORAL at 19:54

## 2022-04-07 RX ADMIN — ROCURONIUM BROMIDE 50 MG: 50 INJECTION, SOLUTION INTRAVENOUS at 08:11

## 2022-04-07 RX ADMIN — PROCHLORPERAZINE EDISYLATE 10 MG: 5 INJECTION INTRAMUSCULAR; INTRAVENOUS at 16:16

## 2022-04-07 RX ADMIN — FENTANYL CITRATE 75 MCG: 50 INJECTION, SOLUTION INTRAMUSCULAR; INTRAVENOUS at 11:44

## 2022-04-07 RX ADMIN — TIZANIDINE 4 MG: 4 TABLET ORAL at 18:05

## 2022-04-07 RX ADMIN — ROCURONIUM BROMIDE 5 MG: 50 INJECTION, SOLUTION INTRAVENOUS at 13:50

## 2022-04-07 RX ADMIN — PHENYLEPHRINE HYDROCHLORIDE 100 MCG: 10 INJECTION INTRAVENOUS at 09:51

## 2022-04-07 RX ADMIN — SODIUM CHLORIDE, SODIUM LACTATE, POTASSIUM CHLORIDE, CALCIUM CHLORIDE: 600; 310; 30; 20 INJECTION, SOLUTION INTRAVENOUS at 09:36

## 2022-04-07 RX ADMIN — Medication 0.2 MCG/KG/MIN: at 09:57

## 2022-04-07 RX ADMIN — GABAPENTIN 600 MG: 600 TABLET, FILM COATED ORAL at 19:54

## 2022-04-07 RX ADMIN — FENTANYL CITRATE 50 MCG: 50 INJECTION, SOLUTION INTRAMUSCULAR; INTRAVENOUS at 13:36

## 2022-04-07 RX ADMIN — PROPOFOL 20 MG: 10 INJECTION, EMULSION INTRAVENOUS at 08:42

## 2022-04-07 RX ADMIN — CAFFEINE 200 MG: 200 TABLET ORAL at 18:04

## 2022-04-07 RX ADMIN — LABETALOL HYDROCHLORIDE 10 MG: 5 INJECTION, SOLUTION INTRAVENOUS at 18:03

## 2022-04-07 RX ADMIN — FENTANYL CITRATE 25 MCG: 50 INJECTION, SOLUTION INTRAMUSCULAR; INTRAVENOUS at 15:26

## 2022-04-07 RX ADMIN — ROCURONIUM BROMIDE 10 MG: 50 INJECTION, SOLUTION INTRAVENOUS at 09:40

## 2022-04-07 RX ADMIN — FENTANYL CITRATE 100 MCG: 50 INJECTION, SOLUTION INTRAMUSCULAR; INTRAVENOUS at 08:11

## 2022-04-07 RX ADMIN — FENTANYL CITRATE 150 MCG: 50 INJECTION, SOLUTION INTRAMUSCULAR; INTRAVENOUS at 08:42

## 2022-04-07 RX ADMIN — PHENYLEPHRINE HYDROCHLORIDE 100 MCG: 10 INJECTION INTRAVENOUS at 09:59

## 2022-04-07 RX ADMIN — DEXAMETHASONE SODIUM PHOSPHATE 10 MG: 4 INJECTION, SOLUTION INTRA-ARTICULAR; INTRALESIONAL; INTRAMUSCULAR; INTRAVENOUS; SOFT TISSUE at 08:11

## 2022-04-07 RX ADMIN — PROPOFOL 150 MG: 10 INJECTION, EMULSION INTRAVENOUS at 08:11

## 2022-04-07 RX ADMIN — LIDOCAINE HYDROCHLORIDE 100 MG: 20 INJECTION, SOLUTION INFILTRATION; PERINEURAL at 08:11

## 2022-04-07 RX ADMIN — CEFAZOLIN 2 G: 1 INJECTION, POWDER, FOR SOLUTION INTRAMUSCULAR; INTRAVENOUS at 08:57

## 2022-04-07 RX ADMIN — FENTANYL CITRATE 25 MCG: 50 INJECTION, SOLUTION INTRAMUSCULAR; INTRAVENOUS at 12:12

## 2022-04-07 RX ADMIN — MIDAZOLAM 2 MG: 1 INJECTION INTRAMUSCULAR; INTRAVENOUS at 07:56

## 2022-04-07 RX ADMIN — ROCURONIUM BROMIDE 30 MG: 50 INJECTION, SOLUTION INTRAVENOUS at 11:24

## 2022-04-07 RX ADMIN — SODIUM CHLORIDE, POTASSIUM CHLORIDE, SODIUM LACTATE AND CALCIUM CHLORIDE: 600; 310; 30; 20 INJECTION, SOLUTION INTRAVENOUS at 07:56

## 2022-04-07 RX ADMIN — ROCURONIUM BROMIDE 20 MG: 50 INJECTION, SOLUTION INTRAVENOUS at 10:36

## 2022-04-07 RX ADMIN — PROPOFOL 50 MCG/KG/MIN: 10 INJECTION, EMULSION INTRAVENOUS at 09:36

## 2022-04-07 RX ADMIN — SUGAMMADEX 200 MG: 100 INJECTION, SOLUTION INTRAVENOUS at 14:12

## 2022-04-07 RX ADMIN — PROPOFOL 30 MG: 10 INJECTION, EMULSION INTRAVENOUS at 08:13

## 2022-04-07 RX ADMIN — SODIUM CHLORIDE: 9 INJECTION, SOLUTION INTRAVENOUS at 23:04

## 2022-04-07 RX ADMIN — FENTANYL CITRATE 25 MCG: 50 INJECTION, SOLUTION INTRAMUSCULAR; INTRAVENOUS at 15:14

## 2022-04-07 RX ADMIN — FENTANYL CITRATE 25 MCG: 50 INJECTION, SOLUTION INTRAMUSCULAR; INTRAVENOUS at 15:22

## 2022-04-07 RX ADMIN — GABAPENTIN 600 MG: 600 TABLET, FILM COATED ORAL at 16:18

## 2022-04-07 RX ADMIN — ACETAMINOPHEN 975 MG: 325 TABLET ORAL at 23:03

## 2022-04-07 RX ADMIN — FENTANYL CITRATE 50 MCG: 50 INJECTION, SOLUTION INTRAMUSCULAR; INTRAVENOUS at 13:17

## 2022-04-07 RX ADMIN — ONDANSETRON 4 MG: 2 INJECTION INTRAMUSCULAR; INTRAVENOUS at 13:48

## 2022-04-07 RX ADMIN — ACETAMINOPHEN 975 MG: 325 TABLET ORAL at 16:17

## 2022-04-07 RX ADMIN — ROCURONIUM BROMIDE 5 MG: 50 INJECTION, SOLUTION INTRAVENOUS at 13:29

## 2022-04-07 RX ADMIN — OXYCODONE HYDROCHLORIDE 10 MG: 10 TABLET ORAL at 16:18

## 2022-04-07 RX ADMIN — OXCARBAZEPINE 300 MG: 300 TABLET, FILM COATED ORAL at 21:31

## 2022-04-07 RX ADMIN — ROCURONIUM BROMIDE 20 MG: 50 INJECTION, SOLUTION INTRAVENOUS at 09:24

## 2022-04-07 ASSESSMENT — ACTIVITIES OF DAILY LIVING (ADL)
ADLS_ACUITY_SCORE: 3
ADLS_ACUITY_SCORE: 5
ADLS_ACUITY_SCORE: 5
ADLS_ACUITY_SCORE: 3
ADLS_ACUITY_SCORE: 5
ADLS_ACUITY_SCORE: 5
ADLS_ACUITY_SCORE: 3
ADLS_ACUITY_SCORE: 5

## 2022-04-07 NOTE — TELEPHONE ENCOUNTER
Routing refill request to provider for review/approval because:  Drug not on the [facility] refill protocol     Angélica Martinez RN BSN PHN

## 2022-04-07 NOTE — ANESTHESIA PROCEDURE NOTES
Arterial Line Procedure Note    Pre-Procedure   Staff -        Anesthesiologist:  Hemalatha Tamayo MD       Resident/Fellow: Jansuz Dickens MD       Performed By: resident       Location: OR       Pre-Anesthestic Checklist: patient identified, IV checked, risks and benefits discussed, informed consent, monitors and equipment checked, pre-op evaluation and at physician/surgeon's request  Timeout:       Correct Patient: Yes        Correct Procedure: Yes        Correct Site: Yes        Correct Position: Yes   Line Placement:   This line was placed Post Induction  Procedure   Procedure: arterial line and new line       Laterality: left       Insertion Site: radial.  Sterile Prep        Standard elements of sterile barrier followed       Skin prep: Chloraprep  Insertion/Injection        Technique: Seldinger Technique        Catheter Type/Size: 20 G, 1.75 in/4.5 cm quick cath (integral wire)  Narrative         Secured by: suture       Tegaderm dressing used.       Complications: None apparent,        Arterial waveform: Yes        IBP within 10% of NIBP: Yes
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
## 2022-04-07 NOTE — PLAN OF CARE
Admitted/transferred from: PACU s/p R microvascular decompression  Reason for admission/transfer: frequent Neuro checks, vital monitoring  Patient status upon admission/transfer: stable, neuro intact, 8/10 migraine pain  Interventions: PRN medications given, MD notified of arrival and migraine, caffeine tab ordered and given  Plan: Continue to monitor, treat pain, notify MD with any changes in patient condition   Height, weight, drug calc weight: done  Patient belongings: Bag in room with patient  MDRO education (if applicable): NA

## 2022-04-07 NOTE — ANESTHESIA CARE TRANSFER NOTE
Patient: Jossue Torres    Procedure: Procedure(s):  Right microvascular decompression  Latex Free       Diagnosis: Trigeminal neuralgia [G50.0]  Diagnosis Additional Information: No value filed.    Anesthesia Type:   General     Note:    Oropharynx: oropharynx clear of all foreign objects  Level of Consciousness: drowsy  Oxygen Supplementation: nasal cannula  Level of Supplemental Oxygen (L/min / FiO2): 4  Independent Airway: airway patency satisfactory and stable  Dentition: dentition unchanged  Vital Signs Stable: post-procedure vital signs reviewed and stable  Report to RN Given: handoff report given  Patient transferred to: PACU    Handoff Report: Identifed the Patient, Identified the Reponsible Provider, Reviewed the pertinent medical history, Discussed the surgical course, Reviewed Intra-OP anesthesia mangement and issues during anesthesia, Set expectations for post-procedure period and Allowed opportunity for questions and acknowledgement of understanding      Vitals:  Vitals Value Taken Time   /79 04/07/22 1423   Temp     Pulse 87 04/07/22 1426   Resp 14 04/07/22 1426   SpO2 93 % 04/07/22 1426   Vitals shown include unvalidated device data.    Electronically Signed By: Janusz Dickens MD  April 7, 2022  2:27 PM

## 2022-04-07 NOTE — BRIEF OP NOTE
"[facility]    Brief Operative Note    Pre-operative diagnosis: Trigeminal neuralgia [G50.0]  Post-operative diagnosis Same as pre-operative diagnosis    Procedure: Procedure(s):  Right microvascular decompression  Latex Free  Surgeon: Surgeon(s) and Role:     * Grey Simeon MD - Primary     * Mark Hutton MD - Resident - Assisting     * Maria Esther Katz MD - Fellow - Assisting  Anesthesia: General   Estimated Blood Loss: None    Drains: None  Specimens: * No specimens in log *  Findings:   None.  Complications: None.  Implants:   Implant Name Type Inv. Item Serial No.  Lot No. LRB No. Used Action   DuraMatrix Suturable Collagen Dura [facility]    [employer] [id number] Right 1 Implanted   GRAFT PTFE FELT 6X6\" [id number] - [id number] Graft GRAFT PTFE FELT 6X6\" [id number]  [employer] [id number] Right 1 Implanted   IMP MESH SYN MALLEABLE [id number] LOW  TI - [id number] Mesh IMP MESH SYN MALLEABLE [id number] LOW  TI  SYNTHES-STRATEC N/A Right 1 Implanted   IMP SCR SYN MATRIX LOW PRO 1.5X04MM SELF DRILL [id number] - [id number] Metallic Hardware/[place] IMP SCR SYN MATRIX LOW PRO 1.5X04MM SELF DRILL [id number]  SYNTHES-STRATEC N/A Right 17 Implanted           "

## 2022-04-07 NOTE — ANESTHESIA POSTPROCEDURE EVALUATION
Patient: Jossue Torres    Procedure: Procedure(s):  Right microvascular decompression  Latex Free       Anesthesia Type:  General    Note:  Disposition: Inpatient; ICU   Postop Pain Control: Uneventful            Sign Out: Well controlled pain   PONV: No   Neuro/Psych: Uneventful            Sign Out: Acceptable/Baseline neuro status   Airway/Respiratory: Uneventful            Sign Out: Acceptable/Baseline resp. status   CV/Hemodynamics: Uneventful            Sign Out: Acceptable CV status; No obvious hypovolemia; No obvious fluid overload   Other NRE: NONE   DID A NON-ROUTINE EVENT OCCUR? No           Last vitals:  Vitals Value Taken Time   /79 04/07/22 1515   Temp 36.7  C (98.1  F) 04/07/22 1424   Pulse 98 04/07/22 1530   Resp 0 04/07/22 1530   SpO2 96 % 04/07/22 1530   Vitals shown include unvalidated device data.    Electronically Signed By: Mohamud Rogers MD  April 7, 2022  3:31 PM

## 2022-04-07 NOTE — ANESTHESIA PROCEDURE NOTES
Airway         Procedure Start/Stop Times: 4/7/2022 8:14 AM  Staff -        Anesthesiologist:  Hemalatha Tamayo MD       Resident/Fellow: Janusz Dickens MD       Performed By: resident  Consent for Airway        Urgency: emergent       Consent: The procedure was performed in an emergent situation.  Indications and Patient Condition       Indications for airway management: eric-procedural       Induction type:intravenous       Mask difficulty assessment: 1 - vent by mask    Final Airway Details       Final airway type: endotracheal airway       Successful airway: ETT - single  Endotracheal Airway Details        ETT size (mm): 7.5       Cuffed: yes       Successful intubation technique: direct laryngoscopy       DL Blade Type: MAC 4       Grade View of Cords: 2       Adjucts: stylet       Position: Right       Measured from: gums/teeth       Secured at (cm): 22       Bite block used: None    Post intubation assessment        Placement verified by: capnometry, equal breath sounds and chest rise        Number of attempts at approach: 1       Number of other approaches attempted: 0       Secured with: silk tape       Ease of procedure: easy       Dentition: Intact and Unchanged

## 2022-04-08 ENCOUNTER — APPOINTMENT (OUTPATIENT)
Dept: PHYSICAL THERAPY | Facility: CLINIC | Age: 52
End: 2022-04-08
Attending: NEUROLOGICAL SURGERY
Payer: COMMERCIAL

## 2022-04-08 LAB
ANION GAP SERPL CALCULATED.3IONS-SCNC: 6 MMOL/L (ref 3–14)
BUN SERPL-MCNC: 16 MG/DL (ref 7–30)
CALCIUM SERPL-MCNC: 8.3 MG/DL (ref 8.5–10.1)
CHLORIDE BLD-SCNC: 109 MMOL/L (ref 94–109)
CO2 SERPL-SCNC: 24 MMOL/L (ref 20–32)
CREAT SERPL-MCNC: 1.13 MG/DL (ref 0.66–1.25)
ERYTHROCYTE [DISTWIDTH] IN BLOOD BY AUTOMATED COUNT: 13.2 % (ref 10–15)
GFR SERPL CREATININE-BSD FRML MDRD: 78 ML/MIN/1.73M2
GLUCOSE BLD-MCNC: 124 MG/DL (ref 70–99)
HCT VFR BLD AUTO: 40.4 % (ref 40–53)
HGB BLD-MCNC: 13.8 G/DL (ref 13.3–17.7)
MAGNESIUM SERPL-MCNC: 2 MG/DL (ref 1.6–2.3)
MCH RBC QN AUTO: 33.2 PG (ref 26.5–33)
MCHC RBC AUTO-ENTMCNC: 34.2 G/DL (ref 31.5–36.5)
MCV RBC AUTO: 97 FL (ref 78–100)
PHOSPHATE SERPL-MCNC: 3.4 MG/DL (ref 2.5–4.5)
PLATELET # BLD AUTO: 229 10E3/UL (ref 150–450)
POTASSIUM BLD-SCNC: 3.5 MMOL/L (ref 3.4–5.3)
RBC # BLD AUTO: 4.16 10E6/UL (ref 4.4–5.9)
SODIUM SERPL-SCNC: 139 MMOL/L (ref 133–144)
WBC # BLD AUTO: 12 10E3/UL (ref 4–11)

## 2022-04-08 PROCEDURE — 250N000013 HC RX MED GY IP 250 OP 250 PS 637

## 2022-04-08 PROCEDURE — 80048 BASIC METABOLIC PNL TOTAL CA: CPT

## 2022-04-08 PROCEDURE — 97530 THERAPEUTIC ACTIVITIES: CPT | Mod: GP | Performed by: PHYSICAL THERAPIST

## 2022-04-08 PROCEDURE — 250N000013 HC RX MED GY IP 250 OP 250 PS 637: Performed by: NEUROLOGICAL SURGERY

## 2022-04-08 PROCEDURE — 83735 ASSAY OF MAGNESIUM: CPT

## 2022-04-08 PROCEDURE — 97116 GAIT TRAINING THERAPY: CPT | Mod: GP | Performed by: PHYSICAL THERAPIST

## 2022-04-08 PROCEDURE — 999N000111 HC STATISTIC OT IP EVAL DEFER

## 2022-04-08 PROCEDURE — 85014 HEMATOCRIT: CPT

## 2022-04-08 PROCEDURE — 97161 PT EVAL LOW COMPLEX 20 MIN: CPT | Mod: GP | Performed by: PHYSICAL THERAPIST

## 2022-04-08 PROCEDURE — 250N000013 HC RX MED GY IP 250 OP 250 PS 637: Performed by: STUDENT IN AN ORGANIZED HEALTH CARE EDUCATION/TRAINING PROGRAM

## 2022-04-08 PROCEDURE — 84100 ASSAY OF PHOSPHORUS: CPT

## 2022-04-08 PROCEDURE — 999N000015 HC STATISTIC ARTERIAL MONITORING DAILY

## 2022-04-08 PROCEDURE — 120N000002 HC R&B MED SURG/OB UMMC

## 2022-04-08 PROCEDURE — 999N000248 HC STATISTIC IV INSERT WITH US BY RN

## 2022-04-08 RX ORDER — ELETRIPTAN HYDROBROMIDE 40 MG/1
40 TABLET, FILM COATED ORAL
Status: COMPLETED | OUTPATIENT
Start: 2022-04-08 | End: 2022-04-08

## 2022-04-08 RX ORDER — POTASSIUM CHLORIDE 750 MG/1
20 TABLET, EXTENDED RELEASE ORAL ONCE
Status: COMPLETED | OUTPATIENT
Start: 2022-04-08 | End: 2022-04-08

## 2022-04-08 RX ORDER — ELETRIPTAN HYDROBROMIDE 40 MG/1
40 TABLET, FILM COATED ORAL
Status: DISCONTINUED | OUTPATIENT
Start: 2022-04-08 | End: 2022-04-09 | Stop reason: HOSPADM

## 2022-04-08 RX ADMIN — TIZANIDINE 4 MG: 4 TABLET ORAL at 07:41

## 2022-04-08 RX ADMIN — POTASSIUM & SODIUM PHOSPHATES POWDER PACK 280-160-250 MG 1 PACKET: 280-160-250 PACK at 19:24

## 2022-04-08 RX ADMIN — POTASSIUM CHLORIDE 20 MEQ: 750 TABLET, EXTENDED RELEASE ORAL at 06:59

## 2022-04-08 RX ADMIN — GABAPENTIN 600 MG: 600 TABLET, FILM COATED ORAL at 19:24

## 2022-04-08 RX ADMIN — TIZANIDINE 4 MG: 4 TABLET ORAL at 19:24

## 2022-04-08 RX ADMIN — ACETAMINOPHEN 975 MG: 325 TABLET ORAL at 23:15

## 2022-04-08 RX ADMIN — ELETRIPTAN HYDROBROMIDE 40 MG: 40 TABLET, FILM COATED ORAL at 16:39

## 2022-04-08 RX ADMIN — ACETAMINOPHEN 975 MG: 325 TABLET ORAL at 07:40

## 2022-04-08 RX ADMIN — OXYCODONE HYDROCHLORIDE 10 MG: 10 TABLET ORAL at 18:44

## 2022-04-08 RX ADMIN — OXCARBAZEPINE 300 MG: 300 TABLET, FILM COATED ORAL at 21:10

## 2022-04-08 RX ADMIN — GABAPENTIN 600 MG: 600 TABLET, FILM COATED ORAL at 07:40

## 2022-04-08 RX ADMIN — OXYCODONE HYDROCHLORIDE 10 MG: 10 TABLET ORAL at 13:09

## 2022-04-08 RX ADMIN — OXYCODONE HYDROCHLORIDE 10 MG: 10 TABLET ORAL at 22:43

## 2022-04-08 RX ADMIN — ACETAMINOPHEN 975 MG: 325 TABLET ORAL at 15:53

## 2022-04-08 RX ADMIN — GABAPENTIN 600 MG: 600 TABLET, FILM COATED ORAL at 13:48

## 2022-04-08 RX ADMIN — POTASSIUM & SODIUM PHOSPHATES POWDER PACK 280-160-250 MG 1 PACKET: 280-160-250 PACK at 07:40

## 2022-04-08 RX ADMIN — CAFFEINE 200 MG: 200 TABLET ORAL at 07:40

## 2022-04-08 RX ADMIN — POTASSIUM & SODIUM PHOSPHATES POWDER PACK 280-160-250 MG 1 PACKET: 280-160-250 PACK at 13:48

## 2022-04-08 RX ADMIN — TIZANIDINE 4 MG: 4 TABLET ORAL at 13:49

## 2022-04-08 RX ADMIN — SENNOSIDES AND DOCUSATE SODIUM 1 TABLET: 8.6; 5 TABLET ORAL at 07:40

## 2022-04-08 RX ADMIN — OXCARBAZEPINE 600 MG: 600 TABLET, FILM COATED ORAL at 07:41

## 2022-04-08 RX ADMIN — SIMVASTATIN 40 MG: 40 TABLET, FILM COATED ORAL at 21:10

## 2022-04-08 ASSESSMENT — ACTIVITIES OF DAILY LIVING (ADL)
ADLS_ACUITY_SCORE: 3
ADLS_ACUITY_SCORE: 5
ADLS_ACUITY_SCORE: 3
ADLS_ACUITY_SCORE: 5
ADLS_ACUITY_SCORE: 3

## 2022-04-08 NOTE — PLAN OF CARE
Major Shift Events: No acute events. R side of face went numb while eating and returned when meal was finished, per Neurosurg this is acceptable. Current medication regimen is adequate to keep pain minimal/tolerable. SBP remained under 140 w/o PRN's. Arterial line removed. New R PIV replaced. Voiding spontaneously. BM x2. Regular diet, good appetite. Incisional dressing marked w/o extension.   Plan: Discharge tomorrow AM.   For vital signs and complete assessments, please see documentation flowsheets.     Transfer to 6A @ 4039, report given to DAYSI Mayers.       Goal Outcome Evaluation:    Plan of Care Reviewed With: patient     Overall Patient Progress: improving
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
## 2022-04-08 NOTE — PLAN OF CARE
OT/4A: DEFER. Pt observed mobilizing in halls SBA w/ PT. Pt declining any cognitive and ADL concerns and has a 21 yo son who can A w/ heavy IADLs. OT to sign off at this time. Please consult again if new needs arise.

## 2022-04-08 NOTE — PLAN OF CARE
ICU End of Shift Summary:     Pt fully intact neurologically overnight. No neuro deficits noted. No additional drainage on dressing outside of marked area. Patient complained of headache early in night but resolved. Thompson removed in AM, voided right after. OOB to bathroom, no complaints of dizziness or nausea. Diet advanced to regular. Continue with plan of care.     Refer to flow sheets for assessment and vitals.

## 2022-04-09 VITALS
RESPIRATION RATE: 16 BRPM | WEIGHT: 212.74 LBS | HEIGHT: 71 IN | HEART RATE: 70 BPM | OXYGEN SATURATION: 99 % | BODY MASS INDEX: 29.78 KG/M2 | TEMPERATURE: 99 F | DIASTOLIC BLOOD PRESSURE: 86 MMHG | SYSTOLIC BLOOD PRESSURE: 148 MMHG

## 2022-04-09 PROCEDURE — 250N000013 HC RX MED GY IP 250 OP 250 PS 637

## 2022-04-09 RX ORDER — POLYETHYLENE GLYCOL 3350 17 G/17G
17 POWDER, FOR SOLUTION ORAL 2 TIMES DAILY
Qty: 14 PACKET | Refills: 0 | Status: SHIPPED | OUTPATIENT
Start: 2022-04-09 | End: 2022-04-16

## 2022-04-09 RX ORDER — ACETAMINOPHEN 325 MG/1
650 TABLET ORAL EVERY 6 HOURS PRN
Qty: 56 TABLET | Refills: 0 | Status: SHIPPED | OUTPATIENT
Start: 2022-04-09 | End: 2022-04-16

## 2022-04-09 RX ORDER — AMOXICILLIN 250 MG
1 CAPSULE ORAL 2 TIMES DAILY
Qty: 14 TABLET | Refills: 0 | Status: SHIPPED | OUTPATIENT
Start: 2022-04-09 | End: 2022-04-16

## 2022-04-09 RX ORDER — OXYCODONE HYDROCHLORIDE 5 MG/1
5 TABLET ORAL EVERY 6 HOURS PRN
Qty: 20 TABLET | Refills: 0 | Status: SHIPPED | OUTPATIENT
Start: 2022-04-09 | End: 2022-04-09

## 2022-04-09 RX ORDER — OXYCODONE HYDROCHLORIDE 5 MG/1
5 TABLET ORAL EVERY 6 HOURS PRN
Qty: 20 TABLET | Refills: 0 | Status: SHIPPED | OUTPATIENT
Start: 2022-04-09 | End: 2022-04-14

## 2022-04-09 RX ADMIN — ACETAMINOPHEN 975 MG: 325 TABLET ORAL at 08:04

## 2022-04-09 RX ADMIN — GABAPENTIN 600 MG: 600 TABLET, FILM COATED ORAL at 08:04

## 2022-04-09 RX ADMIN — OXYCODONE HYDROCHLORIDE 10 MG: 10 TABLET ORAL at 08:05

## 2022-04-09 RX ADMIN — OXYCODONE HYDROCHLORIDE 10 MG: 10 TABLET ORAL at 04:07

## 2022-04-09 RX ADMIN — TIZANIDINE 4 MG: 4 TABLET ORAL at 08:04

## 2022-04-09 RX ADMIN — OXCARBAZEPINE 600 MG: 600 TABLET, FILM COATED ORAL at 08:05

## 2022-04-09 ASSESSMENT — ACTIVITIES OF DAILY LIVING (ADL)
ADLS_ACUITY_SCORE: 3
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
## 2022-04-09 NOTE — DISCHARGE SUMMARY
This RN was informed by Charge RN that MD Mccall had stated pt can discharge now.      Discharge time/date: 9:35 AM 04/09/22  Walked or Wheelchair: Walked  PIV removed: Yes  Reviewed AVS with patient: Yes  Medication due times added to AVS in EPIC: Yes  Verbalized understanding of discharge with teachback: Yes  Medications retrieved from pharmacy: instructed to retrieve on the way out  Supplies sent home: No supplies to sent  Belongings from security with patient: Yes
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
## 2022-04-10 NOTE — PLAN OF CARE
Physical Therapy Discharge Summary    Reason for therapy discharge:    Discharged to home.    Progress towards therapy goal(s). See goals on Care Plan in [facility] electronic health record for goal details.  Goals partially met.  Barriers to achieving goals:   discharge from facility.    Therapy recommendation(s):    No further therapy is recommended.

## 2022-04-12 NOTE — DISCHARGE SUMMARY
[facility] Discharge Summary and Instructions    Jossue Torres MRN# [id number]   Age: 51 year old YOB: 1970     Date of Admission:  4/7/2022  Date of Discharge::  4/9/2022  9:35 AM  Admitting Physician:  Grey Simeon MD  Discharge Physician:  Grey Simeon MD          Admission Diagnoses:   Trigeminal neuralgia [G50.0]  Status post craniotomy [Z98.890]          Discharge Diagnosis:     Trigeminal neuralgia [G50.0]  Status post craniotomy [Z98.890]          Procedures:   Procedure(s):  Right microvascular decompression  Latex Free           Brief History of Illness:   Jossue Torres is a 51 year old male, with a past medical history of trigeminal neuralgia (primarily in the right V3 distribution).       Hospital Course:   Patient underwent the above procedure on 4/7/2022. The operation was uncomplicated, and patient was admitted postoperatively to the fourth floor neuro ICU unit.  Postop day 1, patient reported no trigeminal neuralgia symptoms.  On postoperative day 2, patient was doing well, ambulating, voiding without a Thompson, eating regular diet, and having controllable pain.  Therefore he was discharged on postoperative day 2 with plans to be seen in 2 weeks for wound check.  On discharge he was not having any trigeminal neuralgia symptoms and was reporting significant symptom relief.  Exam:  Pupils equal, round and reactive to light.  Extraocular movements full. Visual fields full.   Face moves symmetrically and has full sensation. Tongue midline.  Motor strength 5/5, reflexes 2/4. Toe are down-going. Full sensation.   Right-sided posterior auricular incision clean/dry/intact  Clinically Significant Risk Factors Present on Admission              Trigeminal neuralgia  Migraines          Discharge Medications:     Discharge Medication List as of 4/9/2022  9:16 AM      START taking these medications    Details   acetaminophen (TYLENOL) 325 MG tablet Take 2 tablets (650 mg) by  mouth every 6 hours as needed for mild pain, Disp-56 tablet, R-0, E-Prescribe      polyethylene glycol (MIRALAX) 17 g packet Take 17 g by mouth 2 times daily for 7 days, Disp-14 packet, R-0, E-PrescribeTake while taking narcotics. Can cause soft and loose stools.      senna-docusate (SENOKOT-S/PERICOLACE) 8.6-50 MG tablet Take 1 tablet by mouth 2 times daily for 7 days, Disp-14 tablet, R-0, E-PrescribeTake while taking narcotics. Can cause soft and loose stool.         CONTINUE these medications which have CHANGED    Details   oxyCODONE (ROXICODONE) 5 MG tablet Take 1 tablet (5 mg) by mouth every 6 hours as needed for pain, Disp-20 tablet, R-0, Local Print         CONTINUE these medications which have NOT CHANGED    Details   acetaminophen (TYLENOL 8 HOUR) 650 MG CR tablet Take 1,300 mg by mouth every 8 hours as needed for mild pain or fever, Historical      eletriptan (RELPAX) 40 MG tablet Take 1 tablet by mouth. repeat after 2 hours if needed., Disp-24 tablet, R-11, E-Prescribe      HYDROcodone-acetaminophen (NORCO) 5-325 MG tablet Take 1 tablet by mouth every 6 hours as needed for severe pain, Historical      losartan (COZAAR) 100 MG tablet Take 1 tablet (100 mg) by mouth daily, Disp-90 tablet, R-3, E-Prescribe      simvastatin (ZOCOR) 40 MG tablet Take 1 tablet (40 mg) by mouth At Bedtime, Disp-90 tablet, R-3, E-Prescribe      tiZANidine (ZANAFLEX) 4 MG capsule Take 1 capsule (4 mg) by mouth 3 times daily, Disp-90 capsule, R-2, E-Prescribe      gabapentin (NEURONTIN) 600 MG tablet Take 1 tablet (600 mg) by mouth 3 times daily, Disp-90 tablet, R-0, E-Prescribe      hydrochlorothiazide (HYDRODIURIL) 25 MG tablet Take 1 tablet (25 mg) by mouth daily, Disp-30 tablet, R-10, E-Prescribe      OXcarbazepine (TRILEPTAL) 300 MG tablet Take 2 tabs in the morning and 1 tab at night., Disp-270 tablet, R-0, E-Prescribe         STOP taking these medications       aspirin-acetaminophen-caffeine (EXCEDRIN MIGRAINE) 250-250-65 MG  tablet Comments:   Reason for Stopping:         ibuprofen 200 MG capsule Comments:   Reason for Stopping:                         Discharge Instructions and Follow-Up:     Discharge diet: Regular   Discharge activity: You may advance activity as tolerated. No strenuous exercise or heay lifting greater than 10 lbs for 4 weeks or until seen and cleared in clinic.   Discharge follow-up: Follow-up with Dr. Grey Simeon MD in 2 weeks for wound check.    Wound care: Wound cares after surgery  - You are ok to shower, but do not soak your incisions. Pat them dry if they get damp.   - Avoid coloring your hair or permanent styling until cleared by your surgeon  - No baths, hot tubs or pools for 4-6 weeks after surgery.   - No strenuous activity.  - No lifting more than 10 pounds until you are seen in clinic (a gallon of milk weighs approximately 8 pounds)       Please call if you have:  1. increased pain, redness, drainage, swelling at your incision  2. fevers > 101.5 F degrees  3. with any questions or concerns.  You may reach the Neurosurgery clinic at [phone] during regular work hours. ER at [phone].    and ask for the Neurosurgery Resident on call at [phone], during off hours or weekends.         Discharge Disposition:     Discharged to home          Lio Mccall MD  Neurosurgery Resident, PGY-2

## 2022-04-12 NOTE — OP NOTE
Neurosurgery Operative Report     Procedure Date: 04/7/22     PREOPERATIVE DIAGNOSIS:  Trigeminal neuralgia.      POSTOPERATIVE DIAGNOSIS:  Trigeminal neuralgia.      PROCEDURES PERFORMED:   1.  Right-sided microvascular decompression.   2.  Intraoperative use of microscope.      ATTENDING SURGEON:  Grey Simeon MD      RESIDENT SURGEON:  Maria Esther Katz MD      ANESTHESIA:  General.      ESTIMATED BLOOD LOSS:  50 mL.      INDICATIONS:  Mr. Torres is a 51 year old male who presented to clinic with V3 trigeminal neuralgia on the right side. He had maximized medical management with a several different medication regimens. On imaging, it appeared that a vein as well as possibly the SCA was compressive on the trigeminal nerve. Therefore, the above named surgery was recommended. After risks, benefits and alternatives were discussed, patient elected to proceed with the above named surgery.      DESCRIPTION OF PROCEDURE:    After consent form was verified, the patient was brought to the operating room where after general anesthesia was induced, endotracheal tube was placed. Thompson catheter and arterial lines were placed for monitoring as well as brainstem auditory evoked responses for monitoring of the auditory nerve. A moore head buckley was applied with 60 lbs of pressure, and the patient was then placed in the lateral decubitus position with the right side up. His head was placed with slight flexion forward and laterally towards the floor. We secured this to the bed and we ensured that all pressure points were padded for the duration of the case. The patient was strapped to the bed with tape and safety straps to ensure safety when rotating the bed, which was tested prior to draping.     An inverse U incision was planned in the right retromastoid region approximately a fingerbreadth behind the mastoid notch along the hairline from the top of the pinna to the bottom of the ear. After prep and drape in the usual sterile  fashion, we performed our time out and injected with 0.25% Marcaine with epinephrine. This incision was carried through the subcutaneous tissues, the galea and muscle down to the skull. Cerebellar retractors were used to maintain exposure. Then, the 5 mm cutting drill was used to make a single bryce hole on the inion-meatal line approximately 4.5 cm behind the external auditory canal. After this bryce hole was made, we were able to visualize the transverse sinus. We used the drill to remove more bone anteriorly and inferiorly to expose the junction of the transverse sinus and the sigmoid sinus as well as the posterior aspect of the sigmoid sinus. We then carefully waxed all the exposed bone edges and mastoid air cells.     Once we had obtained our dural exposure, we then opened the dura in a curvilinear fashion along the transverse and sigmoid sinus. We placed a Telfa over the cerebellum and removed cerebrospinal fluid from the junction of the tentorium and the petrous bone, allowing cerebellar relaxation. After relaxation, we were able to inspect the surface of the cerebellum and we obtained hemostasis with bipolar cautery and followed the junction of the tentorium and the petrous bone down to the superior petrosal vein. This was coagulated and cut. We then irrigated with saline to verify hemostasis.     We then dissected down to cranial nerve 5 and we visualized that the superior petrosal vein had been directly pushing on the nerve. The CN 7/8 complex was seen just superficial and inferior to CN 5. We dissected around CN 5 on both sides and also posteriorly on the nerve to ensure that there was not any other compression. We then very carefully placed a triangular shaped Telfa to protect the dorsal root entry zone from this vein. We then irrigated and assured hemostasis.     The dura was closed with a duramatrix patch that was tucked under the native dura and was sutured 4-0 neurolon suture. After a valsalva maneuver  showed no CSF extrusion, we used duraseal over the dural closure. Mesh cranioplasty was used to cover the craniectomy site. We then irrigated with copious amounts of saline again and verified hemostasis with bipolar cautery and monopolar cautery. The muscle was reapproximated with 2-0 Vicryl. Then, the galea was closed with 2-0 Vicryl and the skin was closed with 3-0 nylon in a running fashion. The incision was cleaned and dressed in the usual sterile fashion and then he was turned supine onto the hospital bed and head removed from the [place] headholder.     All counts were correct at the end case x 2.      Dr. Simeon was present for all critical portions of the operation and immediately available at all times.      Maria Esther Katz MD  Neurosurgery PGY7

## 2022-04-13 ENCOUNTER — TELEPHONE (OUTPATIENT)
Dept: NEUROSURGERY | Facility: CLINIC | Age: 52
End: 2022-04-13
Payer: COMMERCIAL

## 2022-04-13 DIAGNOSIS — G50.0 TRIGEMINAL NEURALGIA: Primary | ICD-10-CM

## 2022-04-13 NOTE — TELEPHONE ENCOUNTER
Spoke with patient, states procedure Rt MVD went well, home , showered, NO facial pain at all.     Patient is having headaches and uising Oxycodone 5mg given at discharge for the headaches.  Patient has 3 Oxycodone left, discharged 4/9/22.    New headache plan   Tylenol arthritis 650mg 2 tablets every 8 hours.   Ibuprofen 200mg 2-3 tablets every 8 hours alternating medications.  Using Oxycodone 5mg in the morning and evening.    Patient asking for another 6-8 tablets of Oxycodone.    Will refer to Alyssa Haro NP regarding Oxycodone 5mg qty 8 tablets refill.      Patient states other than headaches during great, incision is clean dry, no drainage, no fever, no redness, states looking good.    Will get back to patient with medication request.  Voices understanding.

## 2022-04-14 RX ORDER — OXYCODONE HYDROCHLORIDE 5 MG/1
5 TABLET ORAL 2 TIMES DAILY PRN
Qty: 8 TABLET | Refills: 0 | Status: SHIPPED | OUTPATIENT
Start: 2022-04-14 | End: 2022-05-24

## 2022-04-21 NOTE — PROGRESS NOTES
[facility]  Department of Neurosurgery      Name: Jossue Torres  MRN: [id number]  Age: 51 year old  : 1970  Referring provider: Grey Simeon  2022      Chief Complaint:   Right V3 Trigeminal Neuralgia   S/p Right MVD on 2022  2 weeks post op visit    History of Present Illness:   Jossue Torres is a 51 year old male who presented to our clinic with V3 trigeminal neuralgia on the right side. He had maximized medical management with a several different medication regimens. On imaging, it appeared that a vein as well as possibly the SCA was compressive on the trigeminal nerve. On 2022 patient underwent Right-sided microvascular decompression by Dr. Simeon. He was discharged from the hospital on 2022.    Today, patient presents for 2 weeks post op visit. He presents to the evaluation alone. He reports that he has not had any Trigeminal Neuralgia attacks since surgery and is extremely happy with the results. He had numbness around the surgical incision and it is getting better. Incision has been healing well. No fever since hospital discharge. Today, his main concern is ongoing incisional pain/ headache. He was taking oxycodone prn until yesterday along with Ibuprofen. He has a h/o migraines for which he uses Amitriptilline.     Prior to surgery, for Trigeminal Neuralgia, he was on Gabapentin 600 three times a day, Oxcarbazepine 300 three times a day and Tizanidine 4 mg three times a day. He has not made any medication changes yet.  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       Review of Systems:   Pertinent items are noted in HPI or as in patient entered ROS below, remainder of complete ROS is negative.   No flowsheet data found.     Physical Exam:   BP (!) 152/112   Pulse 84   Resp 16   Wt 101.6 kg (224 lb)   SpO2 98%   BMI 31.24 kg/m     General: No acute distress.    Neuro: The patient is fully oriented. Speech is  normal. Extraocular movements are intact without nystagmus. Facial sensation is intact in V1, V2, V3 distributions. Facial nerve function is normal, rated as a House Brackmann 1.  Shoulders are full strength. There is no pronator drift. Full strength in all extremities. Sensation intact throughout. No dysmetria with finger-nose-finger testing. Gait is normal.  Psych: Normal mood and affect. Behavior is normal.    Incision: Right MVD incision with sutures, CDI. No redness or drainage.     Procedures:  Sutures removed from right MVD incision. Patient tolerated the procedure well.     Assessment:  Right V3 Trigeminal Neuralgia   S/p Right MVD on 4/7/2022  2 weeks post op visit    Plan:  Overall patient is very happy with the surgery as he has been pain free from Trigeminal Neuralgia post operatively. He still struggles with incisional pain/ headache. Oxycodone 5 mg q6h prn for 20 tablets refilled. Patient is also on Tizanidine 4 mg three times a day. He also uses Ibuprofen as needed.     He will start to wean off Oxcarbazepine slowly. Patient to follow up with me in 3 weeks.        I spent 40 minutes on patient care activities related to this encounter on the date of service, including time spent reviewing the chart, obtaining history and examination and in counseling the patient, and in documentation in the electronic medical record.      Alyssa MURPHY CNP  Department of Neurosurgery

## 2022-04-22 ENCOUNTER — OFFICE VISIT (OUTPATIENT)
Dept: NEUROSURGERY | Facility: CLINIC | Age: 52
End: 2022-04-22
Payer: COMMERCIAL

## 2022-04-22 VITALS
HEART RATE: 84 BPM | BODY MASS INDEX: 31.24 KG/M2 | RESPIRATION RATE: 16 BRPM | OXYGEN SATURATION: 98 % | DIASTOLIC BLOOD PRESSURE: 112 MMHG | SYSTOLIC BLOOD PRESSURE: 152 MMHG | WEIGHT: 224 LBS

## 2022-04-22 DIAGNOSIS — G50.0 TRIGEMINAL NEURALGIA: Primary | ICD-10-CM

## 2022-04-22 PROCEDURE — 99024 POSTOP FOLLOW-UP VISIT: CPT | Performed by: NURSE PRACTITIONER

## 2022-04-22 RX ORDER — OXYCODONE HYDROCHLORIDE 5 MG/1
5 TABLET ORAL EVERY 6 HOURS PRN
Qty: 20 TABLET | Refills: 0 | Status: SHIPPED | OUTPATIENT
Start: 2022-04-22 | End: 2022-04-29

## 2022-04-22 ASSESSMENT — PAIN SCALES - GENERAL: PAINLEVEL: NO PAIN (0)

## 2022-04-22 NOTE — PATIENT INSTRUCTIONS
Start weaning down Oxcarbazepine.     2. Oxycodone as needed for pain.     3. You may advance activity as tolerated. No strenuous exercise or heay lifting greater than 10 lbs for 4 weeks.     4. Follow-up with me in 3 weeks. Virtual visit is fine.

## 2022-04-22 NOTE — LETTER
2022     RE: Jossue Torres  [id number] [facility] [place] [address]     Dear Colleague,    Thank you for referring your patient, Jossue Torres, to the [facility] NEUROSURGERY CLINIC [place] at [facility]. Please see a copy of my visit note below.    [facility]  Department of Neurosurgery      Name: Jossue Torres  MRN: [id number]  Age: 51 year old  : 1970  Referring provider: Grey Simeon  2022      Chief Complaint:   Right V3 Trigeminal Neuralgia   S/p Right MVD on 2022  2 weeks post op visit    History of Present Illness:   Jossue Torres is a 51 year old male who presented to our clinic with V3 trigeminal neuralgia on the right side. He had maximized medical management with a several different medication regimens. On imaging, it appeared that a vein as well as possibly the SCA was compressive on the trigeminal nerve. On 2022 patient underwent Right-sided microvascular decompression by Dr. Simeon. He was discharged from the hospital on 2022.    Today, patient presents for 2 weeks post op visit. He presents to the evaluation alone. He reports that he has not had any Trigeminal Neuralgia attacks since surgery and is extremely happy with the results. He had numbness around the surgical incision and it is getting better. Incision has been healing well. No fever since hospital discharge. Today, his main concern is ongoing incisional pain/ headache. He was taking oxycodone prn until yesterday along with Ibuprofen. He has a h/o migraines for which he uses Amitriptilline.     Prior to surgery, for Trigeminal Neuralgia, he was on Gabapentin 600 three times a day, Oxcarbazepine 300 three times a day and Tizanidine 4 mg three times a day. He has not made any medication changes yet.  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       Review of Systems:   Pertinent items  are noted in HPI or as in patient entered ROS below, remainder of complete ROS is negative.   No flowsheet data found.     Physical Exam:   BP (!) 152/112   Pulse 84   Resp 16   Wt 101.6 kg (224 lb)   SpO2 98%   BMI 31.24 kg/m     General: No acute distress.    Neuro: The patient is fully oriented. Speech is normal. Extraocular movements are intact without nystagmus. Facial sensation is intact in V1, V2, V3 distributions. Facial nerve function is normal, rated as a House Brackmann 1.  Shoulders are full strength. There is no pronator drift. Full strength in all extremities. Sensation intact throughout. No dysmetria with finger-nose-finger testing. Gait is normal.  Psych: Normal mood and affect. Behavior is normal.    Incision: Right MVD incision with sutures, CDI. No redness or drainage.     Procedures:  Sutures removed from right MVD incision. Patient tolerated the procedure well.     Assessment:  Right V3 Trigeminal Neuralgia   S/p Right MVD on 4/7/2022  2 weeks post op visit    Plan:  Overall patient is very happy with the surgery as he has been pain free from Trigeminal Neuralgia post operatively. He still struggles with incisional pain/ headache. Oxycodone 5 mg q6h prn for 20 tablets refilled. Patient is also on Tizanidine 4 mg three times a day. He also uses Ibuprofen as needed.     He will start to wean off Oxcarbazepine slowly. Patient to follow up with me in 3 weeks.      I spent 40 minutes on patient care activities related to this encounter on the date of service, including time spent reviewing the chart, obtaining history and examination and in counseling the patient, and in documentation in the electronic medical record.      Alyssa MURPHY CNP  Department of Neurosurgery

## 2022-05-04 ENCOUNTER — TELEPHONE (OUTPATIENT)
Dept: NEUROSURGERY | Facility: CLINIC | Age: 52
End: 2022-05-04
Payer: COMMERCIAL

## 2022-05-04 NOTE — TELEPHONE ENCOUNTER
Spoke with patient, patient states all electrical pain   oFFICE notes faxed to  [phone].  Attn Iza    Return to work 5/19/22, patient will email form for completion.    Patient is doing well, tapering off Gabapentin, only taking Oxcarbazepine one tablet daily and will stop med this weekend.      Patient scheduled for last F/U visit with Alyssa.  All is well

## 2022-05-06 ENCOUNTER — ANESTHESIA EVENT (OUTPATIENT)
Dept: SURGERY | Facility: CLINIC | Age: 52
End: 2022-05-06
Payer: COMMERCIAL

## 2022-05-06 ENCOUNTER — ANESTHESIA (OUTPATIENT)
Dept: SURGERY | Facility: CLINIC | Age: 52
End: 2022-05-06
Payer: COMMERCIAL

## 2022-05-06 ENCOUNTER — HOSPITAL ENCOUNTER (INPATIENT)
Facility: CLINIC | Age: 52
LOS: 5 days | Discharge: HOME | End: 2022-05-11
Attending: EMERGENCY MEDICINE | Admitting: NEUROLOGICAL SURGERY
Payer: COMMERCIAL

## 2022-05-06 ENCOUNTER — TELEPHONE (OUTPATIENT)
Dept: NEUROSURGERY | Facility: CLINIC | Age: 52
End: 2022-05-06
Payer: COMMERCIAL
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
## 2022-05-06 DIAGNOSIS — T81.49XA POSTOPERATIVE WOUND INFECTION: ICD-10-CM

## 2022-05-06 DIAGNOSIS — Z98.890 STATUS POST CRANIOTOMY: Primary | ICD-10-CM

## 2022-05-06 DIAGNOSIS — Z20.822 CONTACT WITH AND (SUSPECTED) EXPOSURE TO COVID-19: ICD-10-CM

## 2022-05-06 LAB
ABO/RH(D): NORMAL
ANION GAP SERPL CALCULATED.3IONS-SCNC: 6 MMOL/L (ref 3–14)
ANTIBODY SCREEN: NEGATIVE
APTT PPP: 28 SECONDS (ref 22–38)
BASOPHILS # BLD AUTO: 0.1 10E3/UL (ref 0–0.2)
BASOPHILS NFR BLD AUTO: 1 %
BUN SERPL-MCNC: 18 MG/DL (ref 7–30)
CALCIUM SERPL-MCNC: 9.6 MG/DL (ref 8.5–10.1)
CHLORIDE BLD-SCNC: 103 MMOL/L (ref 94–109)
CO2 SERPL-SCNC: 28 MMOL/L (ref 20–32)
CREAT SERPL-MCNC: 1.23 MG/DL (ref 0.66–1.25)
CRP SERPL-MCNC: 11 MG/L (ref 0–8)
EOSINOPHIL # BLD AUTO: 0.4 10E3/UL (ref 0–0.7)
EOSINOPHIL NFR BLD AUTO: 4 %
ERYTHROCYTE [DISTWIDTH] IN BLOOD BY AUTOMATED COUNT: 12.6 % (ref 10–15)
GFR SERPL CREATININE-BSD FRML MDRD: 71 ML/MIN/1.73M2
GLUCOSE BLD-MCNC: 91 MG/DL (ref 70–99)
GLUCOSE BLDC GLUCOMTR-MCNC: 107 MG/DL (ref 70–99)
HCT VFR BLD AUTO: 44.9 % (ref 40–53)
HGB BLD-MCNC: 14.9 G/DL (ref 13.3–17.7)
HOLD SPECIMEN: NORMAL
IMM GRANULOCYTES # BLD: 0.1 10E3/UL
IMM GRANULOCYTES NFR BLD: 1 %
INR PPP: 0.94 (ref 0.85–1.15)
LYMPHOCYTES # BLD AUTO: 2 10E3/UL (ref 0.8–5.3)
LYMPHOCYTES NFR BLD AUTO: 21 %
MCH RBC QN AUTO: 32.4 PG (ref 26.5–33)
MCHC RBC AUTO-ENTMCNC: 33.2 G/DL (ref 31.5–36.5)
MCV RBC AUTO: 98 FL (ref 78–100)
MONOCYTES # BLD AUTO: 0.6 10E3/UL (ref 0–1.3)
MONOCYTES NFR BLD AUTO: 6 %
NEUTROPHILS # BLD AUTO: 6.5 10E3/UL (ref 1.6–8.3)
NEUTROPHILS NFR BLD AUTO: 67 %
NRBC # BLD AUTO: 0 10E3/UL
NRBC BLD AUTO-RTO: 0 /100
PLATELET # BLD AUTO: 316 10E3/UL (ref 150–450)
POTASSIUM BLD-SCNC: 4 MMOL/L (ref 3.4–5.3)
RBC # BLD AUTO: 4.6 10E6/UL (ref 4.4–5.9)
SARS-COV-2 RNA RESP QL NAA+PROBE: NEGATIVE
SODIUM SERPL-SCNC: 137 MMOL/L (ref 133–144)
SPECIMEN EXPIRATION DATE: NORMAL
WBC # BLD AUTO: 9.7 10E3/UL (ref 4–11)

## 2022-05-06 PROCEDURE — 10180 I&D COMPLEX PO WOUND INFCTJ: CPT | Mod: 78 | Performed by: NEUROLOGICAL SURGERY

## 2022-05-06 PROCEDURE — 87075 CULTR BACTERIA EXCEPT BLOOD: CPT | Performed by: NEUROLOGICAL SURGERY

## 2022-05-06 PROCEDURE — 85025 COMPLETE CBC W/AUTO DIFF WBC: CPT | Performed by: EMERGENCY MEDICINE

## 2022-05-06 PROCEDURE — 87077 CULTURE AEROBIC IDENTIFY: CPT | Performed by: NEUROLOGICAL SURGERY

## 2022-05-06 PROCEDURE — 999N000141 HC STATISTIC PRE-PROCEDURE NURSING ASSESSMENT: Performed by: NEUROLOGICAL SURGERY

## 2022-05-06 PROCEDURE — 86140 C-REACTIVE PROTEIN: CPT | Performed by: STUDENT IN AN ORGANIZED HEALTH CARE EDUCATION/TRAINING PROGRAM

## 2022-05-06 PROCEDURE — U0003 INFECTIOUS AGENT DETECTION BY NUCLEIC ACID (DNA OR RNA); SEVERE ACUTE RESPIRATORY SYNDROME CORONAVIRUS 2 (SARS-COV-2) (CORONAVIRUS DISEASE [COVID-19]), AMPLIFIED PROBE TECHNIQUE, MAKING USE OF HIGH THROUGHPUT TECHNOLOGIES AS DESCRIBED BY CMS-2020-01-R: HCPCS | Performed by: STUDENT IN AN ORGANIZED HEALTH CARE EDUCATION/TRAINING PROGRAM

## 2022-05-06 PROCEDURE — C9803 HOPD COVID-19 SPEC COLLECT: HCPCS | Performed by: EMERGENCY MEDICINE

## 2022-05-06 PROCEDURE — 87102 FUNGUS ISOLATION CULTURE: CPT | Performed by: NEUROLOGICAL SURGERY

## 2022-05-06 PROCEDURE — 87040 BLOOD CULTURE FOR BACTERIA: CPT | Performed by: STUDENT IN AN ORGANIZED HEALTH CARE EDUCATION/TRAINING PROGRAM

## 2022-05-06 PROCEDURE — 99283 EMERGENCY DEPT VISIT LOW MDM: CPT | Performed by: EMERGENCY MEDICINE

## 2022-05-06 PROCEDURE — 250N000009 HC RX 250: Performed by: STUDENT IN AN ORGANIZED HEALTH CARE EDUCATION/TRAINING PROGRAM

## 2022-05-06 PROCEDURE — 360N000077 HC SURGERY LEVEL 4, PER MIN: Performed by: NEUROLOGICAL SURGERY

## 2022-05-06 PROCEDURE — 96360 HYDRATION IV INFUSION INIT: CPT | Performed by: EMERGENCY MEDICINE

## 2022-05-06 PROCEDURE — 258N000003 HC RX IP 258 OP 636: Performed by: EMERGENCY MEDICINE

## 2022-05-06 PROCEDURE — 36415 COLL VENOUS BLD VENIPUNCTURE: CPT | Performed by: EMERGENCY MEDICINE

## 2022-05-06 PROCEDURE — 272N000001 HC OR GENERAL SUPPLY STERILE: Performed by: NEUROLOGICAL SURGERY

## 2022-05-06 PROCEDURE — 85610 PROTHROMBIN TIME: CPT | Performed by: EMERGENCY MEDICINE

## 2022-05-06 PROCEDURE — 96361 HYDRATE IV INFUSION ADD-ON: CPT | Performed by: EMERGENCY MEDICINE

## 2022-05-06 PROCEDURE — 99285 EMERGENCY DEPT VISIT HI MDM: CPT | Mod: 25 | Performed by: EMERGENCY MEDICINE

## 2022-05-06 PROCEDURE — 87205 SMEAR GRAM STAIN: CPT | Performed by: NEUROLOGICAL SURGERY

## 2022-05-06 PROCEDURE — 200N000002 HC R&B ICU UMMC

## 2022-05-06 PROCEDURE — 250N000011 HC RX IP 250 OP 636: Performed by: STUDENT IN AN ORGANIZED HEALTH CARE EDUCATION/TRAINING PROGRAM

## 2022-05-06 PROCEDURE — 80048 BASIC METABOLIC PNL TOTAL CA: CPT | Performed by: EMERGENCY MEDICINE

## 2022-05-06 PROCEDURE — 370N000017 HC ANESTHESIA TECHNICAL FEE, PER MIN: Performed by: NEUROLOGICAL SURGERY

## 2022-05-06 PROCEDURE — 250N000011 HC RX IP 250 OP 636: Performed by: NEUROLOGICAL SURGERY

## 2022-05-06 PROCEDURE — 250N000009 HC RX 250: Performed by: NEUROLOGICAL SURGERY

## 2022-05-06 PROCEDURE — 86901 BLOOD TYPING SEROLOGIC RH(D): CPT | Performed by: EMERGENCY MEDICINE

## 2022-05-06 PROCEDURE — 86850 RBC ANTIBODY SCREEN: CPT | Performed by: EMERGENCY MEDICINE

## 2022-05-06 PROCEDURE — 710N000011 HC RECOVERY PHASE 1, LEVEL 3, PER MIN: Performed by: NEUROLOGICAL SURGERY

## 2022-05-06 PROCEDURE — 36415 COLL VENOUS BLD VENIPUNCTURE: CPT | Performed by: STUDENT IN AN ORGANIZED HEALTH CARE EDUCATION/TRAINING PROGRAM

## 2022-05-06 PROCEDURE — 258N000003 HC RX IP 258 OP 636: Performed by: STUDENT IN AN ORGANIZED HEALTH CARE EDUCATION/TRAINING PROGRAM

## 2022-05-06 PROCEDURE — 85730 THROMBOPLASTIN TIME PARTIAL: CPT | Performed by: EMERGENCY MEDICINE

## 2022-05-06 PROCEDURE — 0NP004Z REMOVAL OF INTERNAL FIXATION DEVICE FROM SKULL, OPEN APPROACH: ICD-10-PCS | Performed by: NEUROLOGICAL SURGERY

## 2022-05-06 PROCEDURE — 62142 RMVL B1 FLP/PROSTC PLATE SKL: CPT | Mod: 78 | Performed by: NEUROLOGICAL SURGERY

## 2022-05-06 PROCEDURE — 250N000025 HC SEVOFLURANE, PER MIN: Performed by: NEUROLOGICAL SURGERY

## 2022-05-06 RX ORDER — SODIUM CHLORIDE, SODIUM LACTATE, POTASSIUM CHLORIDE, CALCIUM CHLORIDE 600; 310; 30; 20 MG/100ML; MG/100ML; MG/100ML; MG/100ML
INJECTION, SOLUTION INTRAVENOUS CONTINUOUS PRN
Status: DISCONTINUED | OUTPATIENT
Start: 2022-05-06 | End: 2022-05-07 | Stop reason: CLARIF

## 2022-05-06 RX ORDER — ONDANSETRON 2 MG/ML
4 INJECTION INTRAMUSCULAR; INTRAVENOUS EVERY 30 MIN PRN
Status: DISCONTINUED | OUTPATIENT
Start: 2022-05-06 | End: 2022-05-07

## 2022-05-06 RX ORDER — PROPOFOL 10 MG/ML
INJECTION, EMULSION INTRAVENOUS PRN
Status: DISCONTINUED | OUTPATIENT
Start: 2022-05-06 | End: 2022-05-07 | Stop reason: CLARIF

## 2022-05-06 RX ORDER — DEXAMETHASONE SODIUM PHOSPHATE 4 MG/ML
INJECTION, SOLUTION INTRA-ARTICULAR; INTRALESIONAL; INTRAMUSCULAR; INTRAVENOUS; SOFT TISSUE PRN
Status: DISCONTINUED | OUTPATIENT
Start: 2022-05-06 | End: 2022-05-07 | Stop reason: CLARIF

## 2022-05-06 RX ORDER — ONDANSETRON 2 MG/ML
INJECTION INTRAMUSCULAR; INTRAVENOUS PRN
Status: DISCONTINUED | OUTPATIENT
Start: 2022-05-06 | End: 2022-05-07 | Stop reason: CLARIF

## 2022-05-06 RX ORDER — SODIUM CHLORIDE, SODIUM LACTATE, POTASSIUM CHLORIDE, CALCIUM CHLORIDE 600; 310; 30; 20 MG/100ML; MG/100ML; MG/100ML; MG/100ML
INJECTION, SOLUTION INTRAVENOUS CONTINUOUS
Status: DISCONTINUED | OUTPATIENT
Start: 2022-05-06 | End: 2022-05-07

## 2022-05-06 RX ORDER — FENTANYL CITRATE 50 UG/ML
25 INJECTION, SOLUTION INTRAMUSCULAR; INTRAVENOUS EVERY 5 MIN PRN
Status: DISCONTINUED | OUTPATIENT
Start: 2022-05-06 | End: 2022-05-07

## 2022-05-06 RX ORDER — CEFAZOLIN SODIUM 2 G/100ML
INJECTION, SOLUTION INTRAVENOUS PRN
Status: DISCONTINUED | OUTPATIENT
Start: 2022-05-06 | End: 2022-05-07 | Stop reason: CLARIF

## 2022-05-06 RX ORDER — HYDROMORPHONE HCL IN WATER/PF 6 MG/30 ML
0.2 PATIENT CONTROLLED ANALGESIA SYRINGE INTRAVENOUS EVERY 5 MIN PRN
Status: DISCONTINUED | OUTPATIENT
Start: 2022-05-06 | End: 2022-05-07

## 2022-05-06 RX ORDER — LIDOCAINE HYDROCHLORIDE 20 MG/ML
INJECTION, SOLUTION INFILTRATION; PERINEURAL PRN
Status: DISCONTINUED | OUTPATIENT
Start: 2022-05-06 | End: 2022-05-07 | Stop reason: CLARIF

## 2022-05-06 RX ORDER — FENTANYL CITRATE 50 UG/ML
INJECTION, SOLUTION INTRAMUSCULAR; INTRAVENOUS PRN
Status: DISCONTINUED | OUTPATIENT
Start: 2022-05-06 | End: 2022-05-07 | Stop reason: CLARIF

## 2022-05-06 RX ORDER — HYDRALAZINE HYDROCHLORIDE 20 MG/ML
2.5-5 INJECTION INTRAMUSCULAR; INTRAVENOUS EVERY 10 MIN PRN
Status: DISCONTINUED | OUTPATIENT
Start: 2022-05-06 | End: 2022-05-07

## 2022-05-06 RX ORDER — LABETALOL HYDROCHLORIDE 5 MG/ML
10 INJECTION, SOLUTION INTRAVENOUS
Status: DISCONTINUED | OUTPATIENT
Start: 2022-05-06 | End: 2022-05-07

## 2022-05-06 RX ORDER — ONDANSETRON 4 MG/1
4 TABLET, ORALLY DISINTEGRATING ORAL EVERY 30 MIN PRN
Status: DISCONTINUED | OUTPATIENT
Start: 2022-05-06 | End: 2022-05-07

## 2022-05-06 RX ORDER — OXYCODONE HYDROCHLORIDE 5 MG/1
5 TABLET ORAL EVERY 4 HOURS PRN
Status: DISCONTINUED | OUTPATIENT
Start: 2022-05-06 | End: 2022-05-07

## 2022-05-06 RX ADMIN — LIDOCAINE HYDROCHLORIDE 40 MG: 20 INJECTION, SOLUTION INFILTRATION; PERINEURAL at 22:27

## 2022-05-06 RX ADMIN — PROPOFOL 200 MG: 10 INJECTION, EMULSION INTRAVENOUS at 22:27

## 2022-05-06 RX ADMIN — ONDANSETRON 4 MG: 2 INJECTION INTRAMUSCULAR; INTRAVENOUS at 23:07

## 2022-05-06 RX ADMIN — DEXAMETHASONE SODIUM PHOSPHATE 8 MG: 4 INJECTION, SOLUTION INTRA-ARTICULAR; INTRALESIONAL; INTRAMUSCULAR; INTRAVENOUS; SOFT TISSUE at 23:07

## 2022-05-06 RX ADMIN — PROPOFOL 30 MG: 10 INJECTION, EMULSION INTRAVENOUS at 23:56

## 2022-05-06 RX ADMIN — SODIUM CHLORIDE, POTASSIUM CHLORIDE, SODIUM LACTATE AND CALCIUM CHLORIDE: 600; 310; 30; 20 INJECTION, SOLUTION INTRAVENOUS at 22:16

## 2022-05-06 RX ADMIN — FENTANYL CITRATE 50 MCG: 50 INJECTION, SOLUTION INTRAMUSCULAR; INTRAVENOUS at 23:43

## 2022-05-06 RX ADMIN — FENTANYL CITRATE 100 MCG: 50 INJECTION, SOLUTION INTRAMUSCULAR; INTRAVENOUS at 22:27

## 2022-05-06 RX ADMIN — FENTANYL CITRATE 50 MCG: 50 INJECTION, SOLUTION INTRAMUSCULAR; INTRAVENOUS at 22:43

## 2022-05-06 RX ADMIN — ROCURONIUM BROMIDE 10 MG: 50 INJECTION, SOLUTION INTRAVENOUS at 23:11

## 2022-05-06 RX ADMIN — SODIUM CHLORIDE, POTASSIUM CHLORIDE, SODIUM LACTATE AND CALCIUM CHLORIDE: 600; 310; 30; 20 INJECTION, SOLUTION INTRAVENOUS at 18:34

## 2022-05-06 RX ADMIN — MIDAZOLAM 2 MG: 1 INJECTION INTRAMUSCULAR; INTRAVENOUS at 22:21

## 2022-05-06 RX ADMIN — Medication 2 G: at 23:18

## 2022-05-06 RX ADMIN — ROCURONIUM BROMIDE 50 MG: 50 INJECTION, SOLUTION INTRAVENOUS at 22:27

## 2022-05-06 RX ADMIN — HYDROMORPHONE HYDROCHLORIDE 0.5 MG: 1 INJECTION, SOLUTION INTRAMUSCULAR; INTRAVENOUS; SUBCUTANEOUS at 23:16

## 2022-05-06 RX ADMIN — PROPOFOL 30 MG: 10 INJECTION, EMULSION INTRAVENOUS at 22:43

## 2022-05-06 ASSESSMENT — ACTIVITIES OF DAILY LIVING (ADL)
ADLS_ACUITY_SCORE: 3
ADLS_ACUITY_SCORE: 12

## 2022-05-06 ASSESSMENT — LIFESTYLE VARIABLES: TOBACCO_USE: 1

## 2022-05-06 ASSESSMENT — ENCOUNTER SYMPTOMS
FEVER: 0
WOUND: 1

## 2022-05-06 NOTE — H&P
"[facility]       NEUROSURGERY CONSULTATION H&P NOTE      HPI:  Jossue \"Elmo\" Melissa is a 52 yo male with PMH of trigeminal neuralgia who is 1 month s/p right microvascular decompression on 04/07/2022. He reports that starting yesterday morning after showering he noticed drainage from the inferior part of his right retroauricular surgical incision. He denies any other fevers, chills, nausea, vomiting, constipation/diarrhea, chest pain, shortness of breath, headaches, confusion, photophobia worsening neck stiffness, LOC, weakness, or numbness/tingling/paresthesias. Up until yesterday, his recovery had been progressing appropriately, and he hasn't had any of his pre-operative trigeminal neuralgia symptoms since his procedure. Of note, the last time he ate was 10AM this morning. Regarding his trigeminal symptoms, he reports since surgery he has not had any of his usual right sided V3 electrical pain. He has been slowly weaning his [place] related medications.     PAST MEDICAL HISTORY:   Past Medical History:   Diagnosis Date     Herpes zoster without mention of complication 1991       PAST SURGICAL HISTORY:   Past Surgical History:   Procedure Laterality Date     CRANIOTOMY, DECOMPRESS NEUROVASCULAR, COMBINED Right 4/7/2022    Procedure: Right microvascular decompression  Latex Free;  Surgeon: Grey Simeon MD;  Location: [facility] OR     SURGICAL HISTORY OF -   [birth year]    right inguinal hernioplasty     SURGICAL HISTORY OF -   11/16/93    lysis of two penile adhesions and cauterization of penile condyloma.       FAMILY HISTORY:   Family History   Problem Relation Age of Onset     Hypertension Father      Hypertension Brother      C.A.D. Maternal Grandmother         40's     C.A.D. Maternal Aunt         40's     C.A.D. Maternal Uncle         40's     Anesthesia Reaction No family hx of      Deep Vein Thrombosis (DVT) No family hx of        SOCIAL HISTORY:   Social History "     Tobacco Use     Smoking status: Current Every Day Smoker     Packs/day: 0.50     Years: 15.00     Pack years: 7.50     Types: Cigarettes     Smokeless tobacco: Former User     Types: Chew   Substance Use Topics     Alcohol use: Yes     Comment: rare       MEDICATIONS:  Current Outpatient Medications   Medication Sig Dispense Refill     acetaminophen (TYLENOL) 650 MG CR tablet Take 1,300 mg by mouth every 8 hours as needed for mild pain or fever 3 TIMES DAILY       eletriptan (RELPAX) 40 MG tablet Take 1 tablet by mouth. repeat after 2 hours if needed. 24 tablet 11     gabapentin (NEURONTIN) 600 MG tablet Take 1 tablet (600 mg) by mouth 3 times daily 90 tablet 0     hydrochlorothiazide (HYDRODIURIL) 25 MG tablet Take 1 tablet (25 mg) by mouth daily 30 tablet 10     HYDROcodone-acetaminophen (NORCO) 5-325 MG tablet Take 1 tablet by mouth every 6 hours as needed for severe pain       losartan (COZAAR) 100 MG tablet Take 1 tablet (100 mg) by mouth daily (Patient taking differently: Take 100 mg by mouth At Bedtime) 90 tablet 3     OXcarbazepine (TRILEPTAL) 300 MG tablet Take 2 tabs in the morning and 1 tab at night. 270 tablet 0     oxyCODONE (ROXICODONE) 5 MG tablet Take 1 tablet (5 mg) by mouth 2 times daily as needed for pain (Patient taking differently: Take 5 mg by mouth every 6 hours as needed for pain or breakthrough pain) 8 tablet 0     simvastatin (ZOCOR) 40 MG tablet Take 1 tablet (40 mg) by mouth At Bedtime 90 tablet 3     tiZANidine (ZANAFLEX) 4 MG capsule Take 1 capsule (4 mg) by mouth 3 times daily 90 capsule 2       Allergies:  Allergies   Allergen Reactions     Nkda [No Known Drug Allergies]        ROS: 10 point ROS of systems including Constitutional, Eyes, Respiratory, Cardiovascular, Gastroenterology, Genitourinary, Integumentary, Muscularskeletal, Psychiatric were all negative except for pertinent positives noted in my HPI.    Physical exam:   Blood pressure 137/88, pulse 77, temperature 99.6  F  "(37.6  C), temperature source Oral, resp. rate 12, height 1.803 m (5' 11\"), weight 99.1 kg (218 lb 7.6 oz), SpO2 98 %.  CV: RRR, extremities are warm and well perfused  PULM: breathing comfortably on room air with no accessory muscle use  ABD: soft, non-distended  DERM: Retroauricular surgical incision on the right with expressable purulence from inferior part of the incision without erythema or warmth.        NEUROLOGIC:  -- Awake; Alert; oriented x 3  -- Follows commands briskly  -- +repetition, calculation, and naming  -- Speech fluent, spontaneous. No aphasia or dysarthria.  -- no gaze preference. No apparent hemineglect.  Cranial Nerves:  -- visual fields full to confrontation, PERRL 3-2mm bilat and brisk, extraocular movements intact  -- face symmetrical, tongue midline  -- sensory V1-V3 intact bilaterally  -- palate elevates symmetrically, uvula midline  -- hearing grossly intact bilat  -- Trapezii 5/5 strength bilat symmetric  -- Cerebellar: Finger nose finger without dysmetria, intact rapid alternating motions bilaterally    Motor:  Normal bulk / tone; no tremor, rigidity, or bradykinesia.  No muscle wasting or fasciculations  No Pronator Drift     Delt Bi Tri Hand Flexion/  Extension Iliopsoas Quadriceps Hamstrings Tibialis Anterior Gastroc    C5 C6 C7 C8/T1 L2 L3 L4-S1 L4 S1   R 5 5 5 5 5 5 5 5 5   L 5 5 5 5 5 5 5 5 5   Sensory:  intact to LT x 4 extremities     Gait: Normal tandem gait.       IMAGING:  None    LABS:   Lab Results   Component Value Date    WBC 12.0 04/08/2022    WBC 10.2 12/13/2011     Lab Results   Component Value Date    RBC 4.16 04/08/2022    RBC 4.98 12/13/2011     Lab Results   Component Value Date    HGB 13.8 04/08/2022    HGB 15.8 12/13/2011     Lab Results   Component Value Date    HCT 40.4 04/08/2022    HCT 47.1 12/13/2011     Lab Results   Component Value Date    MCV 97 04/08/2022    MCV 95 12/13/2011     Lab Results   Component Value Date    MCH 33.2 04/08/2022    MCH 31.7 " 12/13/2011     Lab Results   Component Value Date    MCHC 34.2 04/08/2022    MCHC 33.5 12/13/2011     Lab Results   Component Value Date    RDW 13.2 04/08/2022    RDW 12.8 12/13/2011     Lab Results   Component Value Date     04/08/2022     12/13/2011       Last Comprehensive Metabolic Panel:  Sodium   Date Value Ref Range Status   05/06/2022 137 133 - 144 mmol/L Final   01/04/2021 138 133 - 144 mmol/L Final     Potassium   Date Value Ref Range Status   05/06/2022 4.0 3.4 - 5.3 mmol/L Final   01/04/2021 3.8 3.4 - 5.3 mmol/L Final     Chloride   Date Value Ref Range Status   05/06/2022 103 94 - 109 mmol/L Final   01/04/2021 110 (H) 94 - 109 mmol/L Final     Carbon Dioxide   Date Value Ref Range Status   01/04/2021 25 20 - 32 mmol/L Final     Carbon Dioxide (CO2)   Date Value Ref Range Status   05/06/2022 28 20 - 32 mmol/L Final     Anion Gap   Date Value Ref Range Status   05/06/2022 6 3 - 14 mmol/L Final   01/04/2021 3 3 - 14 mmol/L Final     Glucose   Date Value Ref Range Status   05/06/2022 91 70 - 99 mg/dL Final   01/04/2021 81 70 - 99 mg/dL Final     Comment:     Non Fasting     Urea Nitrogen   Date Value Ref Range Status   05/06/2022 18 7 - 30 mg/dL Final   01/04/2021 14 7 - 30 mg/dL Final     Creatinine   Date Value Ref Range Status   05/06/2022 1.23 0.66 - 1.25 mg/dL Final   01/04/2021 1.15 0.66 - 1.25 mg/dL Final     GFR Estimate   Date Value Ref Range Status   05/06/2022 71 >60 mL/min/1.73m2 Final     Comment:     Effective December 21, 2021 eGFRcr in adults is calculated using the 2021 CKD-EPI creatinine equation which includes age and gender (Krystian et al., NEJM, DOI: 10.1056/KIGXsh0024843)   01/04/2021 73 >60 mL/min/[1.73_m2] Final     Comment:     Non  GFR Calc  Starting 12/18/2018, serum creatinine based estimated GFR (eGFR) will be   calculated using the Chronic Kidney Disease Epidemiology Collaboration   (CKD-EPI) equation.       Calcium   Date Value Ref Range Status    05/06/2022 9.6 8.5 - 10.1 mg/dL Final   01/04/2021 8.4 (L) 8.5 - 10.1 mg/dL Final       INR   Date Value Ref Range Status   05/06/2022 0.94 0.85 - 1.15 Final      aPTT   Date Value Ref Range Status   05/06/2022 28 22 - 38 Seconds Final        ASSESSMENT:  51 year old male w/ PMH of HTN, HLD, tobacco use, migraines, GERD, and trigeminal neuralgia 1 month s/p right microvascular decompression on 04/07/2022 now with one day of expressable purulent drainage from the inferior part of his retroauricular incision in the absence of any fevers, headache, neck stiffness, photophobia or leukocytosis. Diagnosis most likely superficial post-operative infection, although a deeper infection cannot be definitively rule out--will need surgical exploration.    RECOMMENDATIONS:  - Incision and drainage for post op infection with possible hardware removal, consented  - Intraoperative wound cultures  - Start empiric antibiotics once sample obtained and will narrow pending susceptibilities  - Labs: ESR, CRP, and blood cultures  - Continue PTA hydrochlorothiazide and simvastatin, PRN tizanidine, Hold losartan perioperatively - okay to restart on 05/08/2022    The patient was discussed with Dr. Katz, neurosurgery chief resident, and she agrees with the above.    Kermit Thomas, MS4  [employer]    Lio Mccall MD  Neurosurgery Resident, PGY-2      Please contact neurosurgery resident on call with questions.

## 2022-05-06 NOTE — TELEPHONE ENCOUNTER
Spoke with patient, Incision started to leak pus yesterday afternoon, was getting worse this morning.  Patient denies fever, no increase in pain, but is weeping fluid constantly.  Patient sent picture on My Chart.  Per Dr Simeon patient should come to [facility] ED today or tomorrow for possible wash out.      Patient will work on getting a ride and callback with update.    Patient has a ride and coming to [facility] ED now.  Dr Simeon aware.  Patient will stay NPO and can sip clear liquids.      Voices understanding.

## 2022-05-06 NOTE — ED TRIAGE NOTES
Pt states he had surgery 2 months ago and he has pus coming out of incision site. Denies pain or fevers. Pt states neuro surgery knows he is coming to ED.      Triage Assessment     Row Name 05/06/22 3331       Triage Assessment (Adult)    Airway WDL WDL       Respiratory WDL    Respiratory WDL WDL       Skin Circulation/Temperature WDL    Skin Circulation/Temperature WDL WDL       Cardiac WDL    Cardiac WDL WDL       Peripheral/Neurovascular WDL    Peripheral Neurovascular WDL WDL       Cognitive/Neuro/Behavioral WDL    Cognitive/Neuro/Behavioral WDL WDL

## 2022-05-06 NOTE — ED PROVIDER NOTES
ED Provider Note  [facility]      History     Chief Complaint   Patient presents with     Post-op Problem     HPI  Jossue Torres is a 51 year old male who is about 1 month out from a decompression surgery for trigeminal neuralgia and he is subsequently developed purulent drainage from the right retroauricular surgical wound.  Neurosurgery is aware his last meal was 10 AM the plan to take him to the OR for a washout.    Past Medical History  Past Medical History:   Diagnosis Date     Herpes zoster without mention of complication 1991     Past Surgical History:   Procedure Laterality Date     CRANIOTOMY, DECOMPRESS NEUROVASCULAR, COMBINED Right 4/7/2022    Procedure: Right microvascular decompression  Latex Free;  Surgeon: Grey Simeon MD;  Location: [facility] OR     SURGICAL HISTORY OF -   9/9/69    right inguinal hernioplasty     SURGICAL HISTORY OF -   11/16/93    lysis of two penile adhesions and cauterization of penile condyloma.     acetaminophen (TYLENOL) 650 MG CR tablet  eletriptan (RELPAX) 40 MG tablet  gabapentin (NEURONTIN) 600 MG tablet  hydrochlorothiazide (HYDRODIURIL) 25 MG tablet  HYDROcodone-acetaminophen (NORCO) 5-325 MG tablet  losartan (COZAAR) 100 MG tablet  OXcarbazepine (TRILEPTAL) 300 MG tablet  oxyCODONE (ROXICODONE) 5 MG tablet  simvastatin (ZOCOR) 40 MG tablet  tiZANidine (ZANAFLEX) 4 MG capsule      Allergies   Allergen Reactions     Nkda [No Known Drug Allergies]      Family History  Family History   Problem Relation Age of Onset     Hypertension Father      Hypertension Brother      C.A.D. Maternal Grandmother         40's     C.A.D. Maternal Aunt         40's     C.A.D. Maternal Uncle         40's     Anesthesia Reaction No family hx of      Deep Vein Thrombosis (DVT) No family hx of      Social History   Social History     Tobacco Use     Smoking status: Current Every Day Smoker     Packs/day: 0.50     Years: 15.00     Pack years: 7.50     Types:  "Cigarettes     Smokeless tobacco: Former User     Types: Chew   Vaping Use     Vaping Use: Never used   Substance Use Topics     Alcohol use: Yes     Comment: rare     Drug use: No      Past medical history, past surgical history, medications, allergies, family history, and social history were reviewed with the patient. No additional pertinent items.       Review of Systems   Constitutional: Negative for fever.   Skin: Positive for wound (purulent drainage from scalp wound).   All other systems reviewed and are negative.    A complete review of systems was performed with pertinent positives and negatives noted in the HPI, and all other systems negative.    Physical Exam   BP: 137/88  Pulse: 77  Temp: 99.6  F (37.6  C)  Resp: 12  Height: 180.3 cm (5' 11\")  Weight: 99.1 kg (218 lb 7.6 oz)  SpO2: 98 %  Physical Exam  Vitals and nursing note reviewed.   Constitutional:       General: He is not in acute distress.  HENT:      Head:        Comments: Purulent drainage from the inferior portion of the wound there is an area of fluctuance adjacent to the wound likely representing underlying pocket of purulence  Neurological:      General: No focal deficit present.      Mental Status: He is alert and oriented to person, place, and time.   Psychiatric:         Mood and Affect: Mood normal.         Behavior: Behavior normal.         ED Course      Procedures       Discussed with Neurosurgery the plan to do a washout in the OR.  Preoperative labs have been ordered.         Results for orders placed or performed during the hospital encounter of 05/06/22   [place] Draw     Status: None ()    Narrative    The following orders were created for panel order [place] Draw.  Procedure                               Abnormality         Status                     ---------                               -----------         ------                     Extra Red Top Tube[[id number]]                                                        "     Please view results for these tests on the individual orders.   CBC with platelets differential     Status: None ()    Narrative    The following orders were created for panel order CBC with platelets differential.  Procedure                               Abnormality         Status                     ---------                               -----------         ------                     CBC with platelets and d...[[id number]]                                                   Please view results for these tests on the individual orders.   ABO/Rh type and screen     Status: None ()    Narrative    The following orders were created for panel order ABO/Rh type and screen.  Procedure                               Abnormality         Status                     ---------                               -----------         ------                     Adult Type and Screen[[id number]]                                                         Please view results for these tests on the individual orders.     Medications   lactated ringers infusion (has no administration in time range)        Assessments & Plan (with Medical Decision Making)   51-year-old male who is about 1 month out from decompression surgery on the right side for trigeminal neuralgia.  In the last day or 2 has noted purulent drainage from the wound.  Neurosurgery is expecting him and the plan is to washout in the OR preoperative labs were ordered he is not septic they can do a culture intraoperatively.  I spoke to the resident and confirmed the plan and the patient is aware also.    I have reviewed the nursing notes. I have reviewed the findings, diagnosis, plan and need for follow up with the patient.    New Prescriptions    No medications on file       Final diagnoses:   Postoperative wound infection       --  Rafael Pineda MD  [facility] EMERGENCY DEPARTMENT  5/6/2022     Rafael Pineda MD  05/06/22 1822

## 2022-05-07 ENCOUNTER — APPOINTMENT (OUTPATIENT)
Dept: PHYSICAL THERAPY | Facility: CLINIC | Age: 52
End: 2022-05-07
Payer: COMMERCIAL

## 2022-05-07 LAB
ANION GAP SERPL CALCULATED.3IONS-SCNC: 8 MMOL/L (ref 3–14)
BUN SERPL-MCNC: 17 MG/DL (ref 7–30)
CALCIUM SERPL-MCNC: 9.2 MG/DL (ref 8.5–10.1)
CHLORIDE BLD-SCNC: 106 MMOL/L (ref 94–109)
CO2 SERPL-SCNC: 24 MMOL/L (ref 20–32)
CREAT SERPL-MCNC: 1.16 MG/DL (ref 0.66–1.25)
CRP SERPL-MCNC: 12 MG/L (ref 0–8)
ERYTHROCYTE [SEDIMENTATION RATE] IN BLOOD BY WESTERGREN METHOD: 10 MM/HR (ref 0–20)
GFR SERPL CREATININE-BSD FRML MDRD: 76 ML/MIN/1.73M2
GLUCOSE BLD-MCNC: 130 MG/DL (ref 70–99)
GLUCOSE BLDC GLUCOMTR-MCNC: 127 MG/DL (ref 70–99)
GRAM STAIN RESULT: ABNORMAL
GRAM STAIN RESULT: NORMAL
GRAM STAIN RESULT: NORMAL
MAGNESIUM SERPL-MCNC: 1.8 MG/DL (ref 1.6–2.3)
PHOSPHATE SERPL-MCNC: 2 MG/DL (ref 2.5–4.5)
POTASSIUM BLD-SCNC: 3.9 MMOL/L (ref 3.4–5.3)
SODIUM SERPL-SCNC: 138 MMOL/L (ref 133–144)

## 2022-05-07 PROCEDURE — 258N000003 HC RX IP 258 OP 636: Performed by: STUDENT IN AN ORGANIZED HEALTH CARE EDUCATION/TRAINING PROGRAM

## 2022-05-07 PROCEDURE — 86140 C-REACTIVE PROTEIN: CPT | Performed by: STUDENT IN AN ORGANIZED HEALTH CARE EDUCATION/TRAINING PROGRAM

## 2022-05-07 PROCEDURE — 258N000003 HC RX IP 258 OP 636: Performed by: NEUROLOGICAL SURGERY

## 2022-05-07 PROCEDURE — 36415 COLL VENOUS BLD VENIPUNCTURE: CPT | Performed by: STUDENT IN AN ORGANIZED HEALTH CARE EDUCATION/TRAINING PROGRAM

## 2022-05-07 PROCEDURE — 84100 ASSAY OF PHOSPHORUS: CPT | Performed by: NEUROLOGICAL SURGERY

## 2022-05-07 PROCEDURE — 250N000013 HC RX MED GY IP 250 OP 250 PS 637: Performed by: NEUROLOGICAL SURGERY

## 2022-05-07 PROCEDURE — 250N000013 HC RX MED GY IP 250 OP 250 PS 637: Performed by: STUDENT IN AN ORGANIZED HEALTH CARE EDUCATION/TRAINING PROGRAM

## 2022-05-07 PROCEDURE — 999N000111 HC STATISTIC OT IP EVAL DEFER

## 2022-05-07 PROCEDURE — 250N000011 HC RX IP 250 OP 636: Performed by: NEUROLOGICAL SURGERY

## 2022-05-07 PROCEDURE — 97530 THERAPEUTIC ACTIVITIES: CPT | Mod: GP | Performed by: PHYSICAL THERAPIST

## 2022-05-07 PROCEDURE — 250N000013 HC RX MED GY IP 250 OP 250 PS 637: Performed by: ANESTHESIOLOGY

## 2022-05-07 PROCEDURE — 83735 ASSAY OF MAGNESIUM: CPT | Performed by: STUDENT IN AN ORGANIZED HEALTH CARE EDUCATION/TRAINING PROGRAM

## 2022-05-07 PROCEDURE — 120N000002 HC R&B MED SURG/OB UMMC

## 2022-05-07 PROCEDURE — 97161 PT EVAL LOW COMPLEX 20 MIN: CPT | Mod: GP | Performed by: PHYSICAL THERAPIST

## 2022-05-07 PROCEDURE — 250N000011 HC RX IP 250 OP 636: Performed by: STUDENT IN AN ORGANIZED HEALTH CARE EDUCATION/TRAINING PROGRAM

## 2022-05-07 PROCEDURE — 80048 BASIC METABOLIC PNL TOTAL CA: CPT | Performed by: STUDENT IN AN ORGANIZED HEALTH CARE EDUCATION/TRAINING PROGRAM

## 2022-05-07 PROCEDURE — 999N000248 HC STATISTIC IV INSERT WITH US BY RN

## 2022-05-07 PROCEDURE — 250N000011 HC RX IP 250 OP 636: Performed by: ANESTHESIOLOGY

## 2022-05-07 PROCEDURE — 85652 RBC SED RATE AUTOMATED: CPT | Performed by: STUDENT IN AN ORGANIZED HEALTH CARE EDUCATION/TRAINING PROGRAM

## 2022-05-07 PROCEDURE — 250N000009 HC RX 250: Performed by: STUDENT IN AN ORGANIZED HEALTH CARE EDUCATION/TRAINING PROGRAM

## 2022-05-07 PROCEDURE — 97110 THERAPEUTIC EXERCISES: CPT | Mod: GP | Performed by: PHYSICAL THERAPIST

## 2022-05-07 RX ORDER — LABETALOL HYDROCHLORIDE 5 MG/ML
10-40 INJECTION, SOLUTION INTRAVENOUS EVERY 10 MIN PRN
Status: DISCONTINUED | OUTPATIENT
Start: 2022-05-07 | End: 2022-05-11 | Stop reason: HOSPADM

## 2022-05-07 RX ORDER — OXYCODONE HYDROCHLORIDE 5 MG/1
5 TABLET ORAL EVERY 4 HOURS PRN
Status: DISCONTINUED | OUTPATIENT
Start: 2022-05-07 | End: 2022-05-11 | Stop reason: HOSPADM

## 2022-05-07 RX ORDER — MAGNESIUM SULFATE HEPTAHYDRATE 40 MG/ML
2 INJECTION, SOLUTION INTRAVENOUS ONCE
Status: COMPLETED | OUTPATIENT
Start: 2022-05-07 | End: 2022-05-07

## 2022-05-07 RX ORDER — ONDANSETRON 2 MG/ML
4 INJECTION INTRAMUSCULAR; INTRAVENOUS EVERY 30 MIN PRN
Status: DISCONTINUED | OUTPATIENT
Start: 2022-05-07 | End: 2022-05-07 | Stop reason: HOSPADM

## 2022-05-07 RX ORDER — OXYCODONE HYDROCHLORIDE 5 MG/1
5 TABLET ORAL EVERY 4 HOURS PRN
Status: DISCONTINUED | OUTPATIENT
Start: 2022-05-07 | End: 2022-05-07 | Stop reason: HOSPADM

## 2022-05-07 RX ORDER — PROCHLORPERAZINE MALEATE 10 MG
10 TABLET ORAL EVERY 6 HOURS PRN
Status: DISCONTINUED | OUTPATIENT
Start: 2022-05-07 | End: 2022-05-11 | Stop reason: HOSPADM

## 2022-05-07 RX ORDER — HYDROMORPHONE HCL IN WATER/PF 6 MG/30 ML
0.2 PATIENT CONTROLLED ANALGESIA SYRINGE INTRAVENOUS EVERY 5 MIN PRN
Status: DISCONTINUED | OUTPATIENT
Start: 2022-05-07 | End: 2022-05-07 | Stop reason: HOSPADM

## 2022-05-07 RX ORDER — HYDROMORPHONE HCL IN WATER/PF 6 MG/30 ML
0.4 PATIENT CONTROLLED ANALGESIA SYRINGE INTRAVENOUS
Status: DISCONTINUED | OUTPATIENT
Start: 2022-05-07 | End: 2022-05-10

## 2022-05-07 RX ORDER — TIZANIDINE 2 MG/1
2 TABLET ORAL EVERY 6 HOURS PRN
Status: DISCONTINUED | OUTPATIENT
Start: 2022-05-07 | End: 2022-05-11 | Stop reason: HOSPADM

## 2022-05-07 RX ORDER — ALBUTEROL SULFATE 0.83 MG/ML
2.5 SOLUTION RESPIRATORY (INHALATION) ONCE
Status: DISCONTINUED | OUTPATIENT
Start: 2022-05-07 | End: 2022-05-07 | Stop reason: CLARIF

## 2022-05-07 RX ORDER — SIMVASTATIN 40 MG
40 TABLET ORAL AT BEDTIME
Status: DISCONTINUED | OUTPATIENT
Start: 2022-05-07 | End: 2022-05-11 | Stop reason: HOSPADM

## 2022-05-07 RX ORDER — GABAPENTIN 600 MG/1
600 TABLET ORAL 3 TIMES DAILY
Status: DISCONTINUED | OUTPATIENT
Start: 2022-05-07 | End: 2022-05-11 | Stop reason: HOSPADM

## 2022-05-07 RX ORDER — FENTANYL CITRATE 50 UG/ML
25 INJECTION, SOLUTION INTRAMUSCULAR; INTRAVENOUS EVERY 5 MIN PRN
Status: DISCONTINUED | OUTPATIENT
Start: 2022-05-07 | End: 2022-05-07 | Stop reason: HOSPADM

## 2022-05-07 RX ORDER — NALOXONE HYDROCHLORIDE 0.4 MG/ML
0.4 INJECTION, SOLUTION INTRAMUSCULAR; INTRAVENOUS; SUBCUTANEOUS
Status: DISCONTINUED | OUTPATIENT
Start: 2022-05-07 | End: 2022-05-11 | Stop reason: HOSPADM

## 2022-05-07 RX ORDER — ONDANSETRON 2 MG/ML
4 INJECTION INTRAMUSCULAR; INTRAVENOUS EVERY 6 HOURS PRN
Status: DISCONTINUED | OUTPATIENT
Start: 2022-05-07 | End: 2022-05-07 | Stop reason: DRUGHIGH

## 2022-05-07 RX ORDER — PROCHLORPERAZINE MALEATE 5 MG
10 TABLET ORAL EVERY 6 HOURS PRN
Status: DISCONTINUED | OUTPATIENT
Start: 2022-05-07 | End: 2022-05-07

## 2022-05-07 RX ORDER — LIDOCAINE 40 MG/G
CREAM TOPICAL
Status: DISCONTINUED | OUTPATIENT
Start: 2022-05-07 | End: 2022-05-11 | Stop reason: HOSPADM

## 2022-05-07 RX ORDER — ONDANSETRON 4 MG/1
4 TABLET, ORALLY DISINTEGRATING ORAL EVERY 6 HOURS PRN
Status: DISCONTINUED | OUTPATIENT
Start: 2022-05-07 | End: 2022-05-07 | Stop reason: DRUGHIGH

## 2022-05-07 RX ORDER — HYDROCHLOROTHIAZIDE 25 MG/1
25 TABLET ORAL DAILY
Status: DISCONTINUED | OUTPATIENT
Start: 2022-05-07 | End: 2022-05-11 | Stop reason: HOSPADM

## 2022-05-07 RX ORDER — OXCARBAZEPINE 150 MG/1
600 TABLET, FILM COATED ORAL DAILY
Status: DISCONTINUED | OUTPATIENT
Start: 2022-05-07 | End: 2022-05-11 | Stop reason: HOSPADM

## 2022-05-07 RX ORDER — AMOXICILLIN 250 MG
1 CAPSULE ORAL 2 TIMES DAILY
Status: DISCONTINUED | OUTPATIENT
Start: 2022-05-07 | End: 2022-05-11 | Stop reason: HOSPADM

## 2022-05-07 RX ORDER — NALOXONE HYDROCHLORIDE 0.4 MG/ML
0.2 INJECTION, SOLUTION INTRAMUSCULAR; INTRAVENOUS; SUBCUTANEOUS
Status: DISCONTINUED | OUTPATIENT
Start: 2022-05-07 | End: 2022-05-11 | Stop reason: HOSPADM

## 2022-05-07 RX ORDER — LIDOCAINE 40 MG/G
CREAM TOPICAL
Status: DISCONTINUED | OUTPATIENT
Start: 2022-05-07 | End: 2022-05-07 | Stop reason: CLARIF

## 2022-05-07 RX ORDER — HYDROMORPHONE HCL IN WATER/PF 6 MG/30 ML
0.2 PATIENT CONTROLLED ANALGESIA SYRINGE INTRAVENOUS
Status: DISCONTINUED | OUTPATIENT
Start: 2022-05-07 | End: 2022-05-10

## 2022-05-07 RX ORDER — ELETRIPTAN HYDROBROMIDE 40 MG/1
40 TABLET, FILM COATED ORAL
Status: COMPLETED | OUTPATIENT
Start: 2022-05-07 | End: 2022-05-07

## 2022-05-07 RX ORDER — OXYCODONE HYDROCHLORIDE 10 MG/1
10 TABLET ORAL EVERY 4 HOURS PRN
Status: DISCONTINUED | OUTPATIENT
Start: 2022-05-07 | End: 2022-05-11 | Stop reason: HOSPADM

## 2022-05-07 RX ORDER — PROCHLORPERAZINE 25 MG
25 SUPPOSITORY, RECTAL RECTAL EVERY 12 HOURS PRN
Status: DISCONTINUED | OUTPATIENT
Start: 2022-05-07 | End: 2022-05-11 | Stop reason: HOSPADM

## 2022-05-07 RX ORDER — LEVETIRACETAM 500 MG/1
1000 TABLET ORAL 2 TIMES DAILY
Status: DISCONTINUED | OUTPATIENT
Start: 2022-05-07 | End: 2022-05-11 | Stop reason: HOSPADM

## 2022-05-07 RX ORDER — HYDRALAZINE HYDROCHLORIDE 20 MG/ML
10-20 INJECTION INTRAMUSCULAR; INTRAVENOUS EVERY 30 MIN PRN
Status: DISCONTINUED | OUTPATIENT
Start: 2022-05-07 | End: 2022-05-11 | Stop reason: HOSPADM

## 2022-05-07 RX ORDER — ONDANSETRON 4 MG/1
4 TABLET, ORALLY DISINTEGRATING ORAL EVERY 30 MIN PRN
Status: DISCONTINUED | OUTPATIENT
Start: 2022-05-07 | End: 2022-05-07 | Stop reason: HOSPADM

## 2022-05-07 RX ORDER — OXCARBAZEPINE 150 MG/1
300 TABLET, FILM COATED ORAL AT BEDTIME
Status: DISCONTINUED | OUTPATIENT
Start: 2022-05-07 | End: 2022-05-11 | Stop reason: HOSPADM

## 2022-05-07 RX ORDER — ONDANSETRON 2 MG/ML
4-8 INJECTION INTRAMUSCULAR; INTRAVENOUS EVERY 6 HOURS PRN
Status: DISCONTINUED | OUTPATIENT
Start: 2022-05-07 | End: 2022-05-11 | Stop reason: HOSPADM

## 2022-05-07 RX ORDER — BISACODYL 10 MG
10 SUPPOSITORY, RECTAL RECTAL DAILY PRN
Status: DISCONTINUED | OUTPATIENT
Start: 2022-05-07 | End: 2022-05-11 | Stop reason: HOSPADM

## 2022-05-07 RX ORDER — CYCLOBENZAPRINE HCL 10 MG
10 TABLET ORAL 3 TIMES DAILY
Status: DISCONTINUED | OUTPATIENT
Start: 2022-05-07 | End: 2022-05-07

## 2022-05-07 RX ORDER — SODIUM CHLORIDE 9 MG/ML
75 INJECTION, SOLUTION INTRAVENOUS CONTINUOUS
Status: ACTIVE | OUTPATIENT
Start: 2022-05-07 | End: 2022-05-07

## 2022-05-07 RX ORDER — ONDANSETRON 4 MG/1
4-8 TABLET, ORALLY DISINTEGRATING ORAL EVERY 6 HOURS PRN
Status: DISCONTINUED | OUTPATIENT
Start: 2022-05-07 | End: 2022-05-11 | Stop reason: HOSPADM

## 2022-05-07 RX ORDER — ACETAMINOPHEN 325 MG/1
650 TABLET ORAL EVERY 4 HOURS PRN
Status: DISCONTINUED | OUTPATIENT
Start: 2022-05-07 | End: 2022-05-11 | Stop reason: HOSPADM

## 2022-05-07 RX ORDER — POLYETHYLENE GLYCOL 3350 17 G/17G
17 POWDER, FOR SOLUTION ORAL DAILY
Status: DISCONTINUED | OUTPATIENT
Start: 2022-05-08 | End: 2022-05-11 | Stop reason: HOSPADM

## 2022-05-07 RX ORDER — SODIUM CHLORIDE, SODIUM LACTATE, POTASSIUM CHLORIDE, CALCIUM CHLORIDE 600; 310; 30; 20 MG/100ML; MG/100ML; MG/100ML; MG/100ML
INJECTION, SOLUTION INTRAVENOUS CONTINUOUS
Status: DISCONTINUED | OUTPATIENT
Start: 2022-05-07 | End: 2022-05-07 | Stop reason: HOSPADM

## 2022-05-07 RX ADMIN — HYDRALAZINE HYDROCHLORIDE 10 MG: 20 INJECTION INTRAMUSCULAR; INTRAVENOUS at 01:11

## 2022-05-07 RX ADMIN — PROPOFOL 30 MG: 10 INJECTION, EMULSION INTRAVENOUS at 00:07

## 2022-05-07 RX ADMIN — HYDROMORPHONE HYDROCHLORIDE 0.2 MG: 0.2 INJECTION, SOLUTION INTRAMUSCULAR; INTRAVENOUS; SUBCUTANEOUS at 01:20

## 2022-05-07 RX ADMIN — TIZANIDINE 2 MG: 2 TABLET ORAL at 19:33

## 2022-05-07 RX ADMIN — OXYCODONE HYDROCHLORIDE 10 MG: 10 TABLET ORAL at 16:40

## 2022-05-07 RX ADMIN — GABAPENTIN 600 MG: 600 TABLET, FILM COATED ORAL at 14:50

## 2022-05-07 RX ADMIN — POTASSIUM & SODIUM PHOSPHATES POWDER PACK 280-160-250 MG 1 PACKET: 280-160-250 PACK at 16:32

## 2022-05-07 RX ADMIN — VANCOMYCIN HYDROCHLORIDE 2500 MG: 1 INJECTION, POWDER, LYOPHILIZED, FOR SOLUTION INTRAVENOUS at 04:30

## 2022-05-07 RX ADMIN — POTASSIUM & SODIUM PHOSPHATES POWDER PACK 280-160-250 MG 1 PACKET: 280-160-250 PACK at 12:32

## 2022-05-07 RX ADMIN — VANCOMYCIN HYDROCHLORIDE 2000 MG: 1 INJECTION, POWDER, LYOPHILIZED, FOR SOLUTION INTRAVENOUS at 16:32

## 2022-05-07 RX ADMIN — HYDROMORPHONE HYDROCHLORIDE 0.4 MG: 0.2 INJECTION, SOLUTION INTRAMUSCULAR; INTRAVENOUS; SUBCUTANEOUS at 06:41

## 2022-05-07 RX ADMIN — ACETAMINOPHEN 650 MG: 325 TABLET ORAL at 14:59

## 2022-05-07 RX ADMIN — FENTANYL CITRATE 25 MCG: 50 INJECTION, SOLUTION INTRAMUSCULAR; INTRAVENOUS at 01:09

## 2022-05-07 RX ADMIN — HYDROCHLOROTHIAZIDE 25 MG: 25 TABLET ORAL at 08:06

## 2022-05-07 RX ADMIN — FENTANYL CITRATE 25 MCG: 50 INJECTION, SOLUTION INTRAMUSCULAR; INTRAVENOUS at 01:15

## 2022-05-07 RX ADMIN — SODIUM CHLORIDE 75 ML/HR: 9 INJECTION, SOLUTION INTRAVENOUS at 01:28

## 2022-05-07 RX ADMIN — GABAPENTIN 600 MG: 600 TABLET, FILM COATED ORAL at 19:23

## 2022-05-07 RX ADMIN — HYDROMORPHONE HYDROCHLORIDE 0.2 MG: 0.2 INJECTION, SOLUTION INTRAMUSCULAR; INTRAVENOUS; SUBCUTANEOUS at 04:04

## 2022-05-07 RX ADMIN — HYDROMORPHONE HYDROCHLORIDE 0.4 MG: 0.2 INJECTION, SOLUTION INTRAMUSCULAR; INTRAVENOUS; SUBCUTANEOUS at 14:59

## 2022-05-07 RX ADMIN — FENTANYL CITRATE 25 MCG: 50 INJECTION, SOLUTION INTRAMUSCULAR; INTRAVENOUS at 00:52

## 2022-05-07 RX ADMIN — POTASSIUM & SODIUM PHOSPHATES POWDER PACK 280-160-250 MG 1 PACKET: 280-160-250 PACK at 08:06

## 2022-05-07 RX ADMIN — GABAPENTIN 600 MG: 600 TABLET, FILM COATED ORAL at 08:05

## 2022-05-07 RX ADMIN — HYDROMORPHONE HYDROCHLORIDE 0.4 MG: 0.2 INJECTION, SOLUTION INTRAMUSCULAR; INTRAVENOUS; SUBCUTANEOUS at 09:49

## 2022-05-07 RX ADMIN — FENTANYL CITRATE 25 MCG: 50 INJECTION, SOLUTION INTRAMUSCULAR; INTRAVENOUS at 01:00

## 2022-05-07 RX ADMIN — HYDROMORPHONE HYDROCHLORIDE 0.2 MG: 0.2 INJECTION, SOLUTION INTRAMUSCULAR; INTRAVENOUS; SUBCUTANEOUS at 04:30

## 2022-05-07 RX ADMIN — OXYCODONE HYDROCHLORIDE 5 MG: 5 TABLET ORAL at 01:31

## 2022-05-07 RX ADMIN — OXYCODONE HYDROCHLORIDE 5 MG: 5 TABLET ORAL at 04:05

## 2022-05-07 RX ADMIN — HYDROMORPHONE HYDROCHLORIDE 0.4 MG: 0.2 INJECTION, SOLUTION INTRAMUSCULAR; INTRAVENOUS; SUBCUTANEOUS at 19:22

## 2022-05-07 RX ADMIN — LEVETIRACETAM 1000 MG: 500 TABLET, FILM COATED ORAL at 08:05

## 2022-05-07 RX ADMIN — OXCARBAZEPINE 300 MG: 150 TABLET, FILM COATED ORAL at 23:48

## 2022-05-07 RX ADMIN — OXYCODONE HYDROCHLORIDE 10 MG: 10 TABLET ORAL at 21:07

## 2022-05-07 RX ADMIN — ACETAMINOPHEN 650 MG: 325 TABLET ORAL at 01:32

## 2022-05-07 RX ADMIN — CEFEPIME HYDROCHLORIDE 2 G: 2 INJECTION, POWDER, FOR SOLUTION INTRAVENOUS at 09:53

## 2022-05-07 RX ADMIN — ONDANSETRON 4 MG: 2 INJECTION INTRAMUSCULAR; INTRAVENOUS at 09:49

## 2022-05-07 RX ADMIN — HYDRALAZINE HYDROCHLORIDE 20 MG: 20 INJECTION INTRAMUSCULAR; INTRAVENOUS at 01:55

## 2022-05-07 RX ADMIN — CEFEPIME HYDROCHLORIDE 2 G: 2 INJECTION, POWDER, FOR SOLUTION INTRAVENOUS at 19:22

## 2022-05-07 RX ADMIN — OXYCODONE HYDROCHLORIDE 10 MG: 10 TABLET ORAL at 08:05

## 2022-05-07 RX ADMIN — HYDROMORPHONE HYDROCHLORIDE 0.2 MG: 0.2 INJECTION, SOLUTION INTRAMUSCULAR; INTRAVENOUS; SUBCUTANEOUS at 02:26

## 2022-05-07 RX ADMIN — SUGAMMADEX 200 MG: 100 INJECTION, SOLUTION INTRAVENOUS at 00:14

## 2022-05-07 RX ADMIN — MAGNESIUM SULFATE HEPTAHYDRATE 2 G: 40 INJECTION, SOLUTION INTRAVENOUS at 08:05

## 2022-05-07 RX ADMIN — LEVETIRACETAM 1000 MG: 500 TABLET, FILM COATED ORAL at 19:23

## 2022-05-07 RX ADMIN — ELETRIPTAN HYDROBROMIDE 40 MG: 40 TABLET, FILM COATED ORAL at 17:35

## 2022-05-07 RX ADMIN — HYDROMORPHONE HYDROCHLORIDE 0.5 MG: 1 INJECTION, SOLUTION INTRAMUSCULAR; INTRAVENOUS; SUBCUTANEOUS at 00:30

## 2022-05-07 RX ADMIN — SIMVASTATIN 40 MG: 40 TABLET, FILM COATED ORAL at 21:07

## 2022-05-07 RX ADMIN — PROPOFOL 30 MG: 10 INJECTION, EMULSION INTRAVENOUS at 00:01

## 2022-05-07 RX ADMIN — OXCARBAZEPINE 600 MG: 600 TABLET, FILM COATED ORAL at 08:06

## 2022-05-07 RX ADMIN — ONDANSETRON 4 MG: 2 INJECTION INTRAMUSCULAR; INTRAVENOUS at 04:37

## 2022-05-07 RX ADMIN — ACETAMINOPHEN 650 MG: 325 TABLET ORAL at 19:33

## 2022-05-07 RX ADMIN — CEFEPIME HYDROCHLORIDE 2 G: 2 INJECTION, POWDER, FOR SOLUTION INTRAVENOUS at 02:27

## 2022-05-07 RX ADMIN — OXYCODONE HYDROCHLORIDE 10 MG: 10 TABLET ORAL at 12:35

## 2022-05-07 RX ADMIN — HYDROMORPHONE HYDROCHLORIDE 0.4 MG: 0.2 INJECTION, SOLUTION INTRAMUSCULAR; INTRAVENOUS; SUBCUTANEOUS at 23:50

## 2022-05-07 ASSESSMENT — ACTIVITIES OF DAILY LIVING (ADL)
ADLS_ACUITY_SCORE: 7
ADLS_ACUITY_SCORE: 7
ADLS_ACUITY_SCORE: 5
ADLS_ACUITY_SCORE: 7
ADLS_ACUITY_SCORE: 5
ADLS_ACUITY_SCORE: 3
ADLS_ACUITY_SCORE: 7
ADLS_ACUITY_SCORE: 5
ADLS_ACUITY_SCORE: 7
ADLS_ACUITY_SCORE: 5
ADLS_ACUITY_SCORE: 7
ADLS_ACUITY_SCORE: 5

## 2022-05-07 ASSESSMENT — VISUAL ACUITY
OU: NORMAL ACUITY

## 2022-05-07 NOTE — PLAN OF CARE
Major Shift Events: No acute changes. Arrived from PACU around 0200. Passed swallow study.     Pt A&Ox4. Neuros intact. Throbbing pain at incision site. Prn oxy and dilaudid given. NSR. SBP goal <140 met. Afebrile. On RA. Advance diet as tolerated. Voids spontaneously. 3 head incisions behind R ear with minimal drainage.     Plan: Continue neuro checks q1h. Monitor VS and labs. Notify team of any changes. For vital signs and complete assessments, please see documentation flowsheets.      Plan of Care Reviewed With: patient     Overall Patient Progress: improving

## 2022-05-07 NOTE — PLAN OF CARE
OT/4A: DEFER. OT orders received and appreciated. Per discussion w/ PT and chart review, pt mobilizing well today and w/o any concerns w/ I/ADLs. Family to A prn after discharge. PT to follow during IP stay to prevent deconditioning and progress strength/endurance. OT to sign off at this time. Please consult again if new needs arise.

## 2022-05-07 NOTE — PLAN OF CARE
Goal Outcome Evaluation:    Plan of Care Reviewed With: patient     Overall Patient Progress: improving    Outcome Evaluation: Pt adequate for transferring to .    D/I: Pt alert and oriented x 4.. c/o right head incision pain t/o day. Requested PRN oxycodone and dilaudid with some relief.  Pt did not want the flexeril. Tolerated regular diet w/o nausea. Pt up and ambulated to bathroom multiple times w/o any assistance.   A: VSS. On room air. Head dressing (incision drainage) marked this am and no drainage extension noted.   P: Report given to 6A nurse. All belongings sent including cellphone.

## 2022-05-07 NOTE — PHARMACY-ADMISSION MEDICATION HISTORY
Admission Medication History Completed by Pharmacy    See [facility] Admission Navigator for allergy information, preferred outpatient pharmacy, prior to admission medications and immunization status.     Medication History Sources: Chart Review and Surescripts    Changes made to PTA medication list (reason):    Added: None    Deleted: Hydrocodone/APAP    Changed: None    Additional Information: None    Medication Sig Last Dose   acetaminophen (TYLENOL) 650 MG CR tablet Take 1,300 mg by mouth every 8 hours as needed for mild pain or fever. 5/6/2022 at 1200   eletriptan (RELPAX) 40 MG tablet Take 1 tablet by mouth at onset of headache - may repeat after 2 hours if needed. Past Month   gabapentin (NEURONTIN) 600 MG tablet Take 1 tablet (600 mg) by mouth 3 times daily 5/6/2022 at 1500   hydrochlorothiazide (HYDRODIURIL) 25 MG tablet Take 1 tablet (25 mg) by mouth daily 5/6/2022 at 0500   losartan (COZAAR) 100 MG tablet Take 1 tablet (100 mg) by mouth daily. 5/5/2022 at 2100   OXcarbazepine (TRILEPTAL) 300 MG tablet Take 2 tabs by mouth in the morning and 1 tab by mouth at night. 5/6/2022 at 0500   oxyCODONE (ROXICODONE) 5 MG tablet Take 5 mg by mouth every 6 hours as needed for pain.  Past Week   simvastatin (ZOCOR) 40 MG tablet Take 1 tablet (40 mg) by mouth At Bedtime 5/5/2022 at 2100   tiZANidine (ZANAFLEX) 4 MG capsule Take 1 capsule (4 mg) by mouth 3 times daily 5/6/2022 at 1500       Date completed: 05/07/22    Medication history completed by: Shae Case RP

## 2022-05-07 NOTE — BRIEF OP NOTE
[facility]    Brief Operative Note    Pre-operative diagnosis: Wound infection [T14.8XXA, L08.9]  Mastoid abscess, right [H70.001]  Post-operative diagnosis Same as pre-operative diagnosis    Procedure: Procedure(s):  Retro-Mastoid wound washout  Surgeon: Surgeon(s) and Role:     * Grey Simeon MD - Primary     * Maria Esther Katz MD - Resident - Assisting  Anesthesia: General   Estimated Blood Loss: 10 ml    Drains: None  Specimens:   ID Type Source Tests Collected by Time Destination   A : Right Wound In Retro-Mastoid Biopsy Ear, Mastoid, Right ANAEROBIC BACTERIAL CULTURE ROUTINE, GRAM STAIN, FUNGAL OR YEAST CULTURE ROUTINE, AEROBIC BACTERIAL CULTURE ROUTINE Grey Simeon MD 5/6/2022 11:20 PM    B : Deeper Retro-Mastoid Wound Washout Biopsy Ear, Mastoid, Right ANAEROBIC BACTERIAL CULTURE ROUTINE, GRAM STAIN, FUNGAL OR YEAST CULTURE ROUTINE, AEROBIC BACTERIAL CULTURE ROUTINE Grey Simeon MD 5/6/2022 11:36 PM      Findings:   None.  Complications: None.  Implants:   Implant Name Type Inv. Item Serial No.  Lot No. LRB No. Used Action   IMP MESH SYN MALLEABLE [id number] LOW  TI - [id number] Mesh IMP MESH SYN MALLEABLE [id number] LOW  TI  SYNTHES-STRATEC N/A Right 1 Explanted   IMP SCR SYN MATRIX LOW PRO [id number] SELF DRILL [id number] - [id number] Metallic Hardware/[place] IMP SCR SYN MATRIX LOW PRO 1.5X04MM SELF DRILL [id number]  [employer]Class-STRATEC N/A Right 14 Explanted

## 2022-05-07 NOTE — PROGRESS NOTES
[facility], [place]   Neurosurgery Progress Note:    Assessment: Jossue Torres is a 51 year old male with history of right V3 trigeminal neuralgia s/p right retrosigmoid microvascular decompression of CN V who presented to the [facility] ED with purulent discharge from his wound on 5/6.  He was taken directly from the ED to the OR for wound culture and washout.  Postoperatively, he was admitted to the  Neuro ICU and has remained clinically stable.  He continues on broad-spectrum antibiotic coverage.    Plan:  - Serial neuro exams  - Pain control  - HOB > 30 degrees  - Advance diet as tolerated  - Monitor right postauricular incision  - Continue vancomycin and Zosyn  - Consult Infectious Disease  - Bowel regimen  - PRN antiemetics  - IVF until taking adequate PO  - PT/OT  - SCDs for DVT proph    Greatly appreciate the help from PT/OT in caring for Jossue Torres    -----------------------------------  Quang Dietrich MD  Neurosurgery PGY-3    Interval History/Subjective: OR as above.  Pain at incision site overnight; Flexeril added.    Objective:   Temp:  [99.6  F (37.6  C)] 99.6  F (37.6  C)  Pulse:  [77] 77  Resp:  [12] 12  BP: (137)/(88) 137/88  SpO2:  [98 %] 98 %  No intake/output data recorded.    Gen: Appears comfortable, NAD  Wound: Incision, clean, dry, intact without strikethrough  Neurologic:  - Alert & Oriented to person, place, time, and situation  - Follows commands briskly  - Speech fluent, spontaneous. No aphasia or dysarthria.  - No gaze preference. No apparent hemineglect.  - PERRL, EOMI  - Strong brow raise, eye closure, and smile  - Face symmetric with sensation intact to light touch  - Trapezii and sternocleidomastoid muscles 5/5 bilaterally  - No pronator drift     Del Tr Bi WE WF Gr   R 5 5 5 5 5 5   L 5 5 5 5 5 5    HF KE KF DF PF EHL   R 5 5 5 5 5 5   L 5 5 5 5 5 5     Sensation intact and symmetric to light touch throughout    LABS  Recent Labs   Lab Test 05/06/22  0190  04/08/22  0413 12/09/21  1441   WBC 9.7 12.0* 8.4   HGB 14.9 13.8 16.0   MCV 98 97 95    229 247       Recent Labs   Lab Test 05/06/22  2201 05/06/22  1819 04/08/22  0413 04/07/22  1507 04/07/22  0624 02/11/22  1625   NA  --  137 139  --   --  139   POTASSIUM  --  4.0 3.5 3.8  --  3.9   CHLORIDE  --  103 109  --   --  107   CO2  --  28 24  --   --  28   BUN  --  18 16  --   --  14   CR  --  1.23 1.13 1.16  --  1.14   ANIONGAP  --  6 6  --   --  4   ANN MARIE  --  9.6 8.3*  --   --  9.1   * 91 124*  --    < > 79    < > = values in this interval not displayed.       IMAGING  No results found for this or any previous visit (from the past 24 hour(s)).

## 2022-05-07 NOTE — ANESTHESIA PREPROCEDURE EVALUATION
Anesthesia Pre-Procedure Evaluation    Patient: Jossue Torres   MRN: [id number] : 1970        Procedure : Procedure(s):  Retro-Mastoid wound washout          Past Medical History:   Diagnosis Date     Herpes zoster without mention of complication       Past Surgical History:   Procedure Laterality Date     CRANIOTOMY, DECOMPRESS NEUROVASCULAR, COMBINED Right 2022    Procedure: Right microvascular decompression  Latex Free;  Surgeon: Grey Simeon MD;  Location: [facility] OR     SURGICAL HISTORY OF -   69    right inguinal hernioplasty     SURGICAL HISTORY OF -   93    lysis of two penile adhesions and cauterization of penile condyloma.      Allergies   Allergen Reactions     Nkda [No Known Drug Allergies]       Social History     Tobacco Use     Smoking status: Current Every Day Smoker     Packs/day: 0.50     Years: 15.00     Pack years: 7.50     Types: Cigarettes     Smokeless tobacco: Former User     Types: Chew   Substance Use Topics     Alcohol use: Yes     Comment: rare      Wt Readings from Last 1 Encounters:   22 99.1 kg (218 lb 7.6 oz)        Anesthesia Evaluation   Pt has had prior anesthetic. Type: General.    No history of anesthetic complications       ROS/MED HX  ENT/Pulmonary:     (+) tobacco use, Current use,     Neurologic: Comment: migraines trigeminal neuralgia 1 month s/p right microvascular decompression on 2022 now with one day of expressable purulent drainage from the inferior part of his retroauricular incision      Cardiovascular:     (+) Dyslipidemia hypertension-----    METS/Exercise Tolerance:     Hematologic:  - neg hematologic  ROS     Musculoskeletal:  - neg musculoskeletal ROS     GI/Hepatic:     (+) GERD,     Renal/Genitourinary:  - neg Renal ROS     Endo:  - neg endo ROS     Psychiatric/Substance Use:     (+) H/O chronic opiod use .     Infectious Disease: Comment: Concern for mastoiditis versus deeper infection, as above.       Malignancy:  -  neg malignancy ROS     Other:            Physical Exam    Airway  airway exam normal      Mallampati: II       Respiratory Devices and Support         Dental  no notable dental history         Cardiovascular   cardiovascular exam normal          Pulmonary   pulmonary exam normal                OUTSIDE LABS:  CBC:   Lab Results   Component Value Date    WBC 9.7 05/06/2022    WBC 12.0 (H) 04/08/2022    HGB 14.9 05/06/2022    HGB 13.8 04/08/2022    HCT 44.9 05/06/2022    HCT 40.4 04/08/2022     05/06/2022     04/08/2022     BMP:   Lab Results   Component Value Date     05/06/2022     04/08/2022    POTASSIUM 4.0 05/06/2022    POTASSIUM 3.5 04/08/2022    CHLORIDE 103 05/06/2022    CHLORIDE 109 04/08/2022    CO2 28 05/06/2022    CO2 24 04/08/2022    BUN 18 05/06/2022    BUN 16 04/08/2022    CR 1.23 05/06/2022    CR 1.13 04/08/2022    GLC 91 05/06/2022     (H) 04/08/2022     COAGS:   Lab Results   Component Value Date    PTT 28 05/06/2022    INR 0.94 05/06/2022     POC: No results found for: BGM, HCG, HCGS  HEPATIC:   Lab Results   Component Value Date    ALBUMIN 3.7 12/09/2021    PROTTOTAL 7.3 12/09/2021    ALT 41 12/09/2021    AST 15 12/09/2021    ALKPHOS 104 12/09/2021    BILITOTAL 0.4 12/09/2021     OTHER:   Lab Results   Component Value Date    ANN MARIE 9.6 05/06/2022    PHOS 3.4 04/08/2022    MAG 2.0 04/08/2022    LIPASE 75 12/13/2011    CRP 11.0 (H) 05/06/2022    SED 4 12/09/2021       Anesthesia Plan    ASA Status:  2   NPO Status:  NPO Appropriate    Anesthesia Type: General.     - Airway: ETT   Induction: Intravenous.   Maintenance: Inhalation.   Techniques and Equipment:     - Lines/Monitors: BIS     Consents    Anesthesia Plan(s) and associated risks, benefits, and realistic alternatives discussed. Questions answered and patient/representative(s) expressed understanding.     - Discussed: Risks, Benefits and Alternatives for BOTH SEDATION and the PROCEDURE were discussed     -  Discussed with:  Patient      - Extended Intubation/Ventilatory Support Discussed: No.      - Patient is DNR/DNI Status: No    Use of blood products discussed: Yes.     - Discussed with: Patient.     Postoperative Care    Pain management: IV analgesics, Oral pain medications, Multi-modal analgesia.   PONV prophylaxis: Ondansetron (or other 5HT-3), Dexamethasone or Solumedrol     Comments:                Chen Osborne MD  Staff Anesthesiologist  [phone]

## 2022-05-07 NOTE — PROGRESS NOTES
"   05/07/22 0900   Quick Adds   Type of Visit Initial PT Evaluation   Living Environment   People in Home child(esther), adult   Current Living Arrangements house   Home Accessibility stairs to enter home;stairs within home   Number of Stairs, Main Entrance 3   Stair Railings, Main Entrance railing on left side (ascending)   Number of Stairs, Within Home, Primary other (see comments)   Transportation Anticipated car, drives self;family or friend will provide   Living Environment Comments son assists as needed   Self-Care   Usual Activity Tolerance good   Current Activity Tolerance good   Regular Exercise No   Equipment Currently Used at Home none   Fall history within last six months no   Activity/Exercise/Self-Care Comment normally works at [employer] full time.   General Information   Onset of Illness/Injury or Date of Surgery 05/06/22   Referring Physician Quang Dietrich MD   Patient/Family Therapy Goals Statement (PT) return home   Pertinent History of Current Problem (include personal factors and/or comorbidities that impact the POC) Jossue \"Elmo\" Melissa is a 50 yo male with PMH of trigeminal neuralgia who is 1 month s/p right microvascular decompression on 04/07/2022. He reports that starting yesterday morning after showering he noticed drainage from the inferior part of his right retroauricular surgical incision. He denies any other fevers, chills, nausea, vomiting, constipation/diarrhea, chest pain, shortness of breath, headaches, confusion, photophobia worsening neck stiffness, LOC, weakness, or numbness/tingling/paresthesias. Up until yesterday, his recovery had been progressing appropriately, and he hasn't had any of his pre-operative trigeminal neuralgia symptoms since his procedure. Of note, the last time he ate was 10AM this morning. Regarding his trigeminal symptoms, he reports since surgery he has not had any of his usual right sided V3 electrical pain. He has been slowly weaning his [place] related medications. "   Existing Precautions/Restrictions fall  (cranial)   General Observations activity as tolerated, VSS, 7/10 pain at rest.   Cognition   Affect/Mental Status (Cognition) WNL   Strength (Manual Muscle Testing)   Strength Comments Bilateral UE/LE grossly 5/5   Bed Mobility   Comment, (Bed Mobility) Supine<>sit SBA   Transfers   Comment, (Transfers) Sit to stand SBA   Gait/Stairs (Locomotion)   Comment, (Gait/Stairs) 100 ft wtith no AD, close SBA, no LOB, no lightheadedness/dizziness or increase in pain   Balance   Balance Comments static balance good wtih EO/EC, head turns, standing marches.   Clinical Impression   Criteria for Skilled Therapeutic Intervention Yes, treatment indicated   PT Diagnosis (PT) impaired functional mobility   Influenced by the following impairments deconditioning   Functional limitations due to impairments impaired gait and endurance   Clinical Presentation (PT Evaluation Complexity) Stable/Uncomplicated   Clinical Presentation Rationale clinical judgement   Clinical Decision Making (Complexity) low complexity   Planned Therapy Interventions (PT) balance training;gait training;stair training   Risk & Benefits of therapy have been explained evaluation/treatment results reviewed;care plan/treatment goals reviewed   PT Discharge Planning   PT Discharge Recommendation (DC Rec) home with assist   PT Rationale for DC Rec Patient mobilizing well, slightly deconditioned. Anticipate ok to DC home once medically stable. Will follow to ensure safe independent mobility and home exercise program.   PT Brief overview of current status SBA   Total Evaluation Time   Total Evaluation Time (Minutes) 5   Physical Therapy Goals   PT Frequency 2x/week   PT Predicted Duration/Target Date for Goal Attainment 05/21/22   PT Goals Gait;Stairs;PT Goal 1   PT: Gait Independent;Greater than 200 feet   PT: Stairs Independent;Greater than 10 stairs   PT: Goal 1 Patient to be ind with HEP in order to imporve endurance and  mobility.

## 2022-05-07 NOTE — ANESTHESIA POSTPROCEDURE EVALUATION
Patient: Jossue Torres    Procedure: Procedure(s):  Retro-Mastoid wound washout       Anesthesia Type:  General    Note:  Disposition: Admission   Postop Pain Control: Uneventful            Sign Out: Well controlled pain   PONV: No   Neuro/Psych: Uneventful            Sign Out: Acceptable/Baseline neuro status   Airway/Respiratory: Uneventful            Sign Out: Acceptable/Baseline resp. status   CV/Hemodynamics: Uneventful            Sign Out: Acceptable CV status; No obvious hypovolemia; No obvious fluid overload   Other NRE: NONE   DID A NON-ROUTINE EVENT OCCUR? No           Last vitals:  Vitals Value Taken Time   /82 05/07/22 0145   Temp 36.6  C (97.8  F) 05/07/22 0100   Pulse 68 05/07/22 0147   Resp 6 05/07/22 0147   SpO2 97 % 05/07/22 0147   Vitals shown include unvalidated device data.    Electronically Signed By: Pavel Osborne MD  May 7, 2022  1:48 AM

## 2022-05-07 NOTE — PHARMACY-VANCOMYCIN DOSING SERVICE
Pharmacy Vancomycin Initial Note  Date of Service May 7, 2022  Patient's  1970  51 year old, male    Indication: Meningitis    Current estimated CrCl = Estimated Creatinine Clearance: 84.3 mL/min (based on SCr of 1.23 mg/dL).    Creatinine for last 3 days  2022:  6:19 PM Creatinine 1.23 mg/dL    Recent Vancomycin Level(s) for last 3 days  No results found for requested labs within last 72 hours.      Vancomycin IV Administrations (past 72 hours)      No vancomycin orders with administrations in past 72 hours.                Nephrotoxins and other renal medications (From now, onward)    Start     Dose/Rate Route Frequency Ordered Stop    22 1430  vancomycin (VANCOCIN) 2,000 mg in sodium chloride 0.9 % 500 mL intermittent infusion         20 mg/kg × 99.1 kg  over 120 Minutes Intravenous EVERY 12 HOURS 22 0051      22 0200  vancomycin (VANCOCIN) 2,500 mg in sodium chloride 0.9 % 500 mL intermittent infusion         25 mg/kg × 99.1 kg  over 120 Minutes Intravenous ONCE 22 0049            Contrast Orders - past 72 hours (72h ago, onward)    None            Plan:  1. Start vancomycin 2500 mg IV x 1 dose then 2000 mg IV q12h.   2. Vancomycin monitoring method: Trough (Method 2 = manual dose calculation)  3. Vancomycin therapeutic monitoring goal: 15-20 mg/L  4. Pharmacy will check vancomycin levels as appropriate in 1-3 Days.    5. Serum creatinine levels will be ordered daily for the first week of therapy and at least twice weekly for subsequent weeks.      Shae Case, PharmD, BCPS

## 2022-05-07 NOTE — ANESTHESIA CARE TRANSFER NOTE
Patient: Jossue Torres    Procedure: Procedure(s):  Retro-Mastoid wound washout       Diagnosis: Wound infection [T14.8XXA, L08.9]  Mastoid abscess, right [H70.001]  Diagnosis Additional Information: No value filed.    Anesthesia Type:   General     Note:    Oropharynx: oropharynx clear of all foreign objects and spontaneously breathing  Level of Consciousness: drowsy  Oxygen Supplementation: face mask  Level of Supplemental Oxygen (L/min / FiO2): 8  Independent Airway: airway patency satisfactory and stable  Dentition: dentition unchanged  Vital Signs Stable: post-procedure vital signs reviewed and stable  Report to RN Given: handoff report given  Patient transferred to: PACU    Handoff Report: Identifed the Patient, Identified the Reponsible Provider, Reviewed the pertinent medical history, Discussed the surgical course, Reviewed Intra-OP anesthesia mangement and issues during anesthesia, Set expectations for post-procedure period and Allowed opportunity for questions and acknowledgement of understanding      Vitals:  Vitals Value Taken Time   /95 05/07/22 0033   Temp     Pulse 87 05/07/22 0037   Resp 15 05/07/22 0037   SpO2 100 % 05/07/22 0037   Vitals shown include unvalidated device data.    Electronically Signed By: Chen Hansen  May 7, 2022  12:38 AM

## 2022-05-07 NOTE — PLAN OF CARE
Admitted/transferred from: PACU   Reason for admission/transfer: further monitoring post retro-mastoid wound wash out  2 RN skin assessment: completed by Lauren Juárez   Result of skin assessment and interventions/actions: 3 incision sites on R side of head behind the ear. Minimal drainage. No actions taken.   Height, weight, drug calc weight: Done  Patient belongings (see Flowsheet): belongings with pt  MDRO education added to care plan: Yes  ?

## 2022-05-08 LAB
ANION GAP SERPL CALCULATED.3IONS-SCNC: 8 MMOL/L (ref 3–14)
BUN SERPL-MCNC: 24 MG/DL (ref 7–30)
CALCIUM SERPL-MCNC: 8.9 MG/DL (ref 8.5–10.1)
CHLORIDE BLD-SCNC: 108 MMOL/L (ref 94–109)
CO2 SERPL-SCNC: 24 MMOL/L (ref 20–32)
CREAT SERPL-MCNC: 1.23 MG/DL (ref 0.66–1.25)
ERYTHROCYTE [DISTWIDTH] IN BLOOD BY AUTOMATED COUNT: 12.6 % (ref 10–15)
GFR SERPL CREATININE-BSD FRML MDRD: 71 ML/MIN/1.73M2
GLUCOSE BLD-MCNC: 98 MG/DL (ref 70–99)
GLUCOSE BLDC GLUCOMTR-MCNC: 131 MG/DL (ref 70–99)
HCT VFR BLD AUTO: 41.1 % (ref 40–53)
HGB BLD-MCNC: 13.7 G/DL (ref 13.3–17.7)
MAGNESIUM SERPL-MCNC: 2.2 MG/DL (ref 1.6–2.3)
MCH RBC QN AUTO: 32.9 PG (ref 26.5–33)
MCHC RBC AUTO-ENTMCNC: 33.3 G/DL (ref 31.5–36.5)
MCV RBC AUTO: 99 FL (ref 78–100)
PHOSPHATE SERPL-MCNC: 4.9 MG/DL (ref 2.5–4.5)
PLATELET # BLD AUTO: 275 10E3/UL (ref 150–450)
POTASSIUM BLD-SCNC: 4 MMOL/L (ref 3.4–5.3)
RBC # BLD AUTO: 4.17 10E6/UL (ref 4.4–5.9)
SODIUM SERPL-SCNC: 140 MMOL/L (ref 133–144)
TROPONIN I SERPL HS-MCNC: 7 NG/L
VANCOMYCIN SERPL-MCNC: 27.2 MG/L
WBC # BLD AUTO: 10 10E3/UL (ref 4–11)

## 2022-05-08 PROCEDURE — 120N000002 HC R&B MED SURG/OB UMMC

## 2022-05-08 PROCEDURE — 258N000003 HC RX IP 258 OP 636: Performed by: NEUROLOGICAL SURGERY

## 2022-05-08 PROCEDURE — 250N000013 HC RX MED GY IP 250 OP 250 PS 637: Performed by: STUDENT IN AN ORGANIZED HEALTH CARE EDUCATION/TRAINING PROGRAM

## 2022-05-08 PROCEDURE — 84484 ASSAY OF TROPONIN QUANT: CPT | Performed by: NEUROLOGICAL SURGERY

## 2022-05-08 PROCEDURE — 85014 HEMATOCRIT: CPT | Performed by: STUDENT IN AN ORGANIZED HEALTH CARE EDUCATION/TRAINING PROGRAM

## 2022-05-08 PROCEDURE — 250N000011 HC RX IP 250 OP 636: Performed by: NEUROLOGICAL SURGERY

## 2022-05-08 PROCEDURE — 93005 ELECTROCARDIOGRAM TRACING: CPT

## 2022-05-08 PROCEDURE — 83735 ASSAY OF MAGNESIUM: CPT | Performed by: STUDENT IN AN ORGANIZED HEALTH CARE EDUCATION/TRAINING PROGRAM

## 2022-05-08 PROCEDURE — 82310 ASSAY OF CALCIUM: CPT | Performed by: STUDENT IN AN ORGANIZED HEALTH CARE EDUCATION/TRAINING PROGRAM

## 2022-05-08 PROCEDURE — 250N000011 HC RX IP 250 OP 636: Performed by: STUDENT IN AN ORGANIZED HEALTH CARE EDUCATION/TRAINING PROGRAM

## 2022-05-08 PROCEDURE — 80202 ASSAY OF VANCOMYCIN: CPT | Performed by: NEUROLOGICAL SURGERY

## 2022-05-08 PROCEDURE — 36415 COLL VENOUS BLD VENIPUNCTURE: CPT | Performed by: NEUROLOGICAL SURGERY

## 2022-05-08 PROCEDURE — 84100 ASSAY OF PHOSPHORUS: CPT | Performed by: STUDENT IN AN ORGANIZED HEALTH CARE EDUCATION/TRAINING PROGRAM

## 2022-05-08 PROCEDURE — 36415 COLL VENOUS BLD VENIPUNCTURE: CPT | Performed by: STUDENT IN AN ORGANIZED HEALTH CARE EDUCATION/TRAINING PROGRAM

## 2022-05-08 PROCEDURE — 93010 ELECTROCARDIOGRAM REPORT: CPT | Performed by: INTERNAL MEDICINE

## 2022-05-08 PROCEDURE — 99254 IP/OBS CNSLTJ NEW/EST MOD 60: CPT | Performed by: INTERNAL MEDICINE

## 2022-05-08 RX ORDER — METRONIDAZOLE 500 MG/100ML
500 INJECTION, SOLUTION INTRAVENOUS EVERY 8 HOURS
Status: DISCONTINUED | OUTPATIENT
Start: 2022-05-08 | End: 2022-05-10

## 2022-05-08 RX ORDER — METOCLOPRAMIDE 5 MG/1
5 TABLET ORAL ONCE
Status: COMPLETED | OUTPATIENT
Start: 2022-05-08 | End: 2022-05-08

## 2022-05-08 RX ADMIN — OXCARBAZEPINE 600 MG: 600 TABLET, FILM COATED ORAL at 08:02

## 2022-05-08 RX ADMIN — VANCOMYCIN HYDROCHLORIDE 2000 MG: 1 INJECTION, POWDER, LYOPHILIZED, FOR SOLUTION INTRAVENOUS at 16:06

## 2022-05-08 RX ADMIN — ACETAMINOPHEN 650 MG: 325 TABLET ORAL at 08:07

## 2022-05-08 RX ADMIN — PROCHLORPERAZINE EDISYLATE 10 MG: 5 INJECTION INTRAMUSCULAR; INTRAVENOUS at 20:39

## 2022-05-08 RX ADMIN — ACETAMINOPHEN 650 MG: 325 TABLET ORAL at 12:41

## 2022-05-08 RX ADMIN — OXCARBAZEPINE 300 MG: 150 TABLET, FILM COATED ORAL at 22:12

## 2022-05-08 RX ADMIN — METRONIDAZOLE 500 MG: 500 INJECTION, SOLUTION INTRAVENOUS at 18:57

## 2022-05-08 RX ADMIN — ACETAMINOPHEN 650 MG: 325 TABLET ORAL at 22:11

## 2022-05-08 RX ADMIN — HYDROMORPHONE HYDROCHLORIDE 0.2 MG: 0.2 INJECTION, SOLUTION INTRAMUSCULAR; INTRAVENOUS; SUBCUTANEOUS at 20:31

## 2022-05-08 RX ADMIN — CEFEPIME HYDROCHLORIDE 2 G: 2 INJECTION, POWDER, FOR SOLUTION INTRAVENOUS at 09:57

## 2022-05-08 RX ADMIN — OXYCODONE HYDROCHLORIDE 10 MG: 10 TABLET ORAL at 02:50

## 2022-05-08 RX ADMIN — ACETAMINOPHEN 650 MG: 325 TABLET ORAL at 02:50

## 2022-05-08 RX ADMIN — SIMVASTATIN 40 MG: 40 TABLET, FILM COATED ORAL at 22:11

## 2022-05-08 RX ADMIN — LEVETIRACETAM 1000 MG: 500 TABLET, FILM COATED ORAL at 08:02

## 2022-05-08 RX ADMIN — OXYCODONE HYDROCHLORIDE 5 MG: 5 TABLET ORAL at 22:11

## 2022-05-08 RX ADMIN — HYDROCHLOROTHIAZIDE 25 MG: 25 TABLET ORAL at 08:02

## 2022-05-08 RX ADMIN — GABAPENTIN 600 MG: 600 TABLET, FILM COATED ORAL at 08:02

## 2022-05-08 RX ADMIN — HYDROMORPHONE HYDROCHLORIDE 0.4 MG: 0.2 INJECTION, SOLUTION INTRAMUSCULAR; INTRAVENOUS; SUBCUTANEOUS at 05:18

## 2022-05-08 RX ADMIN — CEFEPIME HYDROCHLORIDE 2 G: 2 INJECTION, POWDER, FOR SOLUTION INTRAVENOUS at 02:54

## 2022-05-08 RX ADMIN — DOCUSATE SODIUM 50 MG AND SENNOSIDES 8.6 MG 1 TABLET: 8.6; 5 TABLET, FILM COATED ORAL at 20:24

## 2022-05-08 RX ADMIN — DOCUSATE SODIUM 50 MG AND SENNOSIDES 8.6 MG 1 TABLET: 8.6; 5 TABLET, FILM COATED ORAL at 08:02

## 2022-05-08 RX ADMIN — METOCLOPRAMIDE 5 MG: 5 TABLET ORAL at 22:12

## 2022-05-08 RX ADMIN — OXYCODONE HYDROCHLORIDE 10 MG: 10 TABLET ORAL at 12:41

## 2022-05-08 RX ADMIN — TIZANIDINE 2 MG: 2 TABLET ORAL at 14:34

## 2022-05-08 RX ADMIN — OXYCODONE HYDROCHLORIDE 10 MG: 10 TABLET ORAL at 08:07

## 2022-05-08 RX ADMIN — GABAPENTIN 600 MG: 600 TABLET, FILM COATED ORAL at 14:34

## 2022-05-08 RX ADMIN — ONDANSETRON 4 MG: 4 TABLET, ORALLY DISINTEGRATING ORAL at 20:24

## 2022-05-08 RX ADMIN — GABAPENTIN 600 MG: 600 TABLET, FILM COATED ORAL at 20:24

## 2022-05-08 RX ADMIN — CEFEPIME HYDROCHLORIDE 2 G: 2 INJECTION, POWDER, FOR SOLUTION INTRAVENOUS at 18:17

## 2022-05-08 RX ADMIN — HYDROMORPHONE HYDROCHLORIDE 0.4 MG: 0.2 INJECTION, SOLUTION INTRAMUSCULAR; INTRAVENOUS; SUBCUTANEOUS at 16:14

## 2022-05-08 RX ADMIN — VANCOMYCIN HYDROCHLORIDE 2000 MG: 1 INJECTION, POWDER, LYOPHILIZED, FOR SOLUTION INTRAVENOUS at 05:16

## 2022-05-08 RX ADMIN — HYDRALAZINE HYDROCHLORIDE 10 MG: 20 INJECTION INTRAMUSCULAR; INTRAVENOUS at 12:41

## 2022-05-08 RX ADMIN — LEVETIRACETAM 1000 MG: 500 TABLET, FILM COATED ORAL at 20:24

## 2022-05-08 RX ADMIN — HYDROMORPHONE HYDROCHLORIDE 0.4 MG: 0.2 INJECTION, SOLUTION INTRAMUSCULAR; INTRAVENOUS; SUBCUTANEOUS at 10:03

## 2022-05-08 ASSESSMENT — ACTIVITIES OF DAILY LIVING (ADL)
ADLS_ACUITY_SCORE: 5

## 2022-05-08 ASSESSMENT — VISUAL ACUITY: OU: BASELINE

## 2022-05-08 NOTE — PHARMACY-VANCOMYCIN DOSING SERVICE
Pharmacy Vancomycin Note  Date of Service May 8, 2022  Patient's  1970   51 year old, male    Indication: Meningitis and Postoperative Infection  Day of Therapy: 2  Current vancomycin regimen:  2000 mg IV q12h  Current vancomycin monitoring method: Trough (Method 2 = manual dose calculation)  Current vancomycin therapeutic monitoring goal: 15-20 mg/L    Current estimated CrCl = Estimated Creatinine Clearance: 84.1 mL/min (based on SCr of 1.23 mg/dL).    Creatinine for last 3 days  2022:  6:19 PM Creatinine 1.23 mg/dL  2022:  4:53 AM Creatinine 1.16 mg/dL  2022:  7:27 AM Creatinine 1.23 mg/dL    Recent Vancomycin Levels (past 3 days)  2022:  3:48 PM Vancomycin 27.2 mg/L    Vancomycin IV Administrations (past 72 hours)                   vancomycin (VANCOCIN) 2,000 mg in sodium chloride 0.9 % 500 mL intermittent infusion (mg) 2,000 mg New Bag 22 1606     2,000 mg New Bag  0516     2,000 mg New Bag 22 1632    vancomycin (VANCOCIN) 2,500 mg in sodium chloride 0.9 % 500 mL intermittent infusion (mg) 2,500 mg New Bag 22 0430                Nephrotoxins and other renal medications (From now, onward)    Start     Dose/Rate Route Frequency Ordered Stop    22 1630  vancomycin (VANCOCIN) 2,000 mg in sodium chloride 0.9 % 500 mL intermittent infusion         20 mg/kg × 99.1 kg  over 120 Minutes Intravenous EVERY 12 HOURS 22 0051               Contrast Orders - past 72 hours (72h ago, onward)    None          Interpretation of levels and current regimen:  Vancomycin level is reflective of supratherapeutic level   - AM dose given a bit late and level drawn a little early, extrapolated trough ~24.5 mg/mL.    Has serum creatinine changed greater than 50% in last 72 hours: No    Urine output:  good urine output    Renal Function: Stable      Plan:  1. Decrease Dose to 1500mg every 12 hours  2. Vancomycin monitoring method: Trough (Method 2 = manual dose calculation)  3. Vancomycin  therapeutic monitoring goal: 15-20 mg/L  4. Pharmacy will check vancomycin levels as appropriate in 1-3 Days.  5. Serum creatinine levels will be ordered daily for the first week of therapy and at least twice weekly for subsequent weeks.    Ede Nails RPH

## 2022-05-08 NOTE — PLAN OF CARE
Status: POD #1 s/p Retro-Mastoid wound washout. S/p right retrosigmoid microvascular decompression of CN V 4/7, PMH trigeminal neuralgia   Vitals: VSS on RA  Neuros: Intact  IV: PIV TKO between antibiotics, PIV SL  Labs/Electrolytes: Phos and mag replaced, redraw in AM  Diet: Regular  Bowel status: LBM PTA, passing gas  : Voiding  Skin: Head incision covered with primapore, marked with no extension   Pain: Headache/incisional pain controlled with PRN tylenol, PO oxycodone, IV dilaudid, tizanidine. Received 1x dose of PRN eletriptan (took PTA for migraines), not helpful but requested it be available if needed   Activity: Up independently  Plan/updates: ID consulted, continue POC     Arrived from: [facility]   Belongings/meds: Remain with patient - cell phone, wallet, clothing  2 RN Skin Assessment Completed by: Lars OZUNA  Non-intact findings documented (yes/no/NA): Yes

## 2022-05-08 NOTE — CONSULTS
"[facility]  General ID Service Consult      Patient: Jossue Torres  YOB: 1970, MRN: [id number]  Date of Admission:  5/6/2022  Date of Consult: 05/08/2022  Consult Requested by: Grey Simeon MD  Admission Diagnosis: Postoperative wound infection [T81.49XA]  Consult Question: \"1 month post R MVD - wound infection with meningeal involvement\"    ID Assessment & Plan      ASSESSMENT: 51-year-old gentleman with recent microvascular decompression for trigeminal neuralgia complicated by surgical site and graft infection abutting the dura.  Clinically very well-appearing and hemodynamically stable.  The lack of systemic symptoms and minimal pain suggestive of indolent infection like coag negative staph or c acnes.  Gram stain is positive for gram-positive cocci and gram-positive bacilli however cultures of remain negative so far.  For the time being would continue vancomycin and cefepime with the addition of metronidazole for additional anaerobic coverage.  If no anaerobes are isolated this can likely be discontinued.  We will narrow antibiotics based on culture results.  We will plan for approximately 2 to 3 weeks of therapy, likely IV.    RECOMMENDATION:  1.  Continue cefepime and vancomycin  2.  Add empiric metronidazole 500mg q8h  3.  Follow cultures    The patient's care was discussed with the Attending Physician, Dr. Posadas, Patient and Primary team.    Richmond Jeong MD  [facility]  ______________________________________________________________________    Chief Complaint   Postoperative infection    History is obtained from the patient    History of Present Illness   Jossue Torres is a 51 year old male who with history of trigeminal neuralgia status post right microvascular decompression on 4/7.  While showering yesterday noted drainage from the inferior part of his surgical incision.  He had been " improving until 1 day prior to presentation from his trigeminal neuralgia symptoms and had not had any other associated symptoms including fever, chills, nausea vomiting.  Images from neurosurgery notes show purulent drainage from the surgical incision site.  He was taken to the OR on 5/6 for washout. In discussion with neurosurgeon, all hardware and foreign material removed. Unfortunately, this was along the dura and there was concern going deeper would lead to CSF leak.      After the OR, he was started on vancomycin and cefepime.  He did receive perioperative cefazolin.  There was no white blood cell count elevation on admission and his CRP was mildly elevated to 11. Infectious disease was consulted for antibiotic management.     Today, he is remained afebrile with only postoperative pain.  He confirms the above history and reports feeling very well.  He wants to discharge soon as possible however he understands that he had a infection near his brain.  He denies fever, chills or other systemic symptoms.  No cough or abdominal pain.  No shortness of breath.    Review of Systems   The 10 point Review of Systems is negative other than noted in the HPI or here.    Past Medical History    Past Medical History:   Diagnosis Date     Herpes zoster without mention of complication 1991       Past Surgical History   Past Surgical History:   Procedure Laterality Date     CRANIOTOMY, DECOMPRESS NEUROVASCULAR, COMBINED Right 4/7/2022    Procedure: Right microvascular decompression  Latex Free;  Surgeon: Grey Simeon MD;  Location: [facility] OR     SURGICAL HISTORY OF -   9/9/69    right inguinal hernioplasty     SURGICAL HISTORY OF -   11/16/93    lysis of two penile adhesions and cauterization of penile condyloma.       Social History   Social History     Tobacco Use     Smoking status: Current Every Day Smoker     Packs/day: 0.50     Years: 15.00     Pack years: 7.50     Types: Cigarettes     Smokeless tobacco: Former  User     Types: Chew   Vaping Use     Vaping Use: Never used   Substance Use Topics     Alcohol use: Yes     Comment: rare     Drug use: No       Family History   I have reviewed this patient's family history and updated it with pertinent information if needed.  Family History   Problem Relation Age of Onset     Hypertension Father      Hypertension Brother      C.A.D. Maternal Grandmother         40's     C.A.D. Maternal Aunt         40's     C.A.D. Maternal Uncle         40's     Anesthesia Reaction No family hx of      Deep Vein Thrombosis (DVT) No family hx of        Medications   I have reviewed this patient's current medications  Current Facility-Administered Medications   Medication     acetaminophen (TYLENOL) tablet 650 mg     bisacodyl (DULCOLAX) Suppository 10 mg     ceFEPIme (MAXIPIME) 2 g vial to attach to  ml bag for ADULTS or 50 ml bag for PEDS     gabapentin (NEURONTIN) tablet 600 mg     hydrALAZINE (APRESOLINE) injection 10-20 mg     hydrochlorothiazide (HYDRODIURIL) tablet 25 mg     HYDROmorphone (DILAUDID) injection 0.2 mg    Or     HYDROmorphone (DILAUDID) injection 0.4 mg     labetalol (NORMODYNE/TRANDATE) injection 10-40 mg     levETIRAcetam (KEPPRA) tablet 1,000 mg     lidocaine (LMX4) cream     lidocaine 1 % 0.1-1 mL     magnesium hydroxide (MILK OF MAGNESIA) suspension 30 mL     naloxone (NARCAN) injection 0.2 mg    Or     naloxone (NARCAN) injection 0.4 mg    Or     naloxone (NARCAN) injection 0.2 mg    Or     naloxone (NARCAN) injection 0.4 mg     ondansetron (ZOFRAN ODT) ODT tab 4-8 mg    Or     ondansetron (ZOFRAN) injection 4-8 mg     OXcarbazepine (TRILEPTAL) tablet 300 mg     OXcarbazepine (TRILEPTAL) tablet 600 mg     oxyCODONE (ROXICODONE) tablet 5 mg    Or     oxyCODONE IR (ROXICODONE) tablet 10 mg     polyethylene glycol (MIRALAX) Packet 17 g     prochlorperazine (COMPAZINE) injection 10 mg    Or     prochlorperazine (COMPAZINE) tablet 10 mg    Or     prochlorperazine  (COMPAZINE) suppository 25 mg     senna-docusate (SENOKOT-S/PERICOLACE) 8.6-50 MG per tablet 1 tablet     simvastatin (ZOCOR) tablet 40 mg     sodium chloride (PF) 0.9% PF flush 10-40 mL     sodium chloride (PF) 0.9% PF flush 3 mL     sodium chloride (PF) 0.9% PF flush 3 mL     tiZANidine (ZANAFLEX) tablet 2 mg     vancomycin (VANCOCIN) 2,000 mg in sodium chloride 0.9 % 500 mL intermittent infusion       Allergies   Allergies   Allergen Reactions     Nkda [No Known Drug Allergies]        Physical Exam   Vital Signs: Temp: 98.1  F (36.7  C) Temp src: Oral BP: 134/79 Pulse: 82   Resp: 16 SpO2: 97 % O2 Device: None (Room air)    Weight: 217 lbs 4.8 oz    Constitutional: awake, alert, cooperative, no apparent distress, and appears stated age  Eyes: Lids and lashes normal, pupils equal, round and reactive to light, extra ocular muscles intact, sclera clear, conjunctiva normal  ENT: Right-sided posterior to the ear is a surgical dressing.  No surrounding cellulitis, external ears without lesions, oral pharynx with moist mucous membranes, tonsils without erythema or exudates, gums normal and good dentition.  Hematologic / Lymphatic: no cervical lymphadenopathy and no supraclavicular lymphadenopathy  Respiratory: No increased work of breathing, good air exchange, clear to auscultation bilaterally, no crackles or wheezing  Cardiovascular: Normal apical impulse, regular rate and rhythm, normal S1 and S2, no S3 or S4, and no murmur noted  GI: No scars, normal bowel sounds, soft, non-distended, non-tender, no masses palpated, no hepatosplenomegally  Genitounirinary: Deferred  Skin: no bruising or bleeding, normal skin color, texture, turgor, no redness, warmth, or swelling and no rashes  Musculoskeletal: There is no redness, warmth, or swelling of the joints.  Full range of motion noted.  Motor strength is 5 out of 5 all extremities bilaterally.  Tone is normal.  Neurologic: Awake, alert, oriented to name, place and time.   Cranial nerves II-XII are grossly intact.  Motor is 5 out of 5 bilaterally.  Neuropsychiatric: General: normal, calm and normal eye contact  Affect: normal  Orientation: oriented to self, place, time and situation      Data   Inflammatory Markers   Recent Labs   Lab Test 05/07/22  0833 05/06/22  1819 12/09/21  1441 12/09/21  1440   SED 10  --  4  --    CRP 12.0* 11.0*  --  <2.9        Hematology Studies   Recent Labs   Lab Test 05/08/22  0727 05/06/22  1819 04/08/22  0413 12/09/21  1441   WBC 10.0 9.7 12.0* 8.4   HGB 13.7 14.9 13.8 16.0   MCV 99 98 97 95    316 229 247       Metabolic Studies   Recent Labs   Lab Test 05/08/22  0727 05/07/22  0453 05/06/22  1819 04/08/22  0413 04/07/22  1507 02/11/22  1625    138 137 139  --  139   POTASSIUM 4.0 3.9 4.0 3.5 3.8 3.9   CHLORIDE 108 106 103 109  --  107   CO2 24 24 28 24  --  28   BUN 24 17 18 16  --  14   CR 1.23 1.16 1.23 1.13 1.16 1.14   GFRESTIMATED 71 76 71 78 76 77       Hepatic Studies    Recent Labs   Lab Test 12/09/21  1440   BILITOTAL 0.4   ALKPHOS 104   ALBUMIN 3.7   AST 15   ALT 41

## 2022-05-08 NOTE — PROVIDER NOTIFICATION
Pt c/o anxiety, chest tightness, and his heart is racing. MD notified. Stat troponin and EKG done.

## 2022-05-08 NOTE — PROGRESS NOTES
[facility], [place]   Neurosurgery Progress Note:    Assessment: Jossue Torres is a 51 year old male with history of right V3 trigeminal neuralgia s/p right retrosigmoid microvascular decompression of CN V who presented to the [facility] ED with purulent discharge from his wound on 5/6.  He was taken directly from the ED to the OR for wound culture and washout.  Postoperatively, he was admitted to the  Neuro ICU and has remained clinically stable.  He continues on broad-spectrum antibiotic coverage.    Plan:  - Serial neuro exams  - Pain control  - HOB > 30 degrees  - Regular diet  - Monitor right postauricular incision  - Continue vancomycin and cefepime  - Consult Infectious Disease  - Consult for PICC placement  - Bowel regimen  - PRN antiemetics  - PT/OT  - SCDs for DVT proph    Greatly appreciate the help from PT/OT in caring for Jossue Torres    -----------------------------------  Lakesha Beth MD  Neurosurgery PGY2    Please contact neurosurgery resident on call with questions.    Dial * * [phone], enter [phone] when prompted.      Interval History/Subjective: No acute events overnight. Cultures remain pending. Transferred to floor yesterday. No trigeminal neuralgia.     Objective:   Temp:  [97.3  F (36.3  C)-98.1  F (36.7  C)] 97.3  F (36.3  C)  Pulse:  [] 91  Resp:  [14-20] 16  BP: (113-150)/(67-94) 130/86  SpO2:  [96 %-98 %] 97 %  I/O last 3 completed shifts:  In: 1765 [P.O.:1090; I.V.:675]  Out: -     Gen: Appears comfortable, NAD  Wound: Incision, clean, dry, intact without strikethrough  Neurologic:  - Alert & Oriented to person, place, time, and situation  - Follows commands briskly  - Speech fluent, spontaneous. No aphasia or dysarthria.  - No gaze preference. No apparent hemineglect.  - PERRL, EOMI  - Strong brow raise, eye closure, and smile  - Face symmetric with sensation intact to light touch  - Trapezii and sternocleidomastoid muscles 5/5 bilaterally  - No pronator  drift     Del Tr Bi WE WF Gr   R 5 5 5 5 5 5   L 5 5 5 5 5 5    HF KE KF DF PF EHL   R 5 5 5 5 5 5   L 5 5 5 5 5 5     Sensation intact and symmetric to light touch throughout    LABS  Recent Labs   Lab Test 05/08/22 0727 05/06/22 1819 04/08/22  0413   WBC 10.0 9.7 12.0*   HGB 13.7 14.9 13.8   MCV 99 98 97    316 229       Recent Labs   Lab Test 05/08/22 0727 05/07/22  0453 05/07/22  0214 05/06/22 2201 05/06/22 1819    138  --   --  137   POTASSIUM 4.0 3.9  --   --  4.0   CHLORIDE 108 106  --   --  103   CO2 24 24  --   --  28   BUN 24 17  --   --  18   CR 1.23 1.16  --   --  1.23   ANIONGAP 8 8  --   --  6   ANN MARIE 8.9 9.2  --   --  9.6   GLC 98 130* 127*   < > 91    < > = values in this interval not displayed.       IMAGING  No results found for this or any previous visit (from the past 24 hour(s)).

## 2022-05-08 NOTE — PLAN OF CARE
"/67   Pulse 84   Temp 98  F (36.7  C) (Oral)   Resp 16   Ht 1.803 m (5' 10.98\")   Wt 98.6 kg (217 lb 4.8 oz)   SpO2 96%   BMI 30.32 kg/m      Neuro: A&Ox4. Anxious, wants to go outside to smoke. Offered nicotine patch, but pt refused  Cardiac: 's prn hydralazine given x1, OVSS  Respiratory: On RA sating 96%  GI/: Voiding spontaneously, not saving. Reports flatus. No BM  Diet/appetite: Tolerating regular diet. Eating well.  Activity: Up in room independently   Pain: C/O head incision pain 6/10. Oxycodone 10mg and IV dilaudid 0.4mg given per pt requests  Skin: Right head incision dressing marked - no change  LDA's: R PIV tko for abx    Neurosurgery MD said hold off with picc placement for today  K+, Mag, and Phos WNL. Recheck ordered for tomorrow am     Plan: Continue with POC. Notify primary team with changes.                        "

## 2022-05-08 NOTE — PLAN OF CARE
Status: POD#2 for Retromastoid wound washout. History of decompression or trigeminal nerve   Vitals: VSS  Neuros: Intact  IV: PIV @ TKO with abx  Resp/trach: RA  Diet: Regular  Bowel status: passing gas  : voiding adequately   Skin: incision covered   Pain: receiving oxycodone and dilaudid for post op pain, calls appropriately   Activity: up independently  Plan: Continue giving painmeds

## 2022-05-09 ENCOUNTER — HOME INFUSION (PRE-WILLOW HOME INFUSION) (OUTPATIENT)
Dept: PHARMACY | Facility: CLINIC | Age: 52
End: 2022-05-09
Payer: COMMERCIAL

## 2022-05-09 ENCOUNTER — APPOINTMENT (OUTPATIENT)
Dept: PHYSICAL THERAPY | Facility: CLINIC | Age: 52
End: 2022-05-09
Payer: COMMERCIAL

## 2022-05-09 LAB
ANION GAP SERPL CALCULATED.3IONS-SCNC: 5 MMOL/L (ref 3–14)
ATRIAL RATE - MUSE: 81 BPM
BUN SERPL-MCNC: 17 MG/DL (ref 7–30)
CALCIUM SERPL-MCNC: 8.6 MG/DL (ref 8.5–10.1)
CHLORIDE BLD-SCNC: 108 MMOL/L (ref 94–109)
CO2 SERPL-SCNC: 25 MMOL/L (ref 20–32)
CREAT SERPL-MCNC: 1.15 MG/DL (ref 0.66–1.25)
CRP SERPL-MCNC: 11 MG/L (ref 0–8)
CRP SERPL-MCNC: 12 MG/L (ref 0–8)
DIASTOLIC BLOOD PRESSURE - MUSE: NORMAL MMHG
ERYTHROCYTE [DISTWIDTH] IN BLOOD BY AUTOMATED COUNT: 12.8 % (ref 10–15)
ERYTHROCYTE [SEDIMENTATION RATE] IN BLOOD BY WESTERGREN METHOD: 12 MM/HR (ref 0–20)
ERYTHROCYTE [SEDIMENTATION RATE] IN BLOOD BY WESTERGREN METHOD: 13 MM/HR (ref 0–20)
GFR SERPL CREATININE-BSD FRML MDRD: 77 ML/MIN/1.73M2
GLUCOSE BLD-MCNC: 116 MG/DL (ref 70–99)
HCT VFR BLD AUTO: 36 % (ref 40–53)
HGB BLD-MCNC: 12.2 G/DL (ref 13.3–17.7)
INTERPRETATION ECG - MUSE: NORMAL
MAGNESIUM SERPL-MCNC: 2 MG/DL (ref 1.6–2.3)
MCH RBC QN AUTO: 33 PG (ref 26.5–33)
MCHC RBC AUTO-ENTMCNC: 33.9 G/DL (ref 31.5–36.5)
MCV RBC AUTO: 97 FL (ref 78–100)
P AXIS - MUSE: 35 DEGREES
PHOSPHATE SERPL-MCNC: 3.9 MG/DL (ref 2.5–4.5)
PLATELET # BLD AUTO: 246 10E3/UL (ref 150–450)
POTASSIUM BLD-SCNC: 3.5 MMOL/L (ref 3.4–5.3)
PR INTERVAL - MUSE: 168 MS
QRS DURATION - MUSE: 94 MS
QT - MUSE: 390 MS
QTC - MUSE: 453 MS
R AXIS - MUSE: 63 DEGREES
RBC # BLD AUTO: 3.7 10E6/UL (ref 4.4–5.9)
SODIUM SERPL-SCNC: 138 MMOL/L (ref 133–144)
SYSTOLIC BLOOD PRESSURE - MUSE: NORMAL MMHG
T AXIS - MUSE: 37 DEGREES
VENTRICULAR RATE- MUSE: 81 BPM
WBC # BLD AUTO: 7.3 10E3/UL (ref 4–11)

## 2022-05-09 PROCEDURE — 85027 COMPLETE CBC AUTOMATED: CPT | Performed by: STUDENT IN AN ORGANIZED HEALTH CARE EDUCATION/TRAINING PROGRAM

## 2022-05-09 PROCEDURE — 86140 C-REACTIVE PROTEIN: CPT | Performed by: STUDENT IN AN ORGANIZED HEALTH CARE EDUCATION/TRAINING PROGRAM

## 2022-05-09 PROCEDURE — 250N000011 HC RX IP 250 OP 636: Performed by: STUDENT IN AN ORGANIZED HEALTH CARE EDUCATION/TRAINING PROGRAM

## 2022-05-09 PROCEDURE — 250N000013 HC RX MED GY IP 250 OP 250 PS 637: Performed by: NEUROLOGICAL SURGERY

## 2022-05-09 PROCEDURE — 250N000013 HC RX MED GY IP 250 OP 250 PS 637: Performed by: NURSE PRACTITIONER

## 2022-05-09 PROCEDURE — 258N000003 HC RX IP 258 OP 636: Performed by: NEUROLOGICAL SURGERY

## 2022-05-09 PROCEDURE — 83735 ASSAY OF MAGNESIUM: CPT | Performed by: STUDENT IN AN ORGANIZED HEALTH CARE EDUCATION/TRAINING PROGRAM

## 2022-05-09 PROCEDURE — 36415 COLL VENOUS BLD VENIPUNCTURE: CPT | Performed by: STUDENT IN AN ORGANIZED HEALTH CARE EDUCATION/TRAINING PROGRAM

## 2022-05-09 PROCEDURE — 250N000011 HC RX IP 250 OP 636: Performed by: NEUROLOGICAL SURGERY

## 2022-05-09 PROCEDURE — 85652 RBC SED RATE AUTOMATED: CPT | Performed by: STUDENT IN AN ORGANIZED HEALTH CARE EDUCATION/TRAINING PROGRAM

## 2022-05-09 PROCEDURE — 97530 THERAPEUTIC ACTIVITIES: CPT | Mod: GP

## 2022-05-09 PROCEDURE — 250N000013 HC RX MED GY IP 250 OP 250 PS 637: Performed by: STUDENT IN AN ORGANIZED HEALTH CARE EDUCATION/TRAINING PROGRAM

## 2022-05-09 PROCEDURE — 999N000128 HC STATISTIC PERIPHERAL IV START W/O US GUIDANCE

## 2022-05-09 PROCEDURE — 80048 BASIC METABOLIC PNL TOTAL CA: CPT | Performed by: STUDENT IN AN ORGANIZED HEALTH CARE EDUCATION/TRAINING PROGRAM

## 2022-05-09 PROCEDURE — 84100 ASSAY OF PHOSPHORUS: CPT | Performed by: STUDENT IN AN ORGANIZED HEALTH CARE EDUCATION/TRAINING PROGRAM

## 2022-05-09 PROCEDURE — 120N000002 HC R&B MED SURG/OB UMMC

## 2022-05-09 RX ORDER — AMOXICILLIN 250 MG
1 CAPSULE ORAL 2 TIMES DAILY
Qty: 14 TABLET | Refills: 0 | Status: SHIPPED | OUTPATIENT
Start: 2022-05-09 | End: 2022-10-11

## 2022-05-09 RX ORDER — POTASSIUM CHLORIDE 750 MG/1
20 TABLET, EXTENDED RELEASE ORAL ONCE
Status: COMPLETED | OUTPATIENT
Start: 2022-05-09 | End: 2022-05-09

## 2022-05-09 RX ORDER — LEVETIRACETAM 1000 MG/1
1000 TABLET ORAL 2 TIMES DAILY
Qty: 14 TABLET | Refills: 0 | Status: SHIPPED | OUTPATIENT
Start: 2022-05-09 | End: 2022-05-11

## 2022-05-09 RX ORDER — CALCIUM CARBONATE 500 MG/1
500 TABLET, CHEWABLE ORAL DAILY PRN
Status: DISCONTINUED | OUTPATIENT
Start: 2022-05-09 | End: 2022-05-09

## 2022-05-09 RX ORDER — OXYCODONE HYDROCHLORIDE 5 MG/1
5 TABLET ORAL EVERY 4 HOURS PRN
Qty: 20 TABLET | Refills: 0 | Status: SHIPPED | OUTPATIENT
Start: 2022-05-09 | End: 2022-10-11

## 2022-05-09 RX ORDER — CALCIUM CARBONATE 500 MG/1
500 TABLET, CHEWABLE ORAL 3 TIMES DAILY PRN
Status: DISCONTINUED | OUTPATIENT
Start: 2022-05-09 | End: 2022-05-11 | Stop reason: HOSPADM

## 2022-05-09 RX ORDER — CALCIUM CARBONATE 500 MG/1
500 TABLET, CHEWABLE ORAL DAILY PRN
Status: CANCELLED | OUTPATIENT
Start: 2022-05-09

## 2022-05-09 RX ORDER — MAGNESIUM OXIDE 400 MG/1
400 TABLET ORAL 2 TIMES DAILY
Status: COMPLETED | OUTPATIENT
Start: 2022-05-09 | End: 2022-05-10

## 2022-05-09 RX ADMIN — OXYCODONE HYDROCHLORIDE 5 MG: 5 TABLET ORAL at 06:23

## 2022-05-09 RX ADMIN — VANCOMYCIN HYDROCHLORIDE 1500 MG: 500 INJECTION, POWDER, LYOPHILIZED, FOR SOLUTION INTRAVENOUS at 17:26

## 2022-05-09 RX ADMIN — OXYCODONE HYDROCHLORIDE 5 MG: 5 TABLET ORAL at 14:13

## 2022-05-09 RX ADMIN — ACETAMINOPHEN 650 MG: 325 TABLET ORAL at 18:18

## 2022-05-09 RX ADMIN — OXCARBAZEPINE 600 MG: 600 TABLET, FILM COATED ORAL at 10:29

## 2022-05-09 RX ADMIN — ACETAMINOPHEN 650 MG: 325 TABLET ORAL at 02:31

## 2022-05-09 RX ADMIN — VANCOMYCIN HYDROCHLORIDE 1500 MG: 500 INJECTION, POWDER, LYOPHILIZED, FOR SOLUTION INTRAVENOUS at 04:20

## 2022-05-09 RX ADMIN — HYDRALAZINE HYDROCHLORIDE 10 MG: 20 INJECTION INTRAMUSCULAR; INTRAVENOUS at 23:39

## 2022-05-09 RX ADMIN — METRONIDAZOLE 500 MG: 500 INJECTION, SOLUTION INTRAVENOUS at 21:02

## 2022-05-09 RX ADMIN — OXYCODONE HYDROCHLORIDE 5 MG: 5 TABLET ORAL at 02:31

## 2022-05-09 RX ADMIN — CEFEPIME HYDROCHLORIDE 2 G: 2 INJECTION, POWDER, FOR SOLUTION INTRAVENOUS at 20:25

## 2022-05-09 RX ADMIN — GABAPENTIN 600 MG: 600 TABLET, FILM COATED ORAL at 14:13

## 2022-05-09 RX ADMIN — OXYCODONE HYDROCHLORIDE 10 MG: 10 TABLET ORAL at 18:18

## 2022-05-09 RX ADMIN — ACETAMINOPHEN 650 MG: 325 TABLET ORAL at 22:18

## 2022-05-09 RX ADMIN — ACETAMINOPHEN 650 MG: 325 TABLET ORAL at 10:34

## 2022-05-09 RX ADMIN — POTASSIUM CHLORIDE 20 MEQ: 750 TABLET, EXTENDED RELEASE ORAL at 10:28

## 2022-05-09 RX ADMIN — ONDANSETRON 4 MG: 4 TABLET, ORALLY DISINTEGRATING ORAL at 14:16

## 2022-05-09 RX ADMIN — LEVETIRACETAM 1000 MG: 500 TABLET, FILM COATED ORAL at 10:28

## 2022-05-09 RX ADMIN — GABAPENTIN 600 MG: 600 TABLET, FILM COATED ORAL at 10:28

## 2022-05-09 RX ADMIN — OXCARBAZEPINE 300 MG: 150 TABLET, FILM COATED ORAL at 21:01

## 2022-05-09 RX ADMIN — DOCUSATE SODIUM 50 MG AND SENNOSIDES 8.6 MG 1 TABLET: 8.6; 5 TABLET, FILM COATED ORAL at 10:34

## 2022-05-09 RX ADMIN — CEFEPIME HYDROCHLORIDE 2 G: 2 INJECTION, POWDER, FOR SOLUTION INTRAVENOUS at 10:25

## 2022-05-09 RX ADMIN — Medication 400 MG: at 20:24

## 2022-05-09 RX ADMIN — METRONIDAZOLE 500 MG: 500 INJECTION, SOLUTION INTRAVENOUS at 03:08

## 2022-05-09 RX ADMIN — SIMVASTATIN 40 MG: 40 TABLET, FILM COATED ORAL at 21:02

## 2022-05-09 RX ADMIN — ACETAMINOPHEN 650 MG: 325 TABLET ORAL at 14:13

## 2022-05-09 RX ADMIN — OXYCODONE HYDROCHLORIDE 5 MG: 5 TABLET ORAL at 10:34

## 2022-05-09 RX ADMIN — CALCIUM CARBONATE (ANTACID) CHEW TAB 500 MG 500 MG: 500 CHEW TAB at 11:34

## 2022-05-09 RX ADMIN — ACETAMINOPHEN 650 MG: 325 TABLET ORAL at 06:23

## 2022-05-09 RX ADMIN — GABAPENTIN 600 MG: 600 TABLET, FILM COATED ORAL at 20:24

## 2022-05-09 RX ADMIN — HYDROCHLOROTHIAZIDE 25 MG: 25 TABLET ORAL at 10:28

## 2022-05-09 RX ADMIN — OXYCODONE HYDROCHLORIDE 10 MG: 10 TABLET ORAL at 22:18

## 2022-05-09 RX ADMIN — CEFEPIME HYDROCHLORIDE 2 G: 2 INJECTION, POWDER, FOR SOLUTION INTRAVENOUS at 02:31

## 2022-05-09 RX ADMIN — METRONIDAZOLE 500 MG: 500 INJECTION, SOLUTION INTRAVENOUS at 11:34

## 2022-05-09 RX ADMIN — CALCIUM CARBONATE (ANTACID) CHEW TAB 500 MG 500 MG: 500 CHEW TAB at 22:18

## 2022-05-09 RX ADMIN — LEVETIRACETAM 1000 MG: 500 TABLET, FILM COATED ORAL at 20:24

## 2022-05-09 RX ADMIN — Medication 400 MG: at 10:28

## 2022-05-09 RX ADMIN — DOCUSATE SODIUM 50 MG AND SENNOSIDES 8.6 MG 1 TABLET: 8.6; 5 TABLET, FILM COATED ORAL at 20:24

## 2022-05-09 ASSESSMENT — ACTIVITIES OF DAILY LIVING (ADL)
ADLS_ACUITY_SCORE: 20
ADLS_ACUITY_SCORE: 5
ADLS_ACUITY_SCORE: 20
ADLS_ACUITY_SCORE: 5
DEPENDENT_IADLS:: INDEPENDENT
ADLS_ACUITY_SCORE: 5
ADLS_ACUITY_SCORE: 5
ADLS_ACUITY_SCORE: 20
ADLS_ACUITY_SCORE: 5
ADLS_ACUITY_SCORE: 5
ADLS_ACUITY_SCORE: 20
ADLS_ACUITY_SCORE: 5
ADLS_ACUITY_SCORE: 20
ADLS_ACUITY_SCORE: 5
ADLS_ACUITY_SCORE: 5
ADLS_ACUITY_SCORE: 20
ADLS_ACUITY_SCORE: 20
ADLS_ACUITY_SCORE: 5
ADLS_ACUITY_SCORE: 20

## 2022-05-09 ASSESSMENT — VISUAL ACUITY
OU: BASELINE
OU: BASELINE

## 2022-05-09 NOTE — CONSULTS
Care Management Initial Consult    General Information  Assessment completed with:  Patient, Elmo  Type of CM/SW Visit: Offer D/C Planning    Primary Care Provider verified and updated as needed:  Yes. Tamia Yeh NP. [facility].  Readmission within the last 30 days:  Post-op wound infection.   Reason for Consult: discharge planning  Advance Care Planning:  Not addressed. No documents on file.        Communication Assessment  Patient's communication style: spoken language (English or Bilingual)    Hearing Difficulty or Deaf: no   Wear Glasses or Blind: no    Cognitive  Cognitive/Neuro/Behavioral:  WDL  Level of Consciousness: alert  Arousal Level: opens eyes spontaneously  Orientation: oriented x 4  Mood/Behavior: calm  Best Language: 0 - No aphasia  Speech: clear    Living Environment:   People in home: child(esther), adult     Current living Arrangements:        Able to return to prior arrangements: yes      Family/Social Support:  Care provided by: self  Provides care for:              Description of Support System:  Adult children, 20 year old son.        Current Resources:   Patient receiving home care services: No   Community Resources: None  Equipment currently used at home: none  Supplies currently used at home:      Employment/Financial:  Employment Status:       Financial Concerns:         Lifestyle & Psychosocial Needs:  Social Determinants of Health     Tobacco Use: High Risk     Smoking Tobacco Use: Current Every Day Smoker     Smokeless Tobacco Use: Former User   Alcohol Use: Not on file   Financial Resource Strain: Not on file   Food Insecurity: Not on file   Transportation Needs: Not on file   Physical Activity: Not on file   Stress: Not on file   Social Connections: Not on file   Intimate Partner Violence: Not on file   Depression: Not at risk     PHQ-2 Score: 0   Housing Stability: Not on file       Functional Status:  Prior to admission patient needed assistance:  Pt reports he  was independent prior to admission; managed his own meds.   Dependent ADLs:: Independent  Dependent IADLs:: Independent     Mental Health Status:        Chemical Dependency Status:            Values/Beliefs:  Spiritual, Cultural Beliefs, [employer]ism Practices, Values that affect care:              Additional Information:  Pt with trigeminal neuralgia, s/p right microvascular decompression on 04/07/2022. Presents with wound infection. Wound washout on 5-07.     In 6A Discharge Rounds Nicole Thomas NP, reported pt with history of a microvascular decompression; now with wound drainage. On 3 IV antibiotics. Final Infectious Disease recommendations pending. Anticipate pt will need to discharge on IV antibiotics with a PICC line. Up independently.  Pt on Vanco, Cefepime & Flagyl IV. Referral sent to  Home Infusion for home IV antibiotics.  Introduced myself to pt and explained I help with discharge planning. Pt alert & pleasant. He was aware of plan for IVs after discharge. He is motivated to learn. Informed him I made a referral to  Home [facility] and they will check his insurance coverage. Pt wanted to make sure they were in-network. Informed him if [facility] is not in-network there are other home infusion agencies we can refer to.   Informed pt we start the IV teaching prior to discharge.   Pt was independent prior to admission. He managed his own meds. He lives with his 20 year old son. Pt said his son can help him after discharge.   Pt said he wasn't feeling good last night, he was sick to his stomach and occasionally felt lightheaded. He wasn't sure if it was his oral meds or the IV antibiotics. Pt said he was on the same oral meds at home. Pt said the doctors were aware he didn't feel well last night.     Confirmed home address and phone number.  Pt's primary is Tamia Yeh NP.    Called [facility] and informed them referral was made to [facility]; anticipate discharge soon when antibiotic plan is known.     Plan  --- Home  "Infusion checking insurance coverage for home IVs.  ---[facility] for IV teaching.   ---RNCC will continue to follow for discharge planning.       3:10pm Addendum:  Per [facility]: \"Pt has coverage for iv abx through their BCBS plan. Pt has met their deductible and OOP so he should be covered at 100%.\"        Marquita Kc RN Care Coordinator  [address], [facility]      [facility]  Phone:  [phone]    "

## 2022-05-09 NOTE — PLAN OF CARE
PT: At this time pt is moving IND/safely, has met all inpatient PT goals, safe to discharge home, PT orders completed.  Continue to encourage ambulation in hallway.    Physical Therapy Discharge Summary    Reason for therapy discharge:    All goals and outcomes met, no further needs identified.    Progress towards therapy goal(s). See goals on Care Plan in [facility] electronic health record for goal details.  Goals met    Therapy recommendation(s):    No further therapy is recommended.

## 2022-05-09 NOTE — PLAN OF CARE
Vitals: VSS  Neuros: Intact  IV: SLd between IV antibiotics   Resp/trach: WNL  Diet: Regular - Good po. Denies nausea.   Bowel status: Last BM PTA - Senna given   : Spontaneously   Skin: Incision AUGUSTIN   Pain: Incisional pain controlled with tylenol and oxycodone  Activity: Indep  Plan: Waiting ID/PICC plan. PICC [facility] tomorrow at 1000

## 2022-05-09 NOTE — PROGRESS NOTES
[facility], [place]   Neurosurgery Progress Note:    Assessment: Jossue Torres is a 51 year old male with history of right V3 trigeminal neuralgia s/p right retrosigmoid microvascular decompression of CN V who presented to the [facility] ED with purulent discharge from his wound on 5/6.  He was taken directly from the ED to the OR for wound culture and washout with removal of the surgical plate.  Postoperatively, he was admitted to the  Neuro ICU and has remained clinically stable.  He transferred to  on 5/8.  ID recommended continuing broad-spectrum antibiotic coverage with vancomycin, cefepime, and Flagyl.      Plan:  - Serial neuro exams  - Pain control  - HOB > 30 degrees  - Regular diet  - Monitor right postauricular incision  - Consult Infectious Disease   - Continue vancomycin, cefepime, and Flagyl   - Pending final antibiotic plan  - Consult for PICC placement  - F/u AM ESR and CRP  - Bowel regimen  - PRN antiemetics  - PT/OT  - SCDs for DVT proph    Greatly appreciate the help from PT/OT in caring for Jossue Torres    -----------------------------------  Quang Dietrich M.D.  Neurosurgery Resident, PGY-3    Please contact neurosurgery resident on call with questions.    Dial * * [phone], enter [phone] when prompted.        Interval History/Subjective: Antibiotics broadened to include Flagyl.  Otherwise remains stable.  Pending PICC placement and final antibiotic plan.    Objective:   Temp:  [96.4  F (35.8  C)-98.1  F (36.7  C)] 96.4  F (35.8  C)  Pulse:  [65-99] 81  Resp:  [16] 16  BP: (125-150)/(67-94) 125/86  SpO2:  [96 %-99 %] 96 %  I/O last 3 completed shifts:  In: 1145 [P.O.:720; I.V.:425]  Out: -     Gen: Appears comfortable, NAD  Wound: Incision, clean, dry, intact without strikethrough  Neurologic:  - Alert & Oriented to person, place, time, and situation  - Follows commands briskly  - Speech fluent, spontaneous. No aphasia or dysarthria.  - No gaze preference. No apparent  hemineglect.  - PERRL, EOMI  - Strong brow raise, eye closure, and smile  - Face symmetric with sensation intact to light touch  - Trapezii and sternocleidomastoid muscles 5/5 bilaterally  - No pronator drift     Del Tr Bi WE WF Gr   R 5 5 5 5 5 5   L 5 5 5 5 5 5    HF KE KF DF PF EHL   R 5 5 5 5 5 5   L 5 5 5 5 5 5     Sensation intact and symmetric to light touch throughout    LABS  Recent Labs   Lab Test 05/08/22  0727 05/06/22  1819 04/08/22  0413   WBC 10.0 9.7 12.0*   HGB 13.7 14.9 13.8   MCV 99 98 97    316 229       Recent Labs   Lab Test 05/08/22  2108 05/08/22 0727 05/07/22  0453 05/06/22  2201 05/06/22 1819   NA  --  140 138  --  137   POTASSIUM  --  4.0 3.9  --  4.0   CHLORIDE  --  108 106  --  103   CO2  --  24 24  --  28   BUN  --  24 17  --  18   CR  --  1.23 1.16  --  1.23   ANIONGAP  --  8 8  --  6   ANN MARIE  --  8.9 9.2  --  9.6   * 98 130*   < > 91    < > = values in this interval not displayed.       IMAGING  No results found for this or any previous visit (from the past 24 hour(s)).

## 2022-05-09 NOTE — PROGRESS NOTES
Pt has coverage for iv abx through their BCBS plan. Pt has met their deductible and OOP so he should be covered at 100%.             Please contact Intake with any questions, [phone] or In Basket pool, [facility] ([phone]).

## 2022-05-09 NOTE — PLAN OF CARE
Goal Outcome Evaluation:    Plan of Care Reviewed With: patient     Overall Patient Progress: no change      Status: POD#3 for Retromastoid wound washout. History of decompression or trigeminal nerve   Vitals: VSS  Neuros: Intact  IV: PIV @ TKO with abx  Resp/trach: RA  Diet: Regular  Bowel status: passing gas, LBM PTA, declines miralax  : voiding adequately   Skin: incision covered   Pain: head incisional pain, dilaudid x1, oxy and tylenol q4h  Activity: up independently  Plan: Pain management, PICC placement for long term abx  Updates this shift: Nausea last evening uncontrolled with zofran and compazine, MD notified and one time dose of reglan given, resolved.

## 2022-05-10 ENCOUNTER — APPOINTMENT (OUTPATIENT)
Dept: EDUCATION SERVICES | Facility: CLINIC | Age: 52
End: 2022-05-10
Attending: NEUROLOGICAL SURGERY
Payer: COMMERCIAL

## 2022-05-10 ENCOUNTER — HOME INFUSION (PRE-WILLOW HOME INFUSION) (OUTPATIENT)
Dept: PHARMACY | Facility: CLINIC | Age: 52
End: 2022-05-10

## 2022-05-10 LAB
ANION GAP SERPL CALCULATED.3IONS-SCNC: 6 MMOL/L (ref 3–14)
BACTERIA WND CULT: ABNORMAL
BUN SERPL-MCNC: 19 MG/DL (ref 7–30)
CALCIUM SERPL-MCNC: 9.1 MG/DL (ref 8.5–10.1)
CHLORIDE BLD-SCNC: 103 MMOL/L (ref 94–109)
CO2 SERPL-SCNC: 28 MMOL/L (ref 20–32)
CREAT SERPL-MCNC: 1.12 MG/DL (ref 0.66–1.25)
ERYTHROCYTE [DISTWIDTH] IN BLOOD BY AUTOMATED COUNT: 12.1 % (ref 10–15)
GFR SERPL CREATININE-BSD FRML MDRD: 79 ML/MIN/1.73M2
GLUCOSE BLD-MCNC: 112 MG/DL (ref 70–99)
HCT VFR BLD AUTO: 36.5 % (ref 40–53)
HGB BLD-MCNC: 12.4 G/DL (ref 13.3–17.7)
MAGNESIUM SERPL-MCNC: 2.1 MG/DL (ref 1.6–2.3)
MCH RBC QN AUTO: 32.3 PG (ref 26.5–33)
MCHC RBC AUTO-ENTMCNC: 34 G/DL (ref 31.5–36.5)
MCV RBC AUTO: 95 FL (ref 78–100)
PHOSPHATE SERPL-MCNC: 3.8 MG/DL (ref 2.5–4.5)
PLATELET # BLD AUTO: 242 10E3/UL (ref 150–450)
POTASSIUM BLD-SCNC: 3.7 MMOL/L (ref 3.4–5.3)
RBC # BLD AUTO: 3.84 10E6/UL (ref 4.4–5.9)
SODIUM SERPL-SCNC: 137 MMOL/L (ref 133–144)
WBC # BLD AUTO: 7.7 10E3/UL (ref 4–11)

## 2022-05-10 PROCEDURE — 272N000201 ZZ HC ADHESIVE SKIN CLOSURE, DERMABOND

## 2022-05-10 PROCEDURE — 272N000103 HC INTRODUCER MICRO SET

## 2022-05-10 PROCEDURE — 80048 BASIC METABOLIC PNL TOTAL CA: CPT | Performed by: STUDENT IN AN ORGANIZED HEALTH CARE EDUCATION/TRAINING PROGRAM

## 2022-05-10 PROCEDURE — 36415 COLL VENOUS BLD VENIPUNCTURE: CPT | Performed by: STUDENT IN AN ORGANIZED HEALTH CARE EDUCATION/TRAINING PROGRAM

## 2022-05-10 PROCEDURE — 84100 ASSAY OF PHOSPHORUS: CPT | Performed by: STUDENT IN AN ORGANIZED HEALTH CARE EDUCATION/TRAINING PROGRAM

## 2022-05-10 PROCEDURE — 99233 SBSQ HOSP IP/OBS HIGH 50: CPT | Performed by: INTERNAL MEDICINE

## 2022-05-10 PROCEDURE — 250N000013 HC RX MED GY IP 250 OP 250 PS 637: Performed by: NEUROLOGICAL SURGERY

## 2022-05-10 PROCEDURE — 250N000013 HC RX MED GY IP 250 OP 250 PS 637: Performed by: STUDENT IN AN ORGANIZED HEALTH CARE EDUCATION/TRAINING PROGRAM

## 2022-05-10 PROCEDURE — 250N000011 HC RX IP 250 OP 636: Performed by: STUDENT IN AN ORGANIZED HEALTH CARE EDUCATION/TRAINING PROGRAM

## 2022-05-10 PROCEDURE — 272N000019 HC KIT OPEN ENDED DOUBLE LUMEN

## 2022-05-10 PROCEDURE — 258N000003 HC RX IP 258 OP 636: Performed by: NEUROLOGICAL SURGERY

## 2022-05-10 PROCEDURE — 250N000013 HC RX MED GY IP 250 OP 250 PS 637: Performed by: NURSE PRACTITIONER

## 2022-05-10 PROCEDURE — 83735 ASSAY OF MAGNESIUM: CPT | Performed by: STUDENT IN AN ORGANIZED HEALTH CARE EDUCATION/TRAINING PROGRAM

## 2022-05-10 PROCEDURE — 85027 COMPLETE CBC AUTOMATED: CPT | Performed by: STUDENT IN AN ORGANIZED HEALTH CARE EDUCATION/TRAINING PROGRAM

## 2022-05-10 PROCEDURE — 120N000002 HC R&B MED SURG/OB UMMC

## 2022-05-10 PROCEDURE — 272N000278 HC DEVICE 5FR SECURACATH

## 2022-05-10 PROCEDURE — 250N000011 HC RX IP 250 OP 636: Performed by: NEUROLOGICAL SURGERY

## 2022-05-10 PROCEDURE — 36569 INSJ PICC 5 YR+ W/O IMAGING: CPT

## 2022-05-10 PROCEDURE — 272N000451 HC KIT SHRLOCK 5FR POWER PICC DOUBLE LUMEN

## 2022-05-10 RX ORDER — POTASSIUM CHLORIDE 750 MG/1
20 TABLET, EXTENDED RELEASE ORAL ONCE
Status: COMPLETED | OUTPATIENT
Start: 2022-05-10 | End: 2022-05-10

## 2022-05-10 RX ORDER — HEPARIN SODIUM,PORCINE 10 UNIT/ML
5-20 VIAL (ML) INTRAVENOUS
Status: DISCONTINUED | OUTPATIENT
Start: 2022-05-10 | End: 2022-05-11 | Stop reason: HOSPADM

## 2022-05-10 RX ORDER — ELETRIPTAN HYDROBROMIDE 40 MG/1
40 TABLET, FILM COATED ORAL ONCE
Status: COMPLETED | OUTPATIENT
Start: 2022-05-10 | End: 2022-05-10

## 2022-05-10 RX ORDER — LIDOCAINE 40 MG/G
CREAM TOPICAL
Status: DISCONTINUED | OUTPATIENT
Start: 2022-05-10 | End: 2022-05-11 | Stop reason: HOSPADM

## 2022-05-10 RX ORDER — LINEZOLID 600 MG/1
600 TABLET, FILM COATED ORAL EVERY 12 HOURS SCHEDULED
Status: DISCONTINUED | OUTPATIENT
Start: 2022-05-10 | End: 2022-05-11 | Stop reason: HOSPADM

## 2022-05-10 RX ORDER — HEPARIN SODIUM,PORCINE 10 UNIT/ML
5-20 VIAL (ML) INTRAVENOUS EVERY 24 HOURS
Status: DISCONTINUED | OUTPATIENT
Start: 2022-05-10 | End: 2022-05-11 | Stop reason: HOSPADM

## 2022-05-10 RX ADMIN — SIMVASTATIN 40 MG: 40 TABLET, FILM COATED ORAL at 21:03

## 2022-05-10 RX ADMIN — OXYCODONE HYDROCHLORIDE 10 MG: 10 TABLET ORAL at 10:02

## 2022-05-10 RX ADMIN — METRONIDAZOLE 500 MG: 500 INJECTION, SOLUTION INTRAVENOUS at 03:30

## 2022-05-10 RX ADMIN — PROCHLORPERAZINE MALEATE 10 MG: 10 TABLET ORAL at 06:33

## 2022-05-10 RX ADMIN — POTASSIUM CHLORIDE 20 MEQ: 750 TABLET, EXTENDED RELEASE ORAL at 10:08

## 2022-05-10 RX ADMIN — HYDROCHLOROTHIAZIDE 25 MG: 25 TABLET ORAL at 10:04

## 2022-05-10 RX ADMIN — MAGNESIUM HYDROXIDE 30 ML: 400 SUSPENSION ORAL at 00:09

## 2022-05-10 RX ADMIN — Medication 10 ML: at 21:04

## 2022-05-10 RX ADMIN — ACETAMINOPHEN 650 MG: 325 TABLET ORAL at 02:22

## 2022-05-10 RX ADMIN — ACETAMINOPHEN 650 MG: 325 TABLET ORAL at 10:02

## 2022-05-10 RX ADMIN — DOCUSATE SODIUM 50 MG AND SENNOSIDES 8.6 MG 1 TABLET: 8.6; 5 TABLET, FILM COATED ORAL at 21:03

## 2022-05-10 RX ADMIN — OXYCODONE HYDROCHLORIDE 10 MG: 10 TABLET ORAL at 02:22

## 2022-05-10 RX ADMIN — OXYCODONE HYDROCHLORIDE 10 MG: 10 TABLET ORAL at 18:16

## 2022-05-10 RX ADMIN — CEFEPIME HYDROCHLORIDE 2 G: 2 INJECTION, POWDER, FOR SOLUTION INTRAVENOUS at 11:47

## 2022-05-10 RX ADMIN — ONDANSETRON 8 MG: 4 TABLET, ORALLY DISINTEGRATING ORAL at 07:23

## 2022-05-10 RX ADMIN — Medication 400 MG: at 21:03

## 2022-05-10 RX ADMIN — GABAPENTIN 600 MG: 600 TABLET, FILM COATED ORAL at 13:58

## 2022-05-10 RX ADMIN — ONDANSETRON 4 MG: 4 TABLET, ORALLY DISINTEGRATING ORAL at 01:24

## 2022-05-10 RX ADMIN — LEVETIRACETAM 1000 MG: 500 TABLET, FILM COATED ORAL at 10:03

## 2022-05-10 RX ADMIN — LEVETIRACETAM 1000 MG: 500 TABLET, FILM COATED ORAL at 21:03

## 2022-05-10 RX ADMIN — GABAPENTIN 600 MG: 600 TABLET, FILM COATED ORAL at 10:04

## 2022-05-10 RX ADMIN — LINEZOLID 600 MG: 600 TABLET, FILM COATED ORAL at 14:59

## 2022-05-10 RX ADMIN — VANCOMYCIN HYDROCHLORIDE 1500 MG: 500 INJECTION, POWDER, LYOPHILIZED, FOR SOLUTION INTRAVENOUS at 04:51

## 2022-05-10 RX ADMIN — METRONIDAZOLE 500 MG: 500 INJECTION, SOLUTION INTRAVENOUS at 12:47

## 2022-05-10 RX ADMIN — CEFEPIME HYDROCHLORIDE 2 G: 2 INJECTION, POWDER, FOR SOLUTION INTRAVENOUS at 02:21

## 2022-05-10 RX ADMIN — Medication 400 MG: at 10:04

## 2022-05-10 RX ADMIN — ACETAMINOPHEN 650 MG: 325 TABLET ORAL at 18:16

## 2022-05-10 RX ADMIN — ELETRIPTAN HYDROBROMIDE 40 MG: 40 TABLET, FILM COATED ORAL at 05:47

## 2022-05-10 RX ADMIN — OXCARBAZEPINE 600 MG: 600 TABLET, FILM COATED ORAL at 10:04

## 2022-05-10 RX ADMIN — HYDROMORPHONE HYDROCHLORIDE 0.2 MG: 0.2 INJECTION, SOLUTION INTRAMUSCULAR; INTRAVENOUS; SUBCUTANEOUS at 03:49

## 2022-05-10 ASSESSMENT — ACTIVITIES OF DAILY LIVING (ADL)
ADLS_ACUITY_SCORE: 20
ADLS_ACUITY_SCORE: 22
ADLS_ACUITY_SCORE: 20
ADLS_ACUITY_SCORE: 22
ADLS_ACUITY_SCORE: 20
ADLS_ACUITY_SCORE: 22
ADLS_ACUITY_SCORE: 20
ADLS_ACUITY_SCORE: 22
ADLS_ACUITY_SCORE: 20
ADLS_ACUITY_SCORE: 22
ADLS_ACUITY_SCORE: 20
ADLS_ACUITY_SCORE: 20
ADLS_ACUITY_SCORE: 22

## 2022-05-10 ASSESSMENT — VISUAL ACUITY
OU: NORMAL ACUITY
OU: NORMAL ACUITY

## 2022-05-10 NOTE — PROGRESS NOTES
[facility], [place]   Neurosurgery Progress Note:    Assessment: Jossue Torres is a 51 year old male with history of right V3 trigeminal neuralgia s/p right retrosigmoid microvascular decompression of CN V who presented to the [facility] ED with purulent discharge from his wound on 5/6.  He was taken directly from the ED to the OR for wound culture and washout with removal of the surgical plate.  Postoperatively, he was admitted to the  Neuro ICU and has remained clinically stable.  He transferred to  on 5/8.  ID recommended continuing broad-spectrum antibiotic coverage with vancomycin, cefepime, and Flagyl.    Thus far, cultures are notable for growth of Staph epidermidis and P acnes.  Final ID antibiotic plan is pending.  We anticipate that the patient will require a PICC for prolonged IV antibiotic administration.    Plan:  - Serial neuro exams  - Pain control  - HOB > 30 degrees  - Regular diet  - Monitor right postauricular incision  - Consult Infectious Disease   - Continue vancomycin, cefepime, and Flagyl   - Pending final antibiotic plan  - Consult for PICC placement pending ID agreement  - F/u AM ESR and CRP  - Bowel regimen  - PRN antiemetics  - PT/OT: cleared for home discharge  - SCDs for DVT proph    Greatly appreciate the help from PT/OT in caring for Jossue Torres    -----------------------------------  Quang Dietrich M.D.  Neurosurgery Resident, PGY-3    Please contact neurosurgery resident on call with questions.    Dial * * *[phone], enter [phone] when prompted.        Interval History/Subjective: No acute events yesterday. Pending final ID antibiotic plan. Wound cultures notable for Staph epidermidis and P acnes.    Objective:   Temp:  [96.8  F (36  C)-98.5  F (36.9  C)] 96.8  F (36  C)  Pulse:  [67-88] 77  Resp:  [16-18] 16  BP: ()/(72-97) 137/97  SpO2:  [94 %-98 %] 98 %  I/O last 3 completed shifts:  In: 1470 [P.O.:820; I.V.:650]  Out: -     Gen: Appears comfortable,  NAD  Wound: Incision, clean, dry, intact without strikethrough  Neurologic:  - Alert & Oriented to person, place, time, and situation  - Follows commands briskly  - Speech fluent, spontaneous. No aphasia or dysarthria.  - No gaze preference. No apparent hemineglect.  - PERRL, EOMI  - Strong brow raise, eye closure, and smile  - Face symmetric with sensation intact to light touch  - Trapezii and sternocleidomastoid muscles 5/5 bilaterally  - No pronator drift     Del Tr Bi WE WF Gr   R 5 5 5 5 5 5   L 5 5 5 5 5 5    HF KE KF DF PF EHL   R 5 5 5 5 5 5   L 5 5 5 5 5 5     Sensation intact and symmetric to light touch throughout    LABS  Recent Labs   Lab Test 05/09/22  0531 05/08/22  0727 05/06/22  1819   WBC 7.3 10.0 9.7   HGB 12.2* 13.7 14.9   MCV 97 99 98    275 316       Recent Labs   Lab Test 05/09/22  0531 05/08/22  2108 05/08/22  0727 05/07/22  0453     --  140 138   POTASSIUM 3.5  --  4.0 3.9   CHLORIDE 108  --  108 106   CO2 25  --  24 24   BUN 17  --  24 17   CR 1.15  --  1.23 1.16   ANIONGAP 5  --  8 8   ANN MARIE 8.6  --  8.9 9.2   * 131* 98 130*       IMAGING  No results found for this or any previous visit (from the past 24 hour(s)).

## 2022-05-10 NOTE — PLAN OF CARE
Status: POD#3 for Retromastoid wound washout. Hx of decompression of trigeminal nerve  Vitals: HTN, on RA.  Neuros: A&Ox4. Neuros intact.  IV: PIV SL between abx  Labs/Electrolytes: WNL  Resp/trach: WNL  Diet: Regular diet  Bowel status: No BM. Last BM PTA. Senna given. Pt refuses miralax and other PRN bowel meds.  : Voiding  Skin: R head incision, sutured, AUGUSTIN  Pain: Incisional pain managed w/ PRN tylenol and oxy  Activity: Up ad lesley  Plan: Pending final abx plan. Plan to discharge home w/ PICC and home infusion. PICC PLC tomorrow 5/10 @ 1000.

## 2022-05-10 NOTE — PLAN OF CARE
Vitals: VSS  Neuros: Intact  IV: PICC placed this shift. TKO between IV antibiotics   Resp/trach: WNL  Diet: Regular - Fair PO. Zofran given x 1.   Bowel status: Last BM 5/10  : Spontaneously   Skin: Incision AUGUSTIN   Pain: Incisional pain controlled with tylenol and oxycodone  Activity: Indep  Plan: Had PICC placed this am, PLC also completed

## 2022-05-10 NOTE — CONSULTS
Met with patient for PICC education and IV abx teaching. Talked about basic PICC cares/safety/when to call 911.     Demonstrated prepping saline syringes and flushing on PICC model. Patient able to teach back and demonstrate flush on demo line, clamping and unclamping at the appropriate times.     Taught how to administer IV abx via elastomeric device. Patient will need an extension put on his PICC before discharge, so he can self-administer PICC IV abx. Also taught IV abx push method. Patient did well with both methods.    Patient verbalized and demonstrated understanding.     Literature given: Handwashing and Skin Care, Caring for Your PICC at Home, Changing the End Cap, Flushing the Line with Heparin, Saline or Citrate, Instructions for IV Medicine Ball, Instructions for IV Push Medicine.

## 2022-05-10 NOTE — PROCEDURES
[facility]    Double Lumen PICC Placement    Date/Time: 5/10/2022 10:07 AM  Performed by: Tia Angulo RN  Authorized by: Grey Simeon MD   Indications: vascular access      UNIVERSAL PROTOCOL   Site Marked: Yes  Prior Images Obtained and Reviewed:  Yes  Required items: Required blood products, implants, devices and special equipment available    Patient identity confirmed:  Verbally with patient and arm band  NA - No sedation, light sedation, or local anesthesia  Confirmation Checklist:  Patient's identity using two indicators, relevant allergies, procedure was appropriate and matched the consent or emergent situation and correct equipment/implants were available  Time out: Immediately prior to the procedure a time out was called    Universal Protocol: the Joint Commission Universal Protocol was followed    Preparation: Patient was prepped and draped in usual sterile fashion       ANESTHESIA    Anesthesia: Local infiltration  Local Anesthetic:  Lidocaine 1% without epinephrine  Anesthetic Total (mL):  5      SEDATION    Patient Sedated: No        Preparation: skin prepped with ChloraPrep  Skin prep agent: skin prep agent completely dried prior to procedure  Sterile barriers: maximum sterile barriers were used: cap, mask, sterile gown, sterile gloves, and large sterile sheet  Hand hygiene: hand hygiene performed prior to central venous catheter insertion  Type of line used: PICC and Power PICC  Catheter type: double lumen  Lumen type: non-valved  Catheter size: 5 Fr  Brand: Bard  Lot number: [id number]  Placement method: venipuncture, MST, ultrasound and tip navigation system  Number of attempts: 1  Difficulty threading catheter: no  Successful placement: yes  Orientation: right    Location: basilic vein (vd 0.60 cm)  Arm circumference: adults 10 cm  Extremity circumference: 31  Visible catheter length: 2  Total catheter length: 44  Dressing  and securement: adhesive securement device, alcohol impregnated caps, blood cleaned with CHG, chlorhexidine disc applied, dressing applied, glue, line secured, securement device, site cleansed, sterile dressing applied, transparent dressing and transparent securement dressing  Post procedure assessment: blood return through all ports, free fluid flow and placement verified by 3CG technology  PROCEDURE   Patient Tolerance:  Patient tolerated the procedure well with no immediate complicationsDescribe Procedure: PICC ok to use

## 2022-05-10 NOTE — PROGRESS NOTES
Care Management Follow Up    Length of Stay (days): 4    Expected Discharge Date: 05/10/2022, pending antibiotic plan     Concerns to be Addressed:  discharge planning, home IV antibiotics   Patient plan of care discussed at interdisciplinary rounds: Yes    Anticipated Discharge Disposition:  Home, Home Infusion     Anticipated Discharge Services:  Home IV antibiotics  Anticipated Discharge DME: None    Patient/family educated on Medicare website which has current facility and service quality ratings:  NA  Education Provided on the Discharge Plan:  Yes  Patient/Family in Agreement with the Plan:  Yes    Referrals Placed by CM/SW:  Internal Clinic Care Coordination  Private pay costs discussed: Not applicable    Additional Information:  In 6A Discharge Rounds Nicole Thomas NP reported waiting for final antibiotic plan, will follow-up with Infectious Disease. PICC line placed today. Pt seen by Dominick Tejeda RN with [facility].     Spoke with pt's nurse, Tamica this afternoon. She reported Vanco is being discontinued and an oral antifungal med is being started. No decision yet on IVs.     Will need to add Home Infusion Orders on the AVS/Discharge Orders.       Marquita Kc RN Care Coordinator  [address], [facility]      [facility]  Phone:  [phone]

## 2022-05-10 NOTE — PROGRESS NOTES
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  Clinically very well-appearing and hemodynamically stable.  Cultures grew staph epi, cutibacterium acnes, and diphtheroids (swab).  Discussed operative findings with neurosurgery.  The graft was grossly infected and all hardware was removed. The bone did not appear infected. Overall, 2 week course of linezolid should be sufficient with adequate CNS penetration but would consider extending duration if an issues developed during treatment. Would recommend 2 week ID follow up with labs.    RECOMMENDATION:  1.  Stop vancomycin, cefepime, and metronidazole  2.  Start PO linezolid 600mg BID x14 days  3.  Please place 2 week ID follow upon discharge (message sent to our schedulers)    The patient's care was discussed with the Attending Physician, Dr. Guo, Patient and Primary team.    Richmond Jeong MD  [facility]  Infectious Disease Fellow  ______________________________________________________________________    Interval History:    Nursing notes reviewed, no acute events overnight.  Has been relatively stable since surgery.  He denies any pain or recurrence of his trigeminal neuralgia.  He did have a migraine last evening which responded to his typical treatment.  He has no neck stiffness or fevers. Denies any complaints.    Review of Systems   The 6 point Review of Systems is negative other than noted in the HPI or here.      Medications   I have reviewed this patient's current medications    Allergies   Allergies   Allergen Reactions     Nkda [No Known Drug Allergies]        Physical Exam    Vital Signs: Temp: (!) 96.3  F (35.7  C) Temp src: Oral BP: (!) 146/97 Pulse: 81   Resp: 16 SpO2: 98 % O2 Device: None (Room air)    Weight: 217 lbs 4.8 oz    Constitutional: awake, alert, cooperative, no apparent distress, and appears stated age  Eyes: Lids and lashes normal, pupils equal, round and reactive to light, extra ocular muscles intact, sclera clear, conjunctiva normal  Hematologic / Lymphatic: no cervical lymphadenopathy and no supraclavicular lymphadenopathy  Respiratory: No increased work of breathing, good air exchange, clear to auscultation bilaterally, no crackles or wheezing  Cardiovascular: Normal apical impulse, regular rate and rhythm, normal S1 and S2, no S3 or S4, and no murmur noted  Skin: surgical site well appearing without drainage or surrounding erythema  Neurologic: Awake, alert, oriented to name, place and time.  Cranial nerves II-XII are grossly intact.  Motor is 5 out of 5 bilaterally.      Data   Inflammatory Markers   Recent Labs   Lab Test 05/09/22  0759 05/09/22  0531 05/07/22  0833 05/06/22  1819 12/09/21  1441 12/09/21  1440   SED 12 13 10  --  4  --    CRP 12.0* 11.0* 12.0* 11.0*  --  <2.9        Hematology Studies   Recent Labs   Lab Test 05/10/22  0500 05/09/22  0531 05/08/22  0727 05/06/22  1819 04/08/22  0413 12/09/21  1441   WBC 7.7 7.3 10.0 9.7 12.0* 8.4   HGB 12.4* 12.2* 13.7 14.9 13.8 16.0   MCV 95 97 99 98 97 95    246 275 316 229 247       Metabolic Studies   Recent Labs   Lab Test 05/10/22  0500 05/09/22  0531 05/08/22  0727 05/07/22  0453 05/06/22  1819    138 140 138 137   POTASSIUM 3.7 3.5 4.0 3.9 4.0   CHLORIDE 103 108 108 106 103   CO2 28 25 24 24 28   BUN 19 17 24 17 18   CR 1.12 1.15 1.23 1.16 1.23   GFRESTIMATED 79 77 71 76 71       Hepatic Studies    Recent Labs   Lab Test 12/09/21  1440   BILITOTAL 0.4   ALKPHOS 104   ALBUMIN 3.7   AST 15   ALT 41

## 2022-05-10 NOTE — PROGRESS NOTES
[facility]     Received referral from Marquita Kc RNCC for IV antibiotics.  Benefits verified.  Pt has BCBS plan, he has met his deductible and OOP so he should be covered at 100%.  Spoke with patient to review home infusion services, review benefits and offer choice of providers.  Patient would like to use [facility] for home infusion.  Elmo is expected to dc soon and will be going home on IV antibiotics.  He has not done home IV therapy before however he has been to the [facility] for initial teaching.  Met with patient at bedside and provided him with information about [facility] services.  Explained about elastomeric pump and IV push administration methods and explained that a home nurse can provide additional teaching needed for self-administration.  Informed him about supplies and delivery of supplies, storage of medication, dosing times, plan for SNV and 24/7 availability of I staff while on IV therapy.     Elmo verbalized understanding of all information given.  He is willing and able to learn and manage home IV therapy and states his son will be able to assist.  Questions answered.  Plan for [facility] to provide home nurse visits after discharge.  [facility] Liaison will follow along to assist with discharge home with infusion needs.      Thank you for the referral.     DAYSI Byrd  [facility] Nurse Liaison   [email or web address]  Cell: [phone] M-F  [facility] Main: [phone]

## 2022-05-11 ENCOUNTER — DOCUMENTATION ONLY (OUTPATIENT)
Dept: NEUROSURGERY | Facility: CLINIC | Age: 52
End: 2022-05-11
Payer: COMMERCIAL

## 2022-05-11 VITALS
OXYGEN SATURATION: 97 % | BODY MASS INDEX: 30.42 KG/M2 | SYSTOLIC BLOOD PRESSURE: 143 MMHG | RESPIRATION RATE: 18 BRPM | HEART RATE: 73 BPM | WEIGHT: 217.3 LBS | DIASTOLIC BLOOD PRESSURE: 83 MMHG | HEIGHT: 71 IN | TEMPERATURE: 97.3 F

## 2022-05-11 LAB
ANION GAP SERPL CALCULATED.3IONS-SCNC: 3 MMOL/L (ref 3–14)
BACTERIA BLD CULT: NO GROWTH
BACTERIA BLD CULT: NO GROWTH
BACTERIA WND CULT: ABNORMAL
BUN SERPL-MCNC: 13 MG/DL (ref 7–30)
CALCIUM SERPL-MCNC: 8.6 MG/DL (ref 8.5–10.1)
CHLORIDE BLD-SCNC: 102 MMOL/L (ref 94–109)
CO2 SERPL-SCNC: 28 MMOL/L (ref 20–32)
CREAT SERPL-MCNC: 1.1 MG/DL (ref 0.66–1.25)
CRP SERPL-MCNC: 8.3 MG/L (ref 0–8)
ERYTHROCYTE [DISTWIDTH] IN BLOOD BY AUTOMATED COUNT: 12.2 % (ref 10–15)
ERYTHROCYTE [SEDIMENTATION RATE] IN BLOOD BY WESTERGREN METHOD: 13 MM/HR (ref 0–20)
GFR SERPL CREATININE-BSD FRML MDRD: 81 ML/MIN/1.73M2
GLUCOSE BLD-MCNC: 94 MG/DL (ref 70–99)
HCT VFR BLD AUTO: 35.9 % (ref 40–53)
HGB BLD-MCNC: 12.5 G/DL (ref 13.3–17.7)
MAGNESIUM SERPL-MCNC: 2 MG/DL (ref 1.6–2.3)
MCH RBC QN AUTO: 32.9 PG (ref 26.5–33)
MCHC RBC AUTO-ENTMCNC: 34.8 G/DL (ref 31.5–36.5)
MCV RBC AUTO: 95 FL (ref 78–100)
PHOSPHATE SERPL-MCNC: 3 MG/DL (ref 2.5–4.5)
PLATELET # BLD AUTO: 255 10E3/UL (ref 150–450)
POTASSIUM BLD-SCNC: 4.1 MMOL/L (ref 3.4–5.3)
RBC # BLD AUTO: 3.8 10E6/UL (ref 4.4–5.9)
SODIUM SERPL-SCNC: 133 MMOL/L (ref 133–144)
WBC # BLD AUTO: 7.9 10E3/UL (ref 4–11)

## 2022-05-11 PROCEDURE — 250N000013 HC RX MED GY IP 250 OP 250 PS 637: Performed by: NEUROLOGICAL SURGERY

## 2022-05-11 PROCEDURE — 250N000013 HC RX MED GY IP 250 OP 250 PS 637: Performed by: STUDENT IN AN ORGANIZED HEALTH CARE EDUCATION/TRAINING PROGRAM

## 2022-05-11 PROCEDURE — 85027 COMPLETE CBC AUTOMATED: CPT | Performed by: STUDENT IN AN ORGANIZED HEALTH CARE EDUCATION/TRAINING PROGRAM

## 2022-05-11 PROCEDURE — 85652 RBC SED RATE AUTOMATED: CPT | Performed by: STUDENT IN AN ORGANIZED HEALTH CARE EDUCATION/TRAINING PROGRAM

## 2022-05-11 PROCEDURE — 86140 C-REACTIVE PROTEIN: CPT | Performed by: STUDENT IN AN ORGANIZED HEALTH CARE EDUCATION/TRAINING PROGRAM

## 2022-05-11 PROCEDURE — 80048 BASIC METABOLIC PNL TOTAL CA: CPT | Performed by: STUDENT IN AN ORGANIZED HEALTH CARE EDUCATION/TRAINING PROGRAM

## 2022-05-11 PROCEDURE — 36592 COLLECT BLOOD FROM PICC: CPT | Performed by: STUDENT IN AN ORGANIZED HEALTH CARE EDUCATION/TRAINING PROGRAM

## 2022-05-11 PROCEDURE — 250N000013 HC RX MED GY IP 250 OP 250 PS 637: Performed by: NURSE PRACTITIONER

## 2022-05-11 PROCEDURE — 84100 ASSAY OF PHOSPHORUS: CPT | Performed by: STUDENT IN AN ORGANIZED HEALTH CARE EDUCATION/TRAINING PROGRAM

## 2022-05-11 PROCEDURE — 83735 ASSAY OF MAGNESIUM: CPT | Performed by: STUDENT IN AN ORGANIZED HEALTH CARE EDUCATION/TRAINING PROGRAM

## 2022-05-11 RX ORDER — ELETRIPTAN HYDROBROMIDE 40 MG/1
40 TABLET, FILM COATED ORAL ONCE
Status: COMPLETED | OUTPATIENT
Start: 2022-05-11 | End: 2022-05-11

## 2022-05-11 RX ORDER — MAGNESIUM OXIDE 400 MG/1
400 TABLET ORAL 2 TIMES DAILY
Status: DISCONTINUED | OUTPATIENT
Start: 2022-05-11 | End: 2022-05-11 | Stop reason: HOSPADM

## 2022-05-11 RX ORDER — POLYETHYLENE GLYCOL 3350 17 G/17G
17 POWDER, FOR SOLUTION ORAL DAILY
Qty: 14 PACKET | Refills: 0 | Status: SHIPPED | OUTPATIENT
Start: 2022-05-12 | End: 2022-05-26

## 2022-05-11 RX ORDER — LINEZOLID 600 MG/1
600 TABLET, FILM COATED ORAL EVERY 12 HOURS
Qty: 28 TABLET | Refills: 1 | Status: SHIPPED | OUTPATIENT
Start: 2022-05-11 | End: 2022-05-24

## 2022-05-11 RX ORDER — ELETRIPTAN HYDROBROMIDE 20 MG/1
20 TABLET, FILM COATED ORAL
Status: COMPLETED | OUTPATIENT
Start: 2022-05-11 | End: 2022-05-11

## 2022-05-11 RX ORDER — ELETRIPTAN HYDROBROMIDE 40 MG/1
40 TABLET, FILM COATED ORAL ONCE
Status: DISCONTINUED | OUTPATIENT
Start: 2022-05-11 | End: 2022-05-11

## 2022-05-11 RX ORDER — LEVETIRACETAM 1000 MG/1
1000 TABLET ORAL 2 TIMES DAILY
Qty: 4 TABLET | Refills: 0 | Status: SHIPPED | OUTPATIENT
Start: 2022-05-11 | End: 2022-06-24

## 2022-05-11 RX ADMIN — ELETRIPTAN HYDROBROMIDE 40 MG: 40 TABLET, FILM COATED ORAL at 01:06

## 2022-05-11 RX ADMIN — Medication 400 MG: at 08:18

## 2022-05-11 RX ADMIN — ACETAMINOPHEN 650 MG: 325 TABLET ORAL at 04:25

## 2022-05-11 RX ADMIN — HYDROCHLOROTHIAZIDE 25 MG: 25 TABLET ORAL at 08:17

## 2022-05-11 RX ADMIN — ELETRIPTAN HYDROBROMIDE 20 MG: 20 TABLET, FILM COATED ORAL at 09:38

## 2022-05-11 RX ADMIN — OXYCODONE HYDROCHLORIDE 5 MG: 5 TABLET ORAL at 04:25

## 2022-05-11 RX ADMIN — OXYCODONE HYDROCHLORIDE 5 MG: 5 TABLET ORAL at 08:16

## 2022-05-11 RX ADMIN — ACETAMINOPHEN 650 MG: 325 TABLET ORAL at 08:17

## 2022-05-11 RX ADMIN — OXYCODONE HYDROCHLORIDE 5 MG: 5 TABLET ORAL at 00:06

## 2022-05-11 RX ADMIN — ACETAMINOPHEN 650 MG: 325 TABLET ORAL at 00:06

## 2022-05-11 RX ADMIN — LINEZOLID 600 MG: 600 TABLET, FILM COATED ORAL at 02:28

## 2022-05-11 ASSESSMENT — ACTIVITIES OF DAILY LIVING (ADL)
ADLS_ACUITY_SCORE: 22
ADLS_ACUITY_SCORE: 20
ADLS_ACUITY_SCORE: 22
ADLS_ACUITY_SCORE: 22
ADLS_ACUITY_SCORE: 20
ADLS_ACUITY_SCORE: 22

## 2022-05-11 ASSESSMENT — VISUAL ACUITY
OU: NORMAL ACUITY
OU: NORMAL ACUITY

## 2022-05-11 NOTE — PROGRESS NOTES
[facility], [place]   Neurosurgery Progress Note:    Assessment: Jossue Torres is a 51 year old male with history of right V3 trigeminal neuralgia s/p right retrosigmoid microvascular decompression of CN V who presented to the [facility] ED with purulent discharge from his wound on 5/6.  He was taken directly from the ED to the OR for wound culture and washout with removal of the surgical plate.  Postoperatively, he was admitted to the  Neuro ICU and has remained clinically stable.  He transferred to  on 5/8.  ID recommended continuing broad-spectrum antibiotic coverage with vancomycin, cefepime, and Flagyl.    Thus far, cultures are notable for growth of Staph epidermidis and P acnes.  Final ID antibiotic plan is pending.  We anticipate that the patient will require a PICC for prolonged IV antibiotic administration.    Plan:  - Serial neuro exams  - Pain control  - HOB > 30 degrees  - Regular diet  - Monitor right postauricular incision  - Consult Infectious Disease   - Vancomycin, cefepime, and Flagyl stopped 5/10   - Linezolid until ID followup 2 weeks after discharge  - PICC placed 5/10  - F/u serial ESR and CRP  - Bowel regimen  - PRN antiemetics  - PT/OT: cleared for home discharge  - SCDs for DVT proph    Greatly appreciate the help from PT/OT in caring for Jossue Torres    -----------------------------------  Quang Dietrich M.D.  Neurosurgery Resident, PGY-3    Please contact neurosurgery resident on call with questions.    Dial * * *[phone], enter [phone] when prompted.        Interval History/Subjective: ID final recommendations for no IV antibiotics: linezolid PO BID x14 days until ID clinic followup.  PTA triptan ordered for migraine.    Objective:   Temp:  [96.3  F (35.7  C)-97.4  F (36.3  C)] 97.4  F (36.3  C)  Pulse:  [71-81] 77  Resp:  [16-18] 16  BP: (132-155)/(89-99) 132/89  SpO2:  [95 %-98 %] 97 %  I/O last 3 completed shifts:  In: 980 [P.O.:960; I.V.:20]  Out: -     Gen:  Appears comfortable, NAD  Wound: Incision, clean, dry, intact without strikethrough  Neurologic:  - Alert & Oriented to person, place, time, and situation  - Follows commands briskly  - Speech fluent, spontaneous. No aphasia or dysarthria.  - No gaze preference. No apparent hemineglect.  - PERRL, EOMI  - Strong brow raise, eye closure, and smile  - Face symmetric with sensation intact to light touch  - Trapezii and sternocleidomastoid muscles 5/5 bilaterally  - No pronator drift     Del Tr Bi WE WF Gr   R 5 5 5 5 5 5   L 5 5 5 5 5 5    HF KE KF DF PF EHL   R 5 5 5 5 5 5   L 5 5 5 5 5 5     Sensation intact and symmetric to light touch throughout    LABS  Recent Labs   Lab Test 05/10/22  0500 05/09/22  0531 05/08/22  0727   WBC 7.7 7.3 10.0   HGB 12.4* 12.2* 13.7   MCV 95 97 99    246 275       Recent Labs   Lab Test 05/10/22  0500 05/09/22  0531 05/08/22  2108 05/08/22  0727    138  --  140   POTASSIUM 3.7 3.5  --  4.0   CHLORIDE 103 108  --  108   CO2 28 25  --  24   BUN 19 17  --  24   CR 1.12 1.15  --  1.23   ANIONGAP 6 5  --  8   ANN MARIE 9.1 8.6  --  8.9   * 116* 131* 98       IMAGING  No results found for this or any previous visit (from the past 24 hour(s)).

## 2022-05-11 NOTE — PROGRESS NOTES
Prior Authorization Approval    Linezolid 600mg tabs  Date Initiated: 5/11/2022  Date Completed: 5/11/2022  Prior Auth Type: Clinical                Status: Approved    Effective Date: 05/11/2022 - 06/08/2022  Copay: 0.00     Filling Pharmacy: [facility] - [address]    Insurance: [employer] - Phone [phone] Fax [phone]  ID: [id number]  Case Number: [id number] / [id number]   Submitted Via: [place] [place]  [facility] Liaison  Ph: [phone] Pager: [phone]

## 2022-05-11 NOTE — PLAN OF CARE
Status: POD#4 for Retromastoid wound washout. Hx of decompression of trigeminal nerve  Vitals: VSS, on RA  Neuros: A&Ox4. Neuros intact.  IV: PIV SL. PICC HL.  Labs/Electrolytes: WNL  Resp/trach: WNL  Diet: Regular diet  Bowel status: LBM 5/10   : Voiding  Skin: R head incision, sutured, AUGUSTIN  Pain: Incisional pain managed w/ PRN tylenol and oxy  Activity: Up ad lesley  Plan: Discharge tomorrow AM w/ oral abx. Pull out PICC before discharge.

## 2022-05-11 NOTE — PLAN OF CARE
Vitals: VSS  Neuros: Intact  IV: PICC removed - dsg CDI   Resp/trach: WNL  Diet: Regular - Good po   Bowel status: Last BM 5/10  : Spontaneously   Skin: Incision AUGUSTIN   Pain: Incisional pain controlled with tylenol and oxycodone. Eletriptan given for migraine.   Activity: Indep  Plan: Pt is discharging home at this time. Discharge medication and instructions gone over with no further questions. Pt will  medications and meet Taxi out front.

## 2022-05-11 NOTE — PLAN OF CARE
Status: POD#5 for Retromastoid wound washout. Hx of decompression of trigeminal nerve  Vitals: VSS, on RA  Neuros: A&Ox4. Neuros intact.  IV: PIV SL. PICC HL.  Labs/Electrolytes: WNL  Resp/trach: WNL  Diet: Regular diet  Bowel status: LBM 5/11   : Voiding  Skin: R head incision, sutured, AUGUSTIN  Pain: Incisional pain managed w/ PRN tylenol and oxy. Migraine controlled with eletriptan.   Activity: Up ad lesley  Plan: Discharge today w/ oral abx. Pull out PICC before discharge

## 2022-05-11 NOTE — DISCHARGE SUMMARY
"[facility] Discharge Summary and Instructions    Jossue Torres MRN# [id number]   Age: 51 year old YOB: 1970     Date of Admission:  5/6/2022  Date of Discharge::  5/11/2022  Admitting Physician:  Grey Simeon MD  Discharge Physician:  Grey Simeon MD          Admission Diagnoses:   Postoperative wound infection [T81.49XA]          Discharge Diagnosis:     Postoperative wound infection [T81.49XA]     Clinically Significant Risk Factors Present on Admission                        Procedures:   5/7/2022  Retro-Mastoid wound washout           Brief History of Illness:   Jossue \"Elmo\" Melissa is a 50 yo male with PMH of trigeminal neuralgia who is 1 month s/p right microvascular decompression on 04/07/2022. He reports that starting yesterday morning after showering he noticed drainage from the inferior part of his right retroauricular surgical incision. He denies any other fevers, chills, nausea, vomiting, constipation/diarrhea, chest pain, shortness of breath, headaches, confusion, photophobia worsening neck stiffness, LOC, weakness, or numbness/tingling/paresthesias. Up until yesterday, his recovery had been progressing appropriately, and he hasn't had any of his pre-operative trigeminal neuralgia symptoms since his procedure. Of note, the last time he ate was 10AM this morning. Regarding his trigeminal symptoms, he reports since surgery he has not had any of his usual right sided V3 electrical pain. He has been slowly weaning his [place] related medications.            Hospital Course:   Patient underwent above-mentioned procedure on 5/7/2022, post-operatively he did quite well.  Infectious disease was consulted for antibiotic recommendations.  Gram stain from the OR revealed GPRs and GPCs, cultures positive for staph epi, cutibacterium acnes, and diphtheroids.  Patient initially treated with broad spectrum antibiotics, this was eventually narrowed to linezolid 600 mg twice daily for 14 " days.  Patient will follow-up with Infectious disease at that time.    Patient did not appear meningitic throughout his course, he remained medically and neurologically stable throughout his course.  Prior to discharge the patient was weaned from trileptal and neurontin for prior facial pain. Patient was mobilizing independently and pain was controlled with oral analgesia prior to discharge.  On POD #4 the patient was able to discharge home.              Discharge Medications:     Current Discharge Medication List      START taking these medications    Details   levETIRAcetam (KEPPRA) 1000 MG tablet Take 1 tablet (1,000 mg) by mouth 2 times daily for 2 days  Qty: 4 tablet, Refills: 0    Associated Diagnoses: Status post craniotomy      linezolid (ZYVOX) 600 MG tablet Take 1 tablet (600 mg) by mouth every 12 hours for 14 days  Qty: 28 tablet, Refills: 1    Associated Diagnoses: Postoperative wound infection      !! oxyCODONE (ROXICODONE) 5 MG tablet Take 1 tablet (5 mg) by mouth every 4 hours as needed (moderate pain (pain rating 4-6))  Qty: 20 tablet, Refills: 0    Associated Diagnoses: Status post craniotomy      polyethylene glycol (MIRALAX) 17 g packet Take 17 g by mouth daily for 14 days  Qty: 14 packet, Refills: 0    Associated Diagnoses: Postoperative wound infection      senna-docusate (SENOKOT-S/PERICOLACE) 8.6-50 MG tablet Take 1 tablet by mouth 2 times daily  Qty: 14 tablet, Refills: 0    Associated Diagnoses: Status post craniotomy       !! - Potential duplicate medications found. Please discuss with provider.      CONTINUE these medications which have NOT CHANGED    Details   acetaminophen (TYLENOL) 650 MG CR tablet Take 1,300 mg by mouth every 8 hours as needed for mild pain or fever 3 TIMES DAILY      eletriptan (RELPAX) 40 MG tablet Take 1 tablet by mouth. repeat after 2 hours if needed.  Qty: 24 tablet, Refills: 11    Associated Diagnoses: Chronic migraine without aura without status migrainosus, not  intractable      gabapentin (NEURONTIN) 600 MG tablet Take 1 tablet (600 mg) by mouth 3 times daily  Qty: 90 tablet, Refills: 0    Associated Diagnoses: Trigeminal neuralgia; Jaw pain      hydrochlorothiazide (HYDRODIURIL) 25 MG tablet Take 1 tablet (25 mg) by mouth daily  Qty: 30 tablet, Refills: 10    Associated Diagnoses: Essential hypertension with goal blood pressure less than 140/90      losartan (COZAAR) 100 MG tablet Take 1 tablet (100 mg) by mouth daily  Qty: 90 tablet, Refills: 3    Associated Diagnoses: Essential hypertension with goal blood pressure less than 140/90      OXcarbazepine (TRILEPTAL) 300 MG tablet Take 2 tabs in the morning and 1 tab at night.  Qty: 270 tablet, Refills: 0    Associated Diagnoses: Trigeminal neuralgia      !! oxyCODONE (ROXICODONE) 5 MG tablet Take 1 tablet (5 mg) by mouth 2 times daily as needed for pain  Qty: 8 tablet, Refills: 0    Associated Diagnoses: Trigeminal neuralgia      simvastatin (ZOCOR) 40 MG tablet Take 1 tablet (40 mg) by mouth At Bedtime  Qty: 90 tablet, Refills: 3    Associated Diagnoses: Hyperlipidemia LDL goal <160      tiZANidine (ZANAFLEX) 4 MG capsule Take 1 capsule (4 mg) by mouth 3 times daily  Qty: 90 capsule, Refills: 2    Associated Diagnoses: Trigeminal neuralgia       !! - Potential duplicate medications found. Please discuss with provider.         KATHERINE Blevins, CNP  Department of Neurosurgery  Pager: [phone]           Discharge Instructions and Follow-Up:     Discharge diet: Regular   Discharge activity: You may advance activity as tolerated. No strenuous exercise or heay lifting greater than 10 lbs for 4 weeks or until seen and cleared in clinic.   Discharge follow-up: Follow-up with Infectious Disease in 2 weeks    Follow-up Dr. Grey Simeon MD in 2 weeks, all additional follow-up visits to be determined by Dr. Grey Simeon MD       Wound care: Ok to shower,however no scrubbing of the wound and no soaking of the wound,  meaning no bathtubs or swimming pools. Pat dry only. Leave wound open to air.  Patient to have wound checked 2 weeks following surgery.    Wound location: right post auricular   Closure technique: nylon  Dressing needs: none  Post-op care at follow-up: Keep dry and clean     Please call if you have:  1. increased pain, redness, drainage, swelling at your incision  2. fevers > 101.5 F degrees  3. with any questions or concerns.  You may reach the Neurosurgery clinic at [phone] during regular work hours. ER at [phone].    and ask for the Neurosurgery Resident on call at [phone], during off hours or weekends.         Discharge Disposition:     Discharged to home        KATHERINE Blevins, CNP  Department of Neurosurgery  Pager: [phone]

## 2022-05-12 ENCOUNTER — PATIENT OUTREACH (OUTPATIENT)
Dept: CARE COORDINATION | Facility: CLINIC | Age: 52
End: 2022-05-12
Payer: COMMERCIAL

## 2022-05-12 DIAGNOSIS — Z71.89 OTHER SPECIFIED COUNSELING: ICD-10-CM

## 2022-05-12 NOTE — PROGRESS NOTES
"Clinic Care Coordination Contact  [facility]: Post-Discharge Note  SITUATION                                                      Admission:    Admission Date: 05/06/22   Reason for Admission: Postoperative wound infection  Discharge:   Discharge Date: 05/11/22  Discharge Diagnosis: Postoperative wound infection    BACKGROUND                                                      Per hospital discharge summary and inpatient provider notes:    Jossue \"Elmo\" Melissa is a 52 yo male with PMH of trigeminal neuralgia who is 1 month s/p right microvascular decompression on 04/07/2022. He reports that starting yesterday morning after showering he noticed drainage from the inferior part of his right retroauricular surgical incision. He denies any other fevers, chills, nausea, vomiting, constipation/diarrhea, chest pain, shortness of breath, headaches, confusion, photophobia worsening neck stiffness, LOC, weakness, or numbness/tingling/paresthesias. Up until yesterday, his recovery had been progressing appropriately, and he hasn't had any of his pre-operative trigeminal neuralgia symptoms since his procedure. Of note, the last time he ate was 10AM this morning. Regarding his trigeminal symptoms, he reports since surgery he has not had any of his usual right sided V3 electrical pain. He has been slowly weaning his [place] related medications.     ASSESSMENT      Enrollment  Primary Care Care Coordination Status: Declined    Discharge Assessment  How are you doing now that you are home?: \"I've had better days but you know we will make it.\"  How are your symptoms? (Red Flag symptoms escalate to triage hotline per guidelines): Improved  Do you feel your condition is stable enough to be safe at home until your provider visit?: Yes  Does the patient have their discharge instructions? : Yes  Does the patient have questions regarding their discharge instructions? : No  Were you started on any new medications or were there changes to any " of your previous medications? : Yes  Does the patient have all of their medications?: No (see comment) (Patient does not have Keppra medication. Patient spoke to an RN, Sara, today and mentioned that she is not concerned about patient not having this medication at this time.)  Do you have questions regarding any of your medications? : No  Do you have all of your needed medical supplies or equipment (DME)?  (i.e. oxygen tank, CPAP, cane, etc.): Yes  Discharge follow-up appointment scheduled within 14 calendar days? : Yes  Discharge Follow Up Appointment Date: 06/06/22  Discharge Follow Up Appointment Scheduled with?: Specialty Care Provider (Infectious Disease)      PLAN                                                      Outpatient Plan:       Follow-up with Infectious Disease in 2 weeks     Follow-up Dr. Grey Simeon MD in 2 weeks, all additional follow-up visits to be determined by Dr. Grey Simeon MD         Future Appointments   Date Time Provider Department Center   6/6/2022  4:30 PM Evette Reyes MD [facility]         For any urgent concerns, please contact our 24 hour nurse triage line: [phone] ([phone])         DIPTI Gardner  [phone]  [facility]

## 2022-05-12 NOTE — OP NOTE
Neurosurgery Operative Report      Procedure Date: 05/622     PREOPERATIVE DIAGNOSIS:  wound infection      POSTOPERATIVE DIAGNOSIS:  wound infection      PROCEDURES PERFORMED:   1.  Right-sided cranial wound washout     ATTENDING SURGEON:  Grey Simeon MD      RESIDENT SURGEON:  Maria Esther Katz MD      ANESTHESIA:  General.      ESTIMATED BLOOD LOSS:  10 mL.      INDICATIONS:  Mr. Torres is a 51 year old male who presented to clinic with V3 trigeminal neuralgia on the right side and underwent a right sided microvascular decompression last month. Post-operatively, he has no further episodes of trigeminal neuralgia. However, he unfortunately did begin to notice purulent drainage from incision site and so he presented for a wound washout. After risks, benefits and alternatives were discussed, patient elected to proceed with the above named surgery.      DESCRIPTION OF PROCEDURE:    After consent form was verified, the patient was brought to the operating room where after general anesthesia was induced, endotracheal tube was placed. A moore head buckley was applied with 60 lbs of pressure, and the patient was then placed in the lateral decubitus position with the right side up. His head was placed with slight flexion forward and laterally towards the floor. We secured this to the bed and we ensured that all pressure points were padded for the duration of the case. The patient was strapped to the bed with tape and safety straps to ensure safety when rotating the bed, which was tested prior to draping.     After time out, the previous inverse U incision along the right retromastoid region was reopened with a #10 blade and scissors. Previous vicryl stitches within the galea were cut and removed as well. We immediately noted purulent drainage from the incision site, both supra and subgaleal. We took cultures and sent this to microbiology lab. We then removed the titanium plate and screws overlying the craniectomy site and  elected to leave this off. Underneath the plate, we could see that the duramatrix synthetic graft had eroded and there was a small hole where it had been. We irrigated with copious amounts of fluid including subdurally through this dural defect. We did not note any CSF leakage throughout. We then used a 4-0 neurolon suture to close the dural defect.     We then began to close. We reapproximated the muscle with 0 vicryl sutures. The galea was closed with 2-0 vicryl suture in a simple interrupted inverted fashion. The skin was closed with a 3-0 nylon suture in a simple running fashion.  The incision was cleaned and dressed in the usual sterile fashion and then he was turned supine onto the hospital bed and head removed from the [place] headholder.     All counts were correct at the end case x 2.      Dr. Simeon was present for all critical portions of the operation and immediately available at all times.      Maria Esther Katz MD  Neurosurgery PGY7

## 2022-05-14 LAB
BACTERIA WND CULT: ABNORMAL
BACTERIA WND CULT: ABNORMAL

## 2022-05-18 ENCOUNTER — LAB (OUTPATIENT)
Dept: LAB | Facility: CLINIC | Age: 52
End: 2022-05-18
Payer: COMMERCIAL

## 2022-05-18 ENCOUNTER — VIRTUAL VISIT (OUTPATIENT)
Dept: INFECTIOUS DISEASES | Facility: CLINIC | Age: 52
End: 2022-05-18
Attending: INTERNAL MEDICINE
Payer: COMMERCIAL

## 2022-05-18 ENCOUNTER — TELEPHONE (OUTPATIENT)
Dept: NEUROSURGERY | Facility: CLINIC | Age: 52
End: 2022-05-18

## 2022-05-18 DIAGNOSIS — T81.49XA POSTOPERATIVE WOUND INFECTION: Primary | ICD-10-CM

## 2022-05-18 DIAGNOSIS — Z98.890 STATUS POST CRANIOTOMY: ICD-10-CM

## 2022-05-18 LAB
ANION GAP SERPL CALCULATED.3IONS-SCNC: 8 MMOL/L (ref 3–14)
BUN SERPL-MCNC: 13 MG/DL (ref 7–30)
CALCIUM SERPL-MCNC: 9.4 MG/DL (ref 8.5–10.1)
CHLORIDE BLD-SCNC: 108 MMOL/L (ref 94–109)
CO2 SERPL-SCNC: 25 MMOL/L (ref 20–32)
CREAT SERPL-MCNC: 1.31 MG/DL (ref 0.66–1.25)
CRP SERPL-MCNC: <2.9 MG/L (ref 0–8)
ERYTHROCYTE [DISTWIDTH] IN BLOOD BY AUTOMATED COUNT: 13 % (ref 10–15)
GFR SERPL CREATININE-BSD FRML MDRD: 65 ML/MIN/1.73M2
GLUCOSE BLD-MCNC: 102 MG/DL (ref 70–99)
HCT VFR BLD AUTO: 48.4 % (ref 40–53)
HGB BLD-MCNC: 15.9 G/DL (ref 13.3–17.7)
MCH RBC QN AUTO: 32.2 PG (ref 26.5–33)
MCHC RBC AUTO-ENTMCNC: 32.9 G/DL (ref 31.5–36.5)
MCV RBC AUTO: 98 FL (ref 78–100)
PLATELET # BLD AUTO: 351 10E3/UL (ref 150–450)
POTASSIUM BLD-SCNC: 3.9 MMOL/L (ref 3.4–5.3)
RBC # BLD AUTO: 4.94 10E6/UL (ref 4.4–5.9)
SODIUM SERPL-SCNC: 141 MMOL/L (ref 133–144)
WBC # BLD AUTO: 7.5 10E3/UL (ref 4–11)

## 2022-05-18 PROCEDURE — 99214 OFFICE O/P EST MOD 30 MIN: CPT | Mod: 95 | Performed by: INTERNAL MEDICINE

## 2022-05-18 PROCEDURE — 85027 COMPLETE CBC AUTOMATED: CPT

## 2022-05-18 PROCEDURE — 36415 COLL VENOUS BLD VENIPUNCTURE: CPT

## 2022-05-18 PROCEDURE — 86140 C-REACTIVE PROTEIN: CPT

## 2022-05-18 PROCEDURE — 80048 BASIC METABOLIC PNL TOTAL CA: CPT

## 2022-05-18 NOTE — PROGRESS NOTES
Elmo is a 51 year old who is being evaluated via a billable video visit.      How would you like to obtain your AVS? [email or web address]  If the video visit is dropped, the invitation should be resent by: Send to e-mail at: [email or web address]  Will anyone else be joining your video visit? No         Video Start Time: 4:06 PM     Video-Visit Details    Type of service:  Video Visit    Video End Time:4:19pm    Originating Location (pt. Location): Home    Distant Location (provider location):  [facility] [place]     Platform used for Video Visit: Gala     Jossue Torres is here today for a complaint of hospital discharge follow up.      Assessment & Plan/Recommendations     ID problem list:  1. Post-op wound infection s/p neurosurgery debridement and mesh removal 5/6/22  2. History of trigeminal neuralgia s/p microvascular decompression 4/7/22    Recs:  - recommended he extend his prior po linezolid 600 bid course (already has refills ordered) until next ID follow up appointment to reassess clinically prior to stopping  - recheck labs prior to next ID visit within 2 weeks  - requested patient upload a photo of his surgical site to [employer]  - follow with neurosurgery for surgical mangement  - follow up in ID clinic to reassess    Discussion:  Patient had been on IV vancomycin, cefepime, and metronidazole as inpatient initially since his 5/6 neurosurgical debridement/mesh removal, which was then converted to po linezolid at the time of discharge since only gram positives (C.acnes, Staph epidermidis, and GPB) grew in OR debridement cultures. He has been doing well and continues clinically improving on current antibiotic regimen without signs of cytopenias on most recent labs from this morning (Hgb, PLTs, and WBC all stable), though his BMP and CRP from this morning are still pending; no new drainage or erythema at surgical wound site (he says he will upload a photo of it to [employer]), and pain  "still present though improving per patient. Given that he is tolerating the linezolid well, and given the extent of the prior neurosurgical site infection, would recommend he continue on po linezolid until his next ID follow up appointment to reassess clinically -- will plan to re-check labs and have him follow up with me in ID clinic within 2 weeks to reassess. He appears to already have a refill scheduled for his linezolid, though will order the labs (to be drawn at [facility] near his house) prior to next visit.    I communicated with the patient at this visit.      Patient was seen on 05/18/22 via video visit.    30 minutes spent on the date of the encounter doing chart review, history and exam, documentation and further activities per the note      Cesar Guo MD  Infectious Diseases      Subjective       HPI:  (As per ID consult note from 5/8/22 by Tim Jeong and Cuauhtemoc):    \"Jossue Torres is a 51 year old male who with history of trigeminal neuralgia status post right microvascular decompression on 4/7.  While showering yesterday noted drainage from the inferior part of his surgical incision.  He had been improving until 1 day prior to presentation from his trigeminal neuralgia symptoms and had not had any other associated symptoms including fever, chills, nausea vomiting.  Images from neurosurgery notes show purulent drainage from the surgical incision site.  He was taken to the OR on 5/6 for washout. In discussion with neurosurgeon, all hardware and foreign material removed. Unfortunately, this was along the dura and there was concern going deeper would lead to CSF leak.       After the OR, he was started on vancomycin and cefepime.  He did receive perioperative cefazolin.  There was no white blood cell count elevation on admission and his CRP was mildly elevated to 11. Infectious disease was consulted for antibiotic management.      Today, he is remained afebrile with only " "postoperative pain.  He confirms the above history and reports feeling very well.  He wants to discharge soon as possible however he understands that he had a infection near his brain.  He denies fever, chills or other systemic symptoms.  No cough or abdominal pain.  No shortness of breath.\"      Interval HPI:  Since discharge from hospital 5/11 he says he continues doing well on linezolid without any new issues. No fevers/chills. Still with occasional shooting pain/pressure around incision site for which he takes tylenol/ibuprofen or oxycodone as needed. Overall he thinks the pain is improving compared to prior, using less pain medication. Occasional numbness around the incision site. No drainage -- he says his son takes a look at it daily and no redness, etc. No neck pain. No sore throat, no cough. No nausea/vomiting, no diarrhea, stopped taking the stool softener, loose BM but not watery diarrhea, no abdominal pain. Is on 7 more days of linezolid. He went to [facility] for his labs, and goes to  for his medications.       PAST MEDICAL HISTORY  Past Medical History:   Diagnosis Date     Herpes zoster without mention of complication 1991     Otherwise as per HPI    PAST SURGICAL HISTORY  Past Surgical History:   Procedure Laterality Date     CRANIOTOMY, DECOMPRESS NEUROVASCULAR, COMBINED Right 04/07/2022    Procedure: Right microvascular decompression  Latex Free;  Surgeon: Grey Simeon MD;  Location: [facility] OR     IRRIGATION AND DEBRIDEMENT CRANIUM Right 05/06/2022    Procedure: Retro-Mastoid wound washout;  Surgeon: Grey Simeon MD;  Location: [facility] OR     PICC INSERTION - DOUBLE LUMEN Right 05/10/2022    right basilic 5 fr dl picc 44 cm     SURGICAL HISTORY OF -   1970    right inguinal hernioplasty     SURGICAL HISTORY OF -   11/16/1993    lysis of two penile adhesions and cauterization of penile condyloma.     Otherwise as per HPI    ALLERGIES AND DRUG " REACTIONS  Allergies   Allergen Reactions     Nkda [No Known Drug Allergies]        FAMILY HISTORY  No known immunocompromising conditions or infections unless listed below  family history includes C.A.D. in his maternal aunt, maternal grandmother, and maternal uncle; Hypertension in his brother and father.    SOCIAL AND FUNCTIONAL HISTORY  Social History     Tobacco Use     Smoking status: Current Every Day Smoker     Packs/day: 0.50     Years: 15.00     Pack years: 7.50     Types: Cigarettes     Smokeless tobacco: Former User     Types: Chew   Vaping Use     Vaping Use: Never used   Substance Use Topics     Alcohol use: Yes     Comment: rare     Drug use: No     Otherwise as per HPI    CURRENT ANTIBIOTICS  Other medications reviewed in EPIC  PO linezolid 600 mg bid    REVIEW OF SYSTEMS  10 point ROS obtained, pertinent positives and negatives as above.    Objective   PHYSICAL EXAMINATION  Physical exam limited due to the nature of video visit  Constitutional:  appearance not in distress, sitting at home  Eyes: no conjunctival injection   ENT: no nasal discharge visible  Pulmonary: appears to have unlabored breathing  Skin: no rashes visible on video   Neurologic: awake, alert and interactive  Psychiatric: normal judgment/insight, normal memory, normal mood/affect      Other Significant Information (Labs, cultures, radiology, etc)    Micro reviewed:     5/6/22 OR cultures: +Staph epidermidis, +C.acnes, +Gram positive bacilli

## 2022-05-18 NOTE — NURSING NOTE
Patient notes shooting pain near incision site. Elmo is using tylenol and ibuprofen prn. He report a pain scale at about 4 in general out of 10 during intermittent shooting pain.     Pt states there are no changes to their allergy and medication list since last reviewed on e-check in. Patient notes that medication list and allergies are accurate since e-check in. He notes removal of some medications from list.    Laverne DOWNS, Virtual Visit Facilitator 3:49 PM May 18, 2022

## 2022-05-18 NOTE — TELEPHONE ENCOUNTER
Patient calling asking for post op appointment     Patient states wound is looking good, no drainage, some discomfort.  Patient is using Oxycodone 5mg one tablet 2-3 times per day alternating with Tylenol.      Explained I would get a post op scheduled and get back to patient.  I just need to work through.  Voices understanding

## 2022-05-18 NOTE — LETTER
5/18/2022       RE: Jossue Torres  [address] [facility] [address]     Dear Colleague,    Thank you for referring your patient, Jossue Torres, to the [facility] INFECTIOUS DISEASE CLINIC [place] at [facility]. Please see a copy of my visit note below.    Elmo is a 51 year old who is being evaluated via a billable video visit.      How would you like to obtain your AVS? [facility]  If the video visit is dropped, the invitation should be resent by: Send to e-mail at: [email or web address]  Will anyone else be joining your video visit? No         Video Start Time: 4:06 PM     Video-Visit Details    Type of service:  Video Visit    Video End Time:4:19pm    Originating Location (pt. Location): Home    Distant Location (provider location):  [facility] INFECTIOUS DISEASE CLINIC [place]     Platform used for Video Visit: AmKyrie     Jossue Torres is here today for a complaint of hospital discharge follow up.      Assessment & Plan/Recommendations     ID problem list:  1. Post-op wound infection s/p neurosurgery debridement and mesh removal 5/6/22  2. History of trigeminal neuralgia s/p microvascular decompression 4/7/22    Recs:  - recommended he extend his prior po linezolid 600 bid course (already has refills ordered) until next ID follow up appointment to reassess clinically prior to stopping  - recheck labs prior to next ID visit within 2 weeks  - requested patient upload a photo of his surgical site to [facility]  - follow with neurosurgery for surgical mangement  - follow up in ID clinic to reassess    Discussion:  Patient had been on IV vancomycin, cefepime, and metronidazole as inpatient initially since his 5/6 neurosurgical debridement/mesh removal, which was then converted to po linezolid at the time of discharge since only gram positives (C.acnes, Staph epidermidis, and GPB) grew in OR debridement cultures. He has been doing well and continues  "clinically improving on current antibiotic regimen without signs of cytopenias on most recent labs from this morning (Hgb, PLTs, and WBC all stable), though his BMP and CRP from this morning are still pending; no new drainage or erythema at surgical wound site (he says he will upload a photo of it to [employer]), and pain still present though improving per patient. Given that he is tolerating the linezolid well, and given the extent of the prior neurosurgical site infection, would recommend he continue on po linezolid until his next ID follow up appointment to reassess clinically -- will plan to re-check labs and have him follow up with me in ID clinic within 2 weeks to reassess. He appears to already have a refill scheduled for his linezolid, though will order the labs (to be drawn at [facility] near his house) prior to next visit.    I communicated with the patient at this visit.      Patient was seen on 05/18/22 via video visit.    30 minutes spent on the date of the encounter doing chart review, history and exam, documentation and further activities per the note      Cesar Guo MD  Infectious Diseases      Subjective       HPI:  (As per ID consult note from 5/8/22 by Tim Jeong and Cuauhtemoc):    \"Jossue Torres is a 51 year old male who with history of trigeminal neuralgia status post right microvascular decompression on 4/7.  While showering yesterday noted drainage from the inferior part of his surgical incision.  He had been improving until 1 day prior to presentation from his trigeminal neuralgia symptoms and had not had any other associated symptoms including fever, chills, nausea vomiting.  Images from neurosurgery notes show purulent drainage from the surgical incision site.  He was taken to the OR on 5/6 for washout. In discussion with neurosurgeon, all hardware and foreign material removed. Unfortunately, this was along the dura and there was concern going deeper would lead to CSF " "leak.       After the OR, he was started on vancomycin and cefepime.  He did receive perioperative cefazolin.  There was no white blood cell count elevation on admission and his CRP was mildly elevated to 11. Infectious disease was consulted for antibiotic management.      Today, he is remained afebrile with only postoperative pain.  He confirms the above history and reports feeling very well.  He wants to discharge soon as possible however he understands that he had a infection near his brain.  He denies fever, chills or other systemic symptoms.  No cough or abdominal pain.  No shortness of breath.\"      Interval HPI:  Since discharge from hospital 5/11 he says he continues doing well on linezolid without any new issues. No fevers/chills. Still with occasional shooting pain/pressure around incision site for which he takes tylenol/ibuprofen or oxycodone as needed. Overall he thinks the pain is improving compared to prior, using less pain medication. Occasional numbness around the incision site. No drainage -- he says his son takes a look at it daily and no redness, etc. No neck pain. No sore throat, no cough. No nausea/vomiting, no diarrhea, stopped taking the stool softener, loose BM but not watery diarrhea, no abdominal pain. Is on 7 more days of linezolid. He went to [facility] in [facility] for his labs, and goes to [facility] for his medications.       PAST MEDICAL HISTORY  Past Medical History:   Diagnosis Date     Herpes zoster without mention of complication 1991     Otherwise as per HPI    PAST SURGICAL HISTORY  Past Surgical History:   Procedure Laterality Date     CRANIOTOMY, DECOMPRESS NEUROVASCULAR, COMBINED Right 04/07/2022    Procedure: Right microvascular decompression  Latex Free;  Surgeon: Grey Simeon MD;  Location: [facility] OR     IRRIGATION AND DEBRIDEMENT CRANIUM Right 05/06/2022    Procedure: Retro-Mastoid wound washout;  Surgeon: Grey Simeon MD;  Location: [facility] OR "     PICC INSERTION - DOUBLE LUMEN Right 05/10/2022    right basilic 5 fr dl picc 44 cm     SURGICAL HISTORY OF -   1970    right inguinal hernioplasty     SURGICAL HISTORY OF -   11/16/1993    lysis of two penile adhesions and cauterization of penile condyloma.     Otherwise as per HPI    ALLERGIES AND DRUG REACTIONS  Allergies   Allergen Reactions     Nkda [No Known Drug Allergies]        FAMILY HISTORY  No known immunocompromising conditions or infections unless listed below  family history includes C.A.D. in his maternal aunt, maternal grandmother, and maternal uncle; Hypertension in his brother and father.    SOCIAL AND FUNCTIONAL HISTORY  Social History     Tobacco Use     Smoking status: Current Every Day Smoker     Packs/day: 0.50     Years: 15.00     Pack years: 7.50     Types: Cigarettes     Smokeless tobacco: Former User     Types: Chew   Vaping Use     Vaping Use: Never used   Substance Use Topics     Alcohol use: Yes     Comment: rare     Drug use: No     Otherwise as per HPI    CURRENT ANTIBIOTICS  Other medications reviewed in EPIC  PO linezolid 600 mg bid    REVIEW OF SYSTEMS  10 point ROS obtained, pertinent positives and negatives as above.    Objective   PHYSICAL EXAMINATION  Physical exam limited due to the nature of video visit  Constitutional:  appearance not in distress, sitting at home  Eyes: no conjunctival injection   ENT: no nasal discharge visible  Pulmonary: appears to have unlabored breathing  Skin: no rashes visible on video   Neurologic: awake, alert and interactive  Psychiatric: normal judgment/insight, normal memory, normal mood/affect      Other Significant Information (Labs, cultures, radiology, etc)    Micro reviewed:     5/6/22 OR cultures: +Staph epidermidis, +C.acnes, +Gram positive bacilli

## 2022-05-19 ENCOUNTER — MYC MEDICAL ADVICE (OUTPATIENT)
Dept: NEUROSURGERY | Facility: CLINIC | Age: 52
End: 2022-05-19
Payer: COMMERCIAL

## 2022-05-19 NOTE — TELEPHONE ENCOUNTER
Attn: Sara Baker, RNCC for Dr. Simeon   Patient imaging of incision in [place]     Esther Nobles LPN  Neurosurgery

## 2022-05-20 ENCOUNTER — TELEPHONE (OUTPATIENT)
Dept: NEUROSURGERY | Facility: CLINIC | Age: 52
End: 2022-05-20
Payer: COMMERCIAL

## 2022-05-20 NOTE — TELEPHONE ENCOUNTER
Attn: Sara Baker, RNCC for Dr. Simeon   Patient imaging of surgical site 05/20/2022    Esther Nobles LPN  Neurosurgery

## 2022-05-20 NOTE — TELEPHONE ENCOUNTER
Elmo calling checking on post op appointment for next week.  Patient states doing well at this time.  Will review with scheduling and get back to patient, voices understanding.

## 2022-05-24 ENCOUNTER — OFFICE VISIT (OUTPATIENT)
Dept: NEUROSURGERY | Facility: CLINIC | Age: 52
End: 2022-05-24
Payer: COMMERCIAL

## 2022-05-24 VITALS
OXYGEN SATURATION: 98 % | BODY MASS INDEX: 29.72 KG/M2 | SYSTOLIC BLOOD PRESSURE: 130 MMHG | DIASTOLIC BLOOD PRESSURE: 87 MMHG | HEART RATE: 84 BPM | RESPIRATION RATE: 16 BRPM | WEIGHT: 213 LBS

## 2022-05-24 DIAGNOSIS — G50.0 TRIGEMINAL NEURALGIA: ICD-10-CM

## 2022-05-24 DIAGNOSIS — T81.49XA POSTOPERATIVE WOUND INFECTION: ICD-10-CM

## 2022-05-24 PROCEDURE — 99024 POSTOP FOLLOW-UP VISIT: CPT | Performed by: NURSE PRACTITIONER

## 2022-05-24 RX ORDER — LINEZOLID 600 MG/1
600 TABLET, FILM COATED ORAL EVERY 12 HOURS
Qty: 28 TABLET | Refills: 0 | Status: SHIPPED | OUTPATIENT
Start: 2022-05-24 | End: 2022-10-11

## 2022-05-24 RX ORDER — OXYCODONE HYDROCHLORIDE 5 MG/1
5 TABLET ORAL DAILY PRN
Qty: 7 TABLET | Refills: 0 | Status: SHIPPED | OUTPATIENT
Start: 2022-05-24 | End: 2022-12-12

## 2022-05-24 ASSESSMENT — PAIN SCALES - GENERAL: PAINLEVEL: MODERATE PAIN (5)

## 2022-05-24 NOTE — PROGRESS NOTES
[facility]  Department of Neurosurgery      Name: Jossue Torres  MRN: [id number]  Age: 51 year old  : 1970  Referring provider: Grey Simeon  2022      Chief Complaint:   Right V3 Trigeminal Neuralgia   S/p Right MVD on 2022  Post operative wound infection, s/p Right-sided cranial wound washout on 2022  2 weeks post op visit    History of Present Illness:   Jossue Torres is a 51 year old male with a history of right Trigeminal Neuralgia, s/p right MVD on 2022. Patient was seen on 2022 for 2 weeks post op and sutures were removed at that time. Then on , patient contacted the clinic with concerns of surgical site infection with purulent appearing drainage. He was asked to come into the hospital and subsequently underwent Right-sided cranial wound washout on 2022. Gram stain from the OR revealed GPRs and GPCs, cultures positive for staph epi, cutibacterium acnes, and diphtheroids. ID was consulted and he was treated with antibiotics. Patient was discharged from the hospital on 2022.     Patient had a virtual visit with ID on  and was recommended to continue Zyvox for 2 more weeks. He has a follow-up appointment on 2022.     Today, patient presents to the evaluation alone. He denies any fever since hospital discharge. He reports incisional pain and has been using Oxycodone 5 mg twice daily prn, Tylenol and Ibuprofen prn. He reports the pain as a throbbing pain. Incision has been healing well. No redness or drainage from the incision. He also reported a few episode of tingling in right V3 distribution.     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        Review of Systems:   Pertinent items are noted in HPI or as in patient entered ROS below, remainder of complete ROS is negative.   No flowsheet data found.     Physical Exam:   There were no vitals taken for this visit.   General: No acute distress.     Neuro: The patient is fully oriented. Speech is normal. Extraocular movements are intact without nystagmus.  Psych: Normal mood and affect. Behavior is normal.    Incision: Right retromastoid incision CDI with sutures. No erythema or drainage noted    Imaging:  No new imaging    Procedures:  Sutures removed without any difficulties. Patient tolerated well.     Assessment:  Right V3 Trigeminal Neuralgia   S/p Right MVD on 4/7/2022  Post operative wound infection, s/p Right-sided cranial wound washout on 5/6/2022  2 weeks post op visit      Plan:  Sutures removed without any issues. Incision CDI. Patient is planning to return to work after 6 weeks from the surgery (After 6/20) which is reasonable. For incisional pain, I ordered oxycodone 5 mg daily prn. He can also use Tizanidine 4 mg three times a day as needed. Patient to follow-up with ID as scheduled.      I spent 25 minutes on patient care activities related to this encounter on the date of service, including time spent reviewing the chart, obtaining history and examination and in counseling the patient, and in documentation in the electronic medical record.      Alyssa MURPHY, CNP  Department of Neurosurgery

## 2022-05-24 NOTE — LETTER
2022       RE: Jossue Torres  [address] [facility] [address]     Dear Colleague,    Thank you for referring your patient, Jossue Torres, to the [facility] NEUROSURGERY CLINIC [place] at [facility]. Please see a copy of my visit note below.    [facility]  Department of Neurosurgery      Name: Jossue Torres  MRN: [id number]  Age: 51 year old  : 1970  Referring provider: Grey Simeon  2022      Chief Complaint:   Right V3 Trigeminal Neuralgia   S/p Right MVD on 2022  Post operative wound infection, s/p Right-sided cranial wound washout on 2022  2 weeks post op visit    History of Present Illness:   Jossue Torres is a 51 year old male with a history of right Trigeminal Neuralgia, s/p right MVD on 2022. Patient was seen on 2022 for 2 weeks post op and sutures were removed at that time. Then on , patient contacted the clinic with concerns of surgical site infection with purulent appearing drainage. He was asked to come into the hospital and subsequently underwent Right-sided cranial wound washout on 2022. Gram stain from the OR revealed GPRs and GPCs, cultures positive for staph epi, cutibacterium acnes, and diphtheroids. ID was consulted and he was treated with antibiotics. Patient was discharged from the hospital on 2022.   Patient had a virtual visit with ID on  and was recommended to continue Zyvox for 2 more weeks. He has a follow-up appointment on 2022.     Today, patient presents to the evaluation alone. He denies any fever since hospital discharge. He reports incisional pain and has been using Oxycodone 5 mg twice daily prn, Tylenol and Ibuprofen prn. He reports the pain as a throbbing pain. Incision has been healing well. No redness or drainage from the incision. He also reported a few episode of tingling in right V3 distribution.     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      Review of Systems:   Pertinent items are noted in HPI or as in patient entered ROS below, remainder of complete ROS is negative.   No flowsheet data found.     Physical Exam:   There were no vitals taken for this visit.   General: No acute distress.    Neuro: The patient is fully oriented. Speech is normal. Extraocular movements are intact without nystagmus.  Psych: Normal mood and affect. Behavior is normal.    Incision: Right retromastoid incision CDI with sutures. No erythema or drainage noted    Imaging:  No new imaging    Procedures:  Sutures removed without any difficulties. Patient tolerated well.     Assessment:  Right V3 Trigeminal Neuralgia   S/p Right MVD on 4/7/2022  Post operative wound infection, s/p Right-sided cranial wound washout on 5/6/2022  2 weeks post op visit    Plan:  Sutures removed without any issues. Incision CDI. Patient is planning to return to work after 6 weeks from the surgery (After 6/20) which is reasonable. For incisional pain, I ordered oxycodone 5 mg daily prn. He can also use Tizanidine 4 mg three times a day as needed. Patient to follow-up with ID as scheduled.      I spent 25 minutes on patient care activities related to this encounter on the date of service, including time spent reviewing the chart, obtaining history and examination and in counseling the patient, and in documentation in the electronic medical record.    Alyssa MURPHY, CNP  Department of Neurosurgery

## 2022-05-31 ENCOUNTER — LAB (OUTPATIENT)
Dept: LAB | Facility: CLINIC | Age: 52
End: 2022-05-31
Payer: COMMERCIAL

## 2022-05-31 DIAGNOSIS — T81.49XA POSTOPERATIVE WOUND INFECTION: ICD-10-CM

## 2022-05-31 LAB
ALBUMIN SERPL-MCNC: 3.9 G/DL (ref 3.4–5)
ALP SERPL-CCNC: 80 U/L (ref 40–150)
ALT SERPL W P-5'-P-CCNC: 51 U/L (ref 0–70)
ANION GAP SERPL CALCULATED.3IONS-SCNC: 5 MMOL/L (ref 3–14)
AST SERPL W P-5'-P-CCNC: 22 U/L (ref 0–45)
BASOPHILS # BLD AUTO: 0.1 10E3/UL (ref 0–0.2)
BASOPHILS NFR BLD AUTO: 1 %
BILIRUB SERPL-MCNC: 0.6 MG/DL (ref 0.2–1.3)
BUN SERPL-MCNC: 13 MG/DL (ref 7–30)
CALCIUM SERPL-MCNC: 8.8 MG/DL (ref 8.5–10.1)
CHLORIDE BLD-SCNC: 105 MMOL/L (ref 94–109)
CO2 SERPL-SCNC: 28 MMOL/L (ref 20–32)
CREAT SERPL-MCNC: 1.47 MG/DL (ref 0.66–1.25)
CRP SERPL-MCNC: <2.9 MG/L (ref 0–8)
EOSINOPHIL # BLD AUTO: 0.5 10E3/UL (ref 0–0.7)
EOSINOPHIL NFR BLD AUTO: 7 %
ERYTHROCYTE [DISTWIDTH] IN BLOOD BY AUTOMATED COUNT: 12.4 % (ref 10–15)
GFR SERPL CREATININE-BSD FRML MDRD: 57 ML/MIN/1.73M2
GLUCOSE BLD-MCNC: 119 MG/DL (ref 70–99)
HCT VFR BLD AUTO: 44.7 % (ref 40–53)
HGB BLD-MCNC: 15.2 G/DL (ref 13.3–17.7)
IMM GRANULOCYTES # BLD: 0 10E3/UL
IMM GRANULOCYTES NFR BLD: 0 %
LYMPHOCYTES # BLD AUTO: 2.2 10E3/UL (ref 0.8–5.3)
LYMPHOCYTES NFR BLD AUTO: 32 %
MCH RBC QN AUTO: 32.4 PG (ref 26.5–33)
MCHC RBC AUTO-ENTMCNC: 34 G/DL (ref 31.5–36.5)
MCV RBC AUTO: 95 FL (ref 78–100)
MONOCYTES # BLD AUTO: 0.4 10E3/UL (ref 0–1.3)
MONOCYTES NFR BLD AUTO: 5 %
NEUTROPHILS # BLD AUTO: 4 10E3/UL (ref 1.6–8.3)
NEUTROPHILS NFR BLD AUTO: 56 %
PLATELET # BLD AUTO: 157 10E3/UL (ref 150–450)
POTASSIUM BLD-SCNC: 3.7 MMOL/L (ref 3.4–5.3)
PROT SERPL-MCNC: 7.3 G/DL (ref 6.8–8.8)
RBC # BLD AUTO: 4.69 10E6/UL (ref 4.4–5.9)
SODIUM SERPL-SCNC: 138 MMOL/L (ref 133–144)
WBC # BLD AUTO: 7.1 10E3/UL (ref 4–11)

## 2022-05-31 PROCEDURE — 80053 COMPREHEN METABOLIC PANEL: CPT

## 2022-05-31 PROCEDURE — 85025 COMPLETE CBC W/AUTO DIFF WBC: CPT

## 2022-05-31 PROCEDURE — 86140 C-REACTIVE PROTEIN: CPT

## 2022-05-31 PROCEDURE — 36415 COLL VENOUS BLD VENIPUNCTURE: CPT

## 2022-06-01 ENCOUNTER — VIRTUAL VISIT (OUTPATIENT)
Dept: INFECTIOUS DISEASES | Facility: CLINIC | Age: 52
End: 2022-06-01
Attending: INTERNAL MEDICINE
Payer: COMMERCIAL

## 2022-06-01 DIAGNOSIS — T81.49XA POSTOPERATIVE WOUND INFECTION: Primary | ICD-10-CM

## 2022-06-01 PROCEDURE — 99213 OFFICE O/P EST LOW 20 MIN: CPT | Mod: 95 | Performed by: INTERNAL MEDICINE

## 2022-06-01 NOTE — PROGRESS NOTES
Elmo is a 51 year old who is being evaluated via a billable video visit.      Patient denies any changes since echeck-in regarding medication and allergies and states all information entered during echeck-in remains accurate.    How would you like to obtain your AVS? MyChart  If the video visit is dropped, the invitation should be resent by: Text to cell phone: [phone]  Will anyone else be joining your video visit? No      Video Start Time: 3:58pm     Video-Visit Details    Type of service:  Video Visit    Video End Time:4:07pm    Originating Location (pt. Location): Home    Distant Location (provider location):  [facility] INFECTIOUS DISEASE CLINIC [place]     Platform used for Video Visit: Anturis         Assessment & Plan/Recommendations     ID problem list:  1. Post-op wound infection s/p neurosurgery debridement and mesh removal 5/6/22  2. History of trigeminal neuralgia s/p microvascular decompression 4/7/22  3. H/o headaches    Recs:  - later this week completes 4-week course of antibiotics with po linezolid 600 bid from the time of 5/6/22 debridement for deep surgical site infection  - follow with neurosurgery for further mangement  - follows with a primary care provider  - follow up in ID clinic PRN    Discussion:  Patient had been on IV vancomycin, cefepime, and metronidazole as inpatient initially since his 5/6 neurosurgical debridement/mesh removal, which was then converted to po linezolid at the time of discharge since only gram positives (C.acnes, Staph epidermidis, and GPB) grew in OR debridement cultures and since linezolid attains good CNS penetration. He has been doing well and continues clinically improving on current antibiotic regimen in terms of reportedly improving surgical site, down-trending CRP (now <2.9 on most recent lab checks x2), and without signs of cytopenias on most recent labs from yesterday (Hgb, PLTs, and WBC all stable) aside from slight increase in creatinine  "which may also be explained by the high amounts of ibuprofen he's been on concurrently; no new drainage or erythema at surgical wound site (he uploaded a photo of it to [employer]), and pain still present though similar to his migraines per patient for which he's on a triptan. Given that he is tolerating the linezolid well, and given the extent of the prior neurosurgical site infection, suggested that he complete a 4-week course (from the time of the 5/6/22 debridement) of antibiotics with po linezolid for deep surgical site infection s/p debridement (though no osteo involvement noted) with cultures positive for gram-positives (C.acnes, Staph epi, and unidentified gram positive). Would ideally avoid longer courses of linezolid given the increasing risk of side effects such as cytopenias if on longer courses. He agrees to continue to follow with his neurosurgeon for remaining monitoring and management. Can come back to ID clinic if recurrence of infectious signs/symptoms.      I communicated with the patient at this visit.      Patient was seen on 6/1/22 via video visit.    20 minutes spent on the date of the encounter doing chart review, history and exam, documentation and further activities per the note      Cesar Guo MD  Infectious Diseases      Subjective       HPI:  (As per ID consult note from 5/8/22 by Tim Jeong and Cuauhtemoc):    \"Jossue Torres is a 51 year old male who with history of trigeminal neuralgia status post right microvascular decompression on 4/7.  While showering yesterday noted drainage from the inferior part of his surgical incision.  He had been improving until 1 day prior to presentation from his trigeminal neuralgia symptoms and had not had any other associated symptoms including fever, chills, nausea vomiting.  Images from neurosurgery notes show purulent drainage from the surgical incision site.  He was taken to the OR on 5/6 for washout. In discussion with neurosurgeon, all hardware and " "foreign material removed. Unfortunately, this was along the dura and there was concern going deeper would lead to CSF leak.       After the OR, he was started on vancomycin and cefepime.  He did receive perioperative cefazolin.  There was no white blood cell count elevation on admission and his CRP was mildly elevated to 11. Infectious disease was consulted for antibiotic management.      Today, he is remained afebrile with only postoperative pain.  He confirms the above history and reports feeling very well.  He wants to discharge soon as possible however he understands that he had a infection near his brain.  He denies fever, chills or other systemic symptoms.  No cough or abdominal pain.  No shortness of breath.\"      Interval HPI:  6/1/22 update: Since last visit patient still taking the linezolid without any side effects, still on it twice a day. He still has about <1 week of pills left. Headaches still present daily for which he's being treated for migraines -- his migraine meds (eletriptan) takes away the headache, also on Tylenol and ibuprofen -- starts at base of skull/in back and spreads similar to usual migraines. He says the incision site is stable without erythema or drainage or swelling; occasional shooting pain. Had sutures removed by neurosurgeon on 5/24 without issues. No drainage from the site. Son is inspecting incision site daily and hasn't noticed any changes. He's following with neurosurgeon regarding his headaches.  No further trigeminal neuralgia shooting face pain aside from occasional tingle in mouth.  No other new symptoms, no fevers, no new GI symptoms (says has loose stool but no diarrhea).       PAST MEDICAL HISTORY  Past Medical History:   Diagnosis Date     Herpes zoster without mention of complication 1991     Otherwise as per HPI    PAST SURGICAL HISTORY  Past Surgical History:   Procedure Laterality Date     CRANIOTOMY, DECOMPRESS NEUROVASCULAR, COMBINED Right 04/07/2022    " Procedure: Right microvascular decompression  Latex Free;  Surgeon: Grey Simeon MD;  Location: [facility] OR     IRRIGATION AND DEBRIDEMENT CRANIUM Right 05/06/2022    Procedure: Retro-Mastoid wound washout;  Surgeon: Grey Simeon MD;  Location: [facility] OR     PICC INSERTION - DOUBLE LUMEN Right 05/10/2022    right basilic 5 fr dl picc 44 cm     SURGICAL HISTORY OF -   1970    right inguinal hernioplasty     SURGICAL HISTORY OF -   11/16/1993    lysis of two penile adhesions and cauterization of penile condyloma.     Otherwise as per HPI    ALLERGIES AND DRUG REACTIONS  Allergies   Allergen Reactions     Nkda [No Known Drug Allergies]        FAMILY HISTORY  No known immunocompromising conditions or infections unless listed below  family history includes C.A.D. in his maternal aunt, maternal grandmother, and maternal uncle; Hypertension in his brother and father.    SOCIAL AND FUNCTIONAL HISTORY  Social History     Tobacco Use     Smoking status: Current Every Day Smoker     Packs/day: 0.50     Years: 15.00     Pack years: 7.50     Types: Cigarettes     Smokeless tobacco: Former User     Types: Chew   Vaping Use     Vaping Use: Never used   Substance Use Topics     Alcohol use: Yes     Comment: rare     Drug use: No     Otherwise as per HPI    CURRENT ANTIBIOTICS  Other medications reviewed in EPIC  PO linezolid 600 mg bid    REVIEW OF SYSTEMS  10 point ROS obtained, pertinent positives and negatives as above.    Objective   PHYSICAL EXAMINATION  Physical exam limited due to the nature of video visit  Constitutional:  appearance not in distress, sitting at home  Eyes: no conjunctival injection   ENT: no nasal discharge visible  Pulmonary: appears to have unlabored breathing  Skin: no rashes visible on video; photos reviewed and no   Neurologic: awake, alert and interactive  Psychiatric: normal judgment/insight, normal memory, normal mood/affect      Other Significant Information (Labs, cultures,  radiology, etc)    Micro reviewed:     5/6/22 OR cultures: +Staph epidermidis, +C.acnes, +Gram positive bacilli

## 2022-06-01 NOTE — LETTER
6/1/2022       RE: Jossue Torres  [address] [id number]     Dear Colleague,    Thank you for referring your patient, Jossue Torres, to the [facility] INFECTIOUS DISEASE CLINIC [place] at [facility]. Please see a copy of my visit note below.    Elmo is a 51 year old who is being evaluated via a billable video visit.      Patient denies any changes since echeck-in regarding medication and allergies and states all information entered during echeck-in remains accurate.    How would you like to obtain your AVS? MyChart  If the video visit is dropped, the invitation should be resent by: Text to cell phone: [phone]  Will anyone else be joining your video visit? No      Video Start Time: 3:58pm     Video-Visit Details    Type of service:  Video Visit    Video End Time:4:07pm    Originating Location (pt. Location): Home    Distant Location (provider location):  [facility] INFECTIOUS DISEASE CLINIC [place]     Platform used for Video Visit: SW[employer]         Assessment & Plan/Recommendations     ID problem list:  1. Post-op wound infection s/p neurosurgery debridement and mesh removal 5/6/22  2. History of trigeminal neuralgia s/p microvascular decompression 4/7/22  3. H/o headaches    Recs:  - later this week completes 4-week course of antibiotics with po linezolid 600 bid from the time of 5/6/22 debridement for deep surgical site infection  - follow with neurosurgery for further mangement  - follows with a primary care provider  - follow up in ID clinic PRN    Discussion:  Patient had been on IV vancomycin, cefepime, and metronidazole as inpatient initially since his 5/6 neurosurgical debridement/mesh removal, which was then converted to po linezolid at the time of discharge since only gram positives (C.acnes, Staph epidermidis, and GPB) grew in OR debridement cultures and since linezolid attains good CNS penetration. He has been  "doing well and continues clinically improving on current antibiotic regimen in terms of reportedly improving surgical site, down-trending CRP (now <2.9 on most recent lab checks x2), and without signs of cytopenias on most recent labs from yesterday (Hgb, PLTs, and WBC all stable) aside from slight increase in creatinine which may also be explained by the high amounts of ibuprofen he's been on concurrently; no new drainage or erythema at surgical wound site (he uploaded a photo of it to [facility]), and pain still present though similar to his migraines per patient for which he's on a triptan. Given that he is tolerating the linezolid well, and given the extent of the prior neurosurgical site infection, suggested that he complete a 4-week course (from the time of the 5/6/22 debridement) of antibiotics with po linezolid for deep surgical site infection s/p debridement (though no osteo involvement noted) with cultures positive for gram-positives (C.acnes, Staph epi, and unidentified gram positive). Would ideally avoid longer courses of linezolid given the increasing risk of side effects such as cytopenias if on longer courses. He agrees to continue to follow with his neurosurgeon for remaining monitoring and management. Can come back to ID clinic if recurrence of infectious signs/symptoms.      I communicated with the patient at this visit.      Patient was seen on 6/1/22 via video visit.    20 minutes spent on the date of the encounter doing chart review, history and exam, documentation and further activities per the note      Cesar Guo MD  Infectious Diseases      Subjective       HPI:  (As per ID consult note from 5/8/22 by Tim Jeong and Cuauhtemoc):    \"Jossue Torres is a 51 year old male who with history of trigeminal neuralgia status post right microvascular decompression on 4/7.  While showering yesterday noted drainage from the inferior part of his surgical incision.  He had been improving until 1 day prior " "to presentation from his trigeminal neuralgia symptoms and had not had any other associated symptoms including fever, chills, nausea vomiting.  Images from neurosurgery notes show purulent drainage from the surgical incision site.  He was taken to the OR on 5/6 for washout. In discussion with neurosurgeon, all hardware and foreign material removed. Unfortunately, this was along the dura and there was concern going deeper would lead to CSF leak.       After the OR, he was started on vancomycin and cefepime.  He did receive perioperative cefazolin.  There was no white blood cell count elevation on admission and his CRP was mildly elevated to 11. Infectious disease was consulted for antibiotic management.      Today, he is remained afebrile with only postoperative pain.  He confirms the above history and reports feeling very well.  He wants to discharge soon as possible however he understands that he had a infection near his brain.  He denies fever, chills or other systemic symptoms.  No cough or abdominal pain.  No shortness of breath.\"      Interval HPI:  6/1/22 update: Since last visit patient still taking the linezolid without any side effects, still on it twice a day. He still has about <1 week of pills left. Headaches still present daily for which he's being treated for migraines -- his migraine meds (eletriptan) takes away the headache, also on Tylenol and ibuprofen -- starts at base of skull/in back and spreads similar to usual migraines. He says the incision site is stable without erythema or drainage or swelling; occasional shooting pain. Had sutures removed by neurosurgeon on 5/24 without issues. No drainage from the site. Son is inspecting incision site daily and hasn't noticed any changes. He's following with neurosurgeon regarding his headaches.  No further trigeminal neuralgia shooting face pain aside from occasional tingle in mouth.  No other new symptoms, no fevers, no new GI symptoms (says has loose " stool but no diarrhea).       PAST MEDICAL HISTORY  Past Medical History:   Diagnosis Date     Herpes zoster without mention of complication 1991     Otherwise as per HPI    PAST SURGICAL HISTORY  Past Surgical History:   Procedure Laterality Date     CRANIOTOMY, DECOMPRESS NEUROVASCULAR, COMBINED Right 04/07/2022    Procedure: Right microvascular decompression  Latex Free;  Surgeon: Grey Simeon MD;  Location: [facility] OR     IRRIGATION AND DEBRIDEMENT CRANIUM Right 05/06/2022    Procedure: Retro-Mastoid wound washout;  Surgeon: Grey Simeon MD;  Location: [facility] OR     PICC INSERTION - DOUBLE LUMEN Right 05/10/2022    right basilic 5 fr dl picc 44 cm     SURGICAL HISTORY OF -   1970    right inguinal hernioplasty     SURGICAL HISTORY OF -   11/16/1993    lysis of two penile adhesions and cauterization of penile condyloma.     Otherwise as per HPI    ALLERGIES AND DRUG REACTIONS  Allergies   Allergen Reactions     Nkda [No Known Drug Allergies]        FAMILY HISTORY  No known immunocompromising conditions or infections unless listed below  family history includes C.A.D. in his maternal aunt, maternal grandmother, and maternal uncle; Hypertension in his brother and father.    SOCIAL AND FUNCTIONAL HISTORY  Social History     Tobacco Use     Smoking status: Current Every Day Smoker     Packs/day: 0.50     Years: 15.00     Pack years: 7.50     Types: Cigarettes     Smokeless tobacco: Former User     Types: Chew   Vaping Use     Vaping Use: Never used   Substance Use Topics     Alcohol use: Yes     Comment: rare     Drug use: No     Otherwise as per HPI    CURRENT ANTIBIOTICS  Other medications reviewed in EPIC  PO linezolid 600 mg bid    REVIEW OF SYSTEMS  10 point ROS obtained, pertinent positives and negatives as above.    Objective   PHYSICAL EXAMINATION  Physical exam limited due to the nature of video visit  Constitutional:  appearance not in distress, sitting at home  Eyes: no conjunctival  injection   ENT: no nasal discharge visible  Pulmonary: appears to have unlabored breathing  Skin: no rashes visible on video; photos reviewed and no   Neurologic: awake, alert and interactive  Psychiatric: normal judgment/insight, normal memory, normal mood/affect      Other Significant Information (Labs, cultures, radiology, etc)    Micro reviewed:     5/6/22 OR cultures: +Staph epidermidis, +C.acnes, +Gram positive bacilli

## 2022-06-06 ENCOUNTER — MYC MEDICAL ADVICE (OUTPATIENT)
Dept: NEUROSURGERY | Facility: CLINIC | Age: 52
End: 2022-06-06
Payer: COMMERCIAL

## 2022-06-06 LAB
BACTERIA WND CULT: NO GROWTH
BACTERIA WND CULT: NO GROWTH

## 2022-06-06 NOTE — TELEPHONE ENCOUNTER
Attn: Sara Baker, RNCC for Dr. Simeon  Patient sent image of incision via Nazario Nobles LPN  Neurosurgery

## 2022-06-09 NOTE — TELEPHONE ENCOUNTER
Spoke with patient  Tylenol 1300mg every 8 hours for discomfort,  States takes one Oxycodone 5mg every 2-3 days.  Tizanidine 4mg 3x per day  Ibuprofen not a help and has not taken  States has had migraines every few days and using Relpax.    Patient will send updated picture of incision today and I will look into Neurology Headache clinic for migraines.

## 2022-06-20 ENCOUNTER — MYC MEDICAL ADVICE (OUTPATIENT)
Dept: NEUROSURGERY | Facility: CLINIC | Age: 52
End: 2022-06-20
Payer: COMMERCIAL

## 2022-06-20 NOTE — TELEPHONE ENCOUNTER
Attn: Sara Baker, RNCC for DR. Blanco Jc imaging attached in [facility]     Esther barkely LPN  Neurosurgery

## 2022-06-21 NOTE — TELEPHONE ENCOUNTER
Elmo calling with several items.  1.  Elmo continues to have some sharp pains 3-4x per day going up the side of his head to the top of his head.  Patient states then may have a dull ache for awhile.    2.  Patient has appointment with [facility]ogy Headache specialist on 6/24 for chronic migraines, to see if a different medication would help head discomfort.  3. Return to Work now scheduled for 7/11/22.  Patient will need several more weeks for head pain to resolve prior to going back to work.    Elmo will call  to see if any future paperwork is needed .  RTW paperwork received for Elmo and will be completed.      Elmo will callback if more paperwork needed, voices understanding.

## 2022-06-23 NOTE — TELEPHONE ENCOUNTER
FUTURE VISIT INFORMATION      FUTURE VISIT INFORMATION:    Date: 6/24/2022    Time: 1130am    Location: [facility]  REFERRAL INFORMATION:    Referring provider:  Self     Referring providers clinic:      Reason for visit/diagnosis  Headaches     RECORDS REQUESTED FROM:       Clinic name Comments Records Status Imaging Status   Internal Dr. Cooper-2/3/2022    MR Skull Base-2/2/2022    MR/MRA Brain-10/15/2021 Epic PACS

## 2022-06-24 ENCOUNTER — TELEPHONE (OUTPATIENT)
Dept: NEUROLOGY | Facility: CLINIC | Age: 52
End: 2022-06-24

## 2022-06-24 ENCOUNTER — VIRTUAL VISIT (OUTPATIENT)
Dept: NEUROLOGY | Facility: CLINIC | Age: 52
End: 2022-06-24
Payer: COMMERCIAL

## 2022-06-24 ENCOUNTER — PRE VISIT (OUTPATIENT)
Dept: NEUROLOGY | Facility: CLINIC | Age: 52
End: 2022-06-24

## 2022-06-24 DIAGNOSIS — G43.719 INTRACTABLE CHRONIC MIGRAINE WITHOUT AURA AND WITHOUT STATUS MIGRAINOSUS: Primary | ICD-10-CM

## 2022-06-24 PROCEDURE — 99214 OFFICE O/P EST MOD 30 MIN: CPT | Mod: 95 | Performed by: NURSE PRACTITIONER

## 2022-06-24 ASSESSMENT — MIGRAINE DISABILITY ASSESSMENT (MIDAS)
HOW MANY DAYS IN THE PAST 3 MONTHS HAVE YOU HAD A HEADACHE: 20
HOW MANY DAYS WAS YOUR PRODUCTIVITY CUT IN HALF BECAUSE OF HEADACHES: 0
HOW MANY DAYS WAS HOUSEWORK PRODUCTIVITY CUT IN HALF DUE TO HEADACHES: 0
TOTAL SCORE: 0
HOW MANY DAYS DID YOU NOT DO HOUSEWORK BECAUSE OF HEADACHES: 0
HOW MANY DAYS DID YOU MISS WORK OR SCHOOL BECAUSE OF HEADACHES: 0
ON A SCALE FROM 0-10 ON AVERAGE HOW PAINFUL WERE HEADACHES: 9
HOW OFTEN WERE SOCIAL ACTIVITIES MISSED DUE TO HEADACHES: 0

## 2022-06-24 ASSESSMENT — HEADACHE IMPACT TEST (HIT 6)
HOW OFTEN DID HEADACHS LIMIT CONCENTRATION ON WORK OR DAILY ACTIVITY: SOMETIMES
HOW OFTEN DO HEADACHES LIMIT YOUR DAILY ACTIVITIES: SOMETIMES
HOW OFTEN HAVE YOU FELT TOO TIRED TO WORK BECAUSE OF YOUR HEADACHES: SOMETIMES
WHEN YOU HAVE A HEADACHE HOW OFTEN DO YOU WISH YOU COULD LIE DOWN: SOMETIMES
WHEN YOU HAVE HEADACHES HOW OFTEN IS THE PAIN SEVERE: VERY OFTEN
HOW OFTEN HAVE YOU FELT FED UP OR IRRITATED BECAUSE OF YOUR HEADACHES: SOMETIMES
HIT6 TOTAL SCORE: 61

## 2022-06-24 NOTE — PATIENT INSTRUCTIONS
Headache prevention -Emgality PA   Recommended a trial of migraine preventive treatment with Emgality. Side effects-allergic reaction or pain in the injection side or redness in the injection side. Unknown side effects with a long term use.   Rescue treatment -eletriptan as needed   Follow up in 3 months or sooner if needed

## 2022-06-24 NOTE — TELEPHONE ENCOUNTER
Prior Authorization Retail Medication Request    Medication/Dose: galcanezumab-gnlm (EMGALITY) 120 MG/ML injection; Inject 240 mg subcutaneous first month and then inject 120 mg subcutaneous every 28 days  ICD code (if different than what is on RX):      Previously Tried and Failed:  Gabepentin -dizziness/lighheadiness  Oxcarbazepine   Losartan+diuretic   Rationale:  Chronic migraine    Insurance Name:  [facility]  Insurance ID:  [id number]       Pharmacy Information (if different than what is on RX)  Name:    Phone:

## 2022-06-24 NOTE — PROGRESS NOTES
Elmo is a 51 year old who is being evaluated via a billable video visit.      How would you like to obtain your AVS? MyChart  If the video visit is dropped, the invitation should be resent by: Send to e-mail at: [email or web address]  Will anyone else be joining your video visit? No        Video-Visit Details    Video Start Time: 11:48 AM    Type of service:  Video Visit    Video End Time:12:15 PM    Originating Location (pt. Location): Home    Distant Location (provider location):  [facility] NEUROLOGY [facility]     Platform used for Video Visit: Ekot[employer]     ASSESSMENT AND PLAN:  Chronic migraine   Discussed Tx options   Cons and pros reviewed  Headache prevention -Emgality PA   Recommended a trial of migraine preventive treatment with Emgality. Side effects-allergic reaction or pain in the injection side or redness in the injection side. Unknown side effects with a long term use.   Rescue treatment -eletriptan as needed   Follow up in 3 months or sooner if needed     Subjective   Elmo is a 51 year old presenting for the following health issues:  Video Visit and Headache (New video visit for headaches. )  Post-op wound infection s/p neurosurgery debridement and mesh removal 5/6/22  2. History of trigeminal neuralgia s/p microvascular decompression 4/7/22  3. H/o headaches     HPI   Hx of migraine headaches since 15 years   All start -base of the skull on the right and starts throbbing and continuous to get worse and than worsen if not treated.   Eletriptan works very well for the most part and no side effects and looking into something preventative. Headache is gone within an hour  Associated -light bothers a little bit -being with headaches for so long, nausea/vomiting   Fr-15/month and more than 4 hours without Tx.     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   RESP: No audible wheeze, cough, or visible cyanosis.  No visible retractions or increased work of breathing.    SKIN: Visible skin clear.   NEURO: Face is symmetrical and symmetrical facial expressions, no weakness  Mentation and speech appropriate for age.  PSYCH: Mentation appears normal, affect normal/bright, judgement and insight intact, normal speech and appearance well-groomed.    I discussed all my recommendations with Jossue Torres who verbalizes understanding and comfortable with the plan.  All of patient's questions were answered from the best of my knowledge.  Patient is in agreement with the plan.     32 minutes spent on the date of the encounter doing video access, chart  Review,  meds review, treatment plan, documentation and further activities as noted above    KATHERINE Castellanos, CNP [facility]  Headache certified  [facility]    "

## 2022-06-24 NOTE — LETTER
"6/24/2022       RE: Jossue Torres  [address] [address]     Dear Colleague,    Thank you for referring your patient, Jossue Torres, to the [facility] NEUROLOGY CLINIC [place] at [facility]. Please see a copy of my visit note below.    ASSESSMENT AND PLAN:    Chronic migraine   Discussed Tx options   Cons and pros reviewed  Headache prevention -Emgality PA   Recommended a trial of migraine preventive treatment with Emgality. Side effects-allergic reaction or pain in the injection side or redness in the injection side. Unknown side effects with a long term use.   Rescue treatment -eletriptan as needed   Follow up in 3 months or sooner if needed     Subjective   Elmo is a 51 year old presenting for the following health issues:  Video Visit and Headache (New video visit for headaches. )  Post-op wound infection s/p neurosurgery debridement and mesh removal 5/6/22  2. History of trigeminal neuralgia s/p microvascular decompression 4/7/22  3. H/o headaches     HPI   Hx of migraine headaches since 15 years   All start -base of the skull on the right and starts throbbing and continuous to get worse and than worsen if not treated.   Eletriptan works very well for the most part and no side effects and looking into something preventative. Headache is gone within an hour  Associated -light bothers a little bit -being with headaches for so long, nausea/vomiting   Fr-15/month and more than 4 hours without Tx.     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   RESP: No audible wheeze, cough, or visible cyanosis.  No visible retractions or increased work of breathing.    SKIN: Visible skin clear.   NEURO: Face is symmetrical and symmetrical facial expressions, no weakness  Mentation and speech appropriate for age.  PSYCH: Mentation appears normal, affect normal/bright, judgement and insight intact, normal speech and appearance well-groomed.    I discussed all my recommendations with Jossue Torres who verbalizes understanding and comfortable with the plan.  All of patient's questions were answered from the best of my knowledge.  Patient is in agreement with the plan.     32 minutes spent on the date of the encounter doing video access, chart  Review,  meds review, treatment plan, documentation and further activities as noted above      KATHERINE Castellanos, CNP [facility]  Headache certified  [facility]

## 2022-06-24 NOTE — NURSING NOTE
Pt just had surgery for trigeminal neuralgia so he couldn't answer MIDAS scores very well. Doesn't work because of the surgery and is on a good pain med right now so household work doesn't bother him.

## 2022-06-27 NOTE — TELEPHONE ENCOUNTER
Central Prior Authorization Team   Phone: [phone]      PA Initiation    Medication: galcanezumab-gnlm (EMGALITY) 120 MG/ML injection  Insurance Company: [employer] - Phone [phone] Fax [phone]  Pharmacy Filling the Rx: [facility] - [place], [place] - [address] [address]  Filling Pharmacy Phone: [phone]  Filling Pharmacy Fax:    Start Date: 6/27/2022

## 2022-06-28 NOTE — TELEPHONE ENCOUNTER
Prior Authorization Approval    Authorization Effective Date: 6/24/2022  Authorization Expiration Date: 9/24/2022  Medication: galcanezumab-gnlm (EMGALITY) 120 MG/ML injection-APPROVED  Approved Dose/Quantity:   Reference #:     Insurance Company: [employer] - Phone [phone] Fax [phone]  Expected CoPay:       CoPay Card Available:      Foundation Assistance Needed:    Which Pharmacy is filling the prescription (Not needed for infusion/clinic administered): [facility] - [place], [place] - [address] [address]  Pharmacy Notified: Yes-Pharmacy will notify patient when ready.  Patient Notified: No

## 2022-06-29 ENCOUNTER — TELEPHONE (OUTPATIENT)
Dept: NEUROSURGERY | Facility: CLINIC | Age: 52
End: 2022-06-29

## 2022-06-29 NOTE — TELEPHONE ENCOUNTER
Patient calling stating he is getting better daily.  FMLA ends at the end of today.  Patient requests and approved to RTW on 6/30/22.  RTW Letter completed.  .    RTW Letter in My Chart for patient.    Callback for any questions/concerns      Patient has seen STEPHAN Hernandez in headache clinic and has a plan for migraine control.

## 2022-06-29 NOTE — LETTER
Patient:  Jossue Torres  :   1970  MRN:     [id number]      2022    Patient Name:  Jossue Torres    Physician: SMILEY ONOFRE MD    GABBY may return to work on 22    His  next clinic appointment is scheduled for .     Patient Limitations:    None and May advance to full time as tolerated.    Sincerely,    Smiley Onofre MD  Neurosurgery   [facility]      [phone]  1970

## 2022-07-05 DIAGNOSIS — G50.0 TRIGEMINAL NEURALGIA: ICD-10-CM

## 2022-07-06 RX ORDER — TIZANIDINE HYDROCHLORIDE 4 MG/1
4 CAPSULE, GELATIN COATED ORAL 3 TIMES DAILY
Qty: 90 CAPSULE | Refills: 2 | Status: SHIPPED | OUTPATIENT
Start: 2022-07-06 | End: 2022-10-31

## 2022-07-06 NOTE — TELEPHONE ENCOUNTER
Pending Prescriptions:                       Disp   Refills    tiZANidine (ZANAFLEX) 4 MG capsule        90 cap*2            Sig: Take 1 capsule (4 mg) by mouth 3 times daily    Routing refill request to provider for review/approval because:  Drug not on the [facility] refill protocol       Robbie Desai RN

## 2022-07-18 ENCOUNTER — MYC MEDICAL ADVICE (OUTPATIENT)
Dept: FAMILY MEDICINE | Facility: CLINIC | Age: 52
End: 2022-07-18

## 2022-07-18 DIAGNOSIS — I10 ESSENTIAL HYPERTENSION WITH GOAL BLOOD PRESSURE LESS THAN 140/90: ICD-10-CM

## 2022-07-18 RX ORDER — LOSARTAN POTASSIUM 100 MG/1
100 TABLET ORAL DAILY
Qty: 90 TABLET | Refills: 1 | Status: SHIPPED | OUTPATIENT
Start: 2022-07-18 | End: 2022-12-12

## 2022-07-18 NOTE — TELEPHONE ENCOUNTER
"Requested Prescriptions   Pending Prescriptions Disp Refills    losartan (COZAAR) 100 MG tablet 90 tablet 1     Sig: Take 1 tablet (100 mg) by mouth daily        Angiotensin-II Receptors Failed - 7/18/2022 11:50 AM        Failed - Normal serum creatinine on file in past 12 months     Recent Labs   Lab Test 05/31/22  1343   CR 1.47*       Ok to refill medication if creatinine is low          Passed - Last blood pressure under 140/90 in past 12 months       BP Readings from Last 3 Encounters:   05/24/22 130/87   05/11/22 (!) 143/83   04/22/22 (!) 152/112                 Passed - Recent (12 mo) or future (30 days) visit within the authorizing provider's specialty     Patient has had an office visit with the authorizing provider or a provider within the authorizing providers department within the previous 12 mos or has a future within next 30 days. See \"Patient Info\" tab in inbasket, or \"Choose Columns\" in Meds & Orders section of the refill encounter.              Passed - Medication is active on med list        Passed - Patient is age 18 or older        Passed - Normal serum potassium on file in past 12 months       Recent Labs   Lab Test 05/31/22  1343   POTASSIUM 3.7                          "

## 2022-08-15 ENCOUNTER — MYC MEDICAL ADVICE (OUTPATIENT)
Dept: FAMILY MEDICINE | Facility: CLINIC | Age: 52
End: 2022-08-15

## 2022-08-15 DIAGNOSIS — E78.5 HYPERLIPIDEMIA LDL GOAL <160: ICD-10-CM

## 2022-08-15 DIAGNOSIS — I10 ESSENTIAL HYPERTENSION WITH GOAL BLOOD PRESSURE LESS THAN 140/90: ICD-10-CM

## 2022-08-15 RX ORDER — SIMVASTATIN 40 MG
40 TABLET ORAL AT BEDTIME
Qty: 90 TABLET | Refills: 1 | Status: SHIPPED | OUTPATIENT
Start: 2022-08-15 | End: 2022-12-12

## 2022-08-15 RX ORDER — HYDROCHLOROTHIAZIDE 25 MG/1
25 TABLET ORAL DAILY
Qty: 90 TABLET | Refills: 2 | Status: SHIPPED | OUTPATIENT
Start: 2022-08-15 | End: 2022-12-12

## 2022-08-15 NOTE — TELEPHONE ENCOUNTER
Refills sent to [facility] pharmacy.  [facility] pharmacy in [place]  called and refills canceled.    Isabel TAVAREZ

## 2022-09-30 ENCOUNTER — TELEPHONE (OUTPATIENT)
Dept: NEUROSURGERY | Facility: CLINIC | Age: 52
End: 2022-09-30

## 2022-09-30 NOTE — TELEPHONE ENCOUNTER
Patient calling with update.    States Right Craniotomy incision is healed, hair grown over, no issues incision.  Patient continue to have stabbing pain above incision that go up his head, inconsistently and not real long.  Patient is taking Tizanidine, Tylenol and Ibuprofen for discomfort.    Explained medication might not be in system when pain strikes.  Will speak with a Provider and callback.  Patient denies facial pain, states best decision he has made to have the surgery.

## 2022-09-30 NOTE — TELEPHONE ENCOUNTER
Callback to patient, would like to set up a Telephone office visit with Alyssa Haro, please call Sara RN @ to schedule Telephone RTN office visit with Alyssa Haro NP.     No

## 2022-10-07 ENCOUNTER — TELEPHONE (OUTPATIENT)
Dept: NEUROSURGERY | Facility: CLINIC | Age: 52
End: 2022-10-07

## 2022-10-07 ENCOUNTER — HOSPITAL ENCOUNTER (OUTPATIENT)
Dept: CT IMAGING | Facility: CLINIC | Age: 52
Discharge: HOME | End: 2022-10-07
Attending: NEUROLOGICAL SURGERY | Admitting: NEUROLOGICAL SURGERY
Payer: COMMERCIAL

## 2022-10-07 DIAGNOSIS — L76.82 INCISIONAL PAIN: Primary | ICD-10-CM

## 2022-10-07 DIAGNOSIS — L76.82 INCISIONAL PAIN: ICD-10-CM

## 2022-10-07 PROCEDURE — 70450 CT HEAD/BRAIN W/O DYE: CPT

## 2022-10-07 NOTE — TELEPHONE ENCOUNTER
Spoke with Elmo,  Elmo continues to have incisional pain and above incision pain off and on.    Patient did see Neurology for Headaches and STEPHAN Hernandez is working on Emgality PA.    Patient appointment set for 10/12@ 3pm, will check in with Provider to see if CT Head without contrast needed. Or not.     Per Alyssa, CT Head without contrast prior to OV would be good to verify no issues with wound.    Elmo will call for Imaging appointment  Note sent to scheduling for appointment above.

## 2022-10-11 ENCOUNTER — MYC MEDICAL ADVICE (OUTPATIENT)
Dept: FAMILY MEDICINE | Facility: CLINIC | Age: 52
End: 2022-10-11

## 2022-10-11 DIAGNOSIS — Z12.11 COLON CANCER SCREENING: Primary | ICD-10-CM

## 2022-10-12 ENCOUNTER — VIRTUAL VISIT (OUTPATIENT)
Dept: NEUROSURGERY | Facility: CLINIC | Age: 52
End: 2022-10-12
Payer: COMMERCIAL

## 2022-10-12 DIAGNOSIS — G50.0 TRIGEMINAL NEURALGIA: Primary | ICD-10-CM

## 2022-10-12 PROCEDURE — 99213 OFFICE O/P EST LOW 20 MIN: CPT | Mod: 95 | Performed by: NURSE PRACTITIONER

## 2022-10-12 NOTE — PATIENT INSTRUCTIONS
Start Gabapentin 300 mg twice daily and of no major side effects, increase to 300 mg three times a day.     Follow-up with me as needed.

## 2022-10-12 NOTE — PROGRESS NOTES
Elmo is a 52 year old who is being evaluated via a billable telephone visit.      What phone number would you like to be contacted at? [phone]  How would you like to obtain your AVS? Nazario  Phone call duration: 15 minutes    [facility]  Department of Neurosurgery      Name: Jossue Torres  MRN: [id number]  Age: 52 year old  : 1970  Referring provider: Alyssa Haro  10/13/2022      Chief Complaint:   Right V3 Trigeminal Neuralgia   S/p Right MVD on 2022  Post operative wound infection, s/p Right-sided cranial wound washout on 2022  Follow up visit    History of Present Illness:   Jossue Torres is a 52 year old male with a history of right V3 trigeminal neuralgia status post right MVD on 2022, postoperative wound infection, status post right-sided cranial wound washout on 2022 who is seen today for a follow-up.  Patient recently contacted the clinic with some pain around his incision and today's visit was scheduled to discuss his symptoms.    Today, patient reports intermittent episodes of shooting pain which starts from his  Right sided MVD incision and spread upwards about 2 to 3 inches.  These episodes last less than 5 seconds and these are happening 15-20 times a day.  He is able to sleep okay.  Never wakes up from his sleep with this pain.  He had a head CT on 10/7 which ruled out any acute findings.  He has been taking Tylenol twice a day, tizanidine 4 mg twice a day, and ibuprofen as needed.    He was also referred to neurology for headache management and was started on Emgality and Relpax.  These medications are controlling his migraines fairly well.      Review of Systems:   Pertinent items are noted in HPI or as in patient entered ROS below, remainder of complete ROS is negative.   No flowsheet data found.     Physical Exam:   N/A (virtual visit)    Imaging:  10/7/22 head CT:                                                                   IMPRESSION:  1.   Interval right retrosigmoid craniectomy for trigeminal nerve microvascular decompression. No acute intracranial abnormality or collection overlying the craniectomy site.  2.  Findings suggestive of acute pansinusitis.    Assessment:  Right V3 Trigeminal Neuralgia   S/p Right MVD on 4/7/2022  Post operative wound infection, s/p Right-sided cranial wound washout on 5/6/2022  Follow up visit    Plan:  Symptoms consistent with neuropathic pain.  I do not think tizanidine is helping with the symptoms.  We will stop this medication gradually.  We will restart him on a lower dose of gabapentin at 300 mg twice daily and gradually increase to 300 mg 3 times a day.  Patient to follow-up with me as needed.       I spent 20 minutes on patient care activities related to this encounter on the date of service, including time spent reviewing the chart, obtaining history and examination and in counseling the patient, and in documentation in the electronic medical record.      Alyssa MURPHY, CNP  Department of Neurosurgery

## 2022-10-12 NOTE — LETTER
10/12/2022       RE: Jossue Torres  [address] [facility] [facility] [id number]     Dear Colleague,    Thank you for referring your patient, Jossue Torres, to the [facility] NEUROSURGERY CLINIC [place] at [facility]. Please see a copy of my visit note below.    Elmo is a 52 year old who is being evaluated via a billable telephone visit.      What phone number would you like to be contacted at? [phone]  How would you like to obtain your AVS? Nazario  Phone call duration: 15 minutes    [facility]  Department of Neurosurgery      Name: Jossue Torres  MRN: [id number]  Age: 52 year old  : 1970  Referring provider: Alyssa Haro  10/13/2022      Chief Complaint:   Right V3 Trigeminal Neuralgia   S/p Right MVD on 2022  Post operative wound infection, s/p Right-sided cranial wound washout on 2022  Follow up visit    History of Present Illness:   Jossue Torres is a 52 year old male with a history of right V3 trigeminal neuralgia status post right MVD on 2022, postoperative wound infection, status post right-sided cranial wound washout on 2022 who is seen today for a follow-up.  Patient recently contacted the clinic with some pain around his incision and today's visit was scheduled to discuss his symptoms.    Today, patient reports intermittent episodes of shooting pain which starts from his  Right sided MVD incision and spread upwards about 2 to 3 inches.  These episodes last less than 5 seconds and these are happening 15-20 times a day.  He is able to sleep okay.  Never wakes up from his sleep with this pain.  He had a head CT on 10/7 which ruled out any acute findings.  He has been taking Tylenol twice a day, tizanidine 4 mg twice a day, and ibuprofen as needed.    He was also referred to neurology for headache management and was started on Emgality and Relpax.  These medications are controlling his migraines fairly  well.      Review of Systems:   Pertinent items are noted in HPI or as in patient entered ROS below, remainder of complete ROS is negative.   No flowsheet data found.     Physical Exam:   N/A (virtual visit)    Imaging:  10/7/22 head CT:                                                                   IMPRESSION:  1.  Interval right retrosigmoid craniectomy for trigeminal nerve microvascular decompression. No acute intracranial abnormality or collection overlying the craniectomy site.  2.  Findings suggestive of acute pansinusitis.    Assessment:  Right V3 Trigeminal Neuralgia   S/p Right MVD on 4/7/2022  Post operative wound infection, s/p Right-sided cranial wound washout on 5/6/2022  Follow up visit    Plan:  Symptoms consistent with neuropathic pain.  I do not think tizanidine is helping with the symptoms.  We will stop this medication gradually.  We will restart him on a lower dose of gabapentin at 300 mg twice daily and gradually increase to 300 mg 3 times a day.  Patient to follow-up with me as needed.       I spent 20 minutes on patient care activities related to this encounter on the date of service, including time spent reviewing the chart, obtaining history and examination and in counseling the patient, and in documentation in the electronic medical record.        Again, thank you for allowing me to participate in the care of your patient.      Sincerely,    KATHERINE Reeves CNP

## 2022-10-13 ENCOUNTER — VIRTUAL VISIT (OUTPATIENT)
Dept: NEUROLOGY | Facility: CLINIC | Age: 52
End: 2022-10-13
Payer: COMMERCIAL

## 2022-10-13 DIAGNOSIS — G43.709 CHRONIC MIGRAINE WITHOUT AURA WITHOUT STATUS MIGRAINOSUS, NOT INTRACTABLE: Primary | ICD-10-CM

## 2022-10-13 PROCEDURE — 99213 OFFICE O/P EST LOW 20 MIN: CPT | Mod: 95 | Performed by: NURSE PRACTITIONER

## 2022-10-13 ASSESSMENT — MIGRAINE DISABILITY ASSESSMENT (MIDAS)
HOW OFTEN WERE SOCIAL ACTIVITIES MISSED DUE TO HEADACHES: 0
HOW MANY DAYS DID YOU NOT DO HOUSEWORK BECAUSE OF HEADACHES: 0
HOW MANY DAYS IN THE PAST 3 MONTHS HAVE YOU HAD A HEADACHE: 15
HOW MANY DAYS WAS YOUR PRODUCTIVITY CUT IN HALF BECAUSE OF HEADACHES: 0
HOW MANY DAYS DID YOU MISS WORK OR SCHOOL BECAUSE OF HEADACHES: 0
HOW MANY DAYS WAS HOUSEWORK PRODUCTIVITY CUT IN HALF DUE TO HEADACHES: 6
TOTAL SCORE: 6
ON A SCALE FROM 0-10 ON AVERAGE HOW PAINFUL WERE HEADACHES: 7

## 2022-10-13 ASSESSMENT — HEADACHE IMPACT TEST (HIT 6)
HOW OFTEN HAVE YOU FELT TOO TIRED TO WORK BECAUSE OF YOUR HEADACHES: NEVER
HOW OFTEN DID HEADACHS LIMIT CONCENTRATION ON WORK OR DAILY ACTIVITY: NEVER
HOW OFTEN HAVE YOU FELT FED UP OR IRRITATED BECAUSE OF YOUR HEADACHES: SOMETIMES
HIT6 TOTAL SCORE: 43
WHEN YOU HAVE HEADACHES HOW OFTEN IS THE PAIN SEVERE: VERY OFTEN
HOW OFTEN HAVE YOU FELT FED UP OR IRRITATED BECAUSE OF YOUR HEADACHES: SOMETIMES
HIT6 TOTAL SCORE: 43
WHEN YOU HAVE A HEADACHE HOW OFTEN DO YOU WISH YOU COULD LIE DOWN: SOMETIMES
HOW OFTEN DID HEADACHS LIMIT CONCENTRATION ON WORK OR DAILY ACTIVITY: NEVER
WHEN YOU HAVE HEADACHES HOW OFTEN IS THE PAIN SEVERE: VERY OFTEN
WHEN YOU HAVE A HEADACHE HOW OFTEN DO YOU WISH YOU COULD LIE DOWN: SOMETIMES
HOW OFTEN HAVE YOU FELT TOO TIRED TO WORK BECAUSE OF YOUR HEADACHES: NEVER

## 2022-10-13 NOTE — PATIENT INSTRUCTIONS
Follow up in 9 months or sooner if needed     Plan:  No changes in treatment plan-Emgality for prevention and eletriptan as needed.   Follow up in 9 months or sooner if needed

## 2022-10-13 NOTE — PROGRESS NOTES
"Elmo is a 52 year old who is being evaluated via a billable video visit.      How would you like to obtain your AVS? MyChart  If the video visit is dropped, the invitation should be resent by: Text to cell phone: [phone]  Will anyone else be joining your video visit? No        Video-Visit Details    Video Start Time: 11:13 AM    Type of service:  Video Visit    Video End Time:11:37 AM    Originating Location (pt. Location): Home    Distant Location (provider location):  [facility] NEUROLOGY [facility]     Platform used for Video Visit: Neovacs     [facility] follow up:  Started Emgality end of June 2022. Late with Emgality by 11 days because of insurance.   Reports that Emgality does work and month of July -5 headache and August-2 headaches  September -5 headaches  -\"one heck of improvement \" - would get at least every other day and having only 5 migraine headaches per month is a significant improvement vs at least 15 /month. Improved over 50% and no side effects. Able to function and improvement in life quality.   Rescue treatment -eletriptan as needed and has been helpful and works every single time after starting Emgality.   Patient still has pain from TN surgery -started gabapentin and no concern with interaction. Reassurance given and no interaction concerns between Emgality and gabapentin.     Plan:  No changes in treatment plan-Emgality for prevention and eletriptan as needed.   Follow up in 9 months or sooner if needed      Patient is alert and no in apparent acute distress,  mentation appears normal, judgement and insight intact, normal speech.    I discussed all my recommendations with Jossue Torres who verbalizes understanding and comfortable with the plan.  All of patient's questions were answered from the best of my knowledge.  Patient is in agreement with the plan.     20 minutes spent on the date of the encounter doing video access, chart  review,  meds review, treatment " plan, documentation and further activities as noted above    KATHERINE Castellanos, CNP [facility]  Headache certified  [facility]

## 2022-10-13 NOTE — LETTER
"10/13/2022       RE: Jossue Torres  [address] [facility] [address]     Dear Colleague,    Thank you for referring your patient, Jossue Torres, to the [facility] NEUROLOGY CLINIC [place] at [facility]. Please see a copy of my visit note below.    [facility] follow up:  Started Emgality end of June 2022. Late with Emgality by 11 days because of insurance.   Reports that Emgality does work and month of July -5 headache and August-2 headaches  September -5 headaches  -\"one heck of improvement \" - would get at least every other day and having only 5 migraine headaches per month is a significant improvement vs at least 15 /month. Improved over 50% and no side effects. Able to function and improvement in life quality.   Rescue treatment -eletriptan as needed and has been helpful and works every single time after starting Emgality.   Patient still has pain from TN surgery -started gabapentin and no concern with interaction. Reassurance given and no interaction concerns between Emgality and gabapentin.     Plan:  No changes in treatment plan-Emgality for prevention and eletriptan as needed.   Follow up in 9 months or sooner if needed      Patient is alert and no in apparent acute distress,  mentation appears normal, judgement and insight intact, normal speech.    I discussed all my recommendations with Jossue QUEZADA Melissa who verbalizes understanding and comfortable with the plan.  All of patient's questions were answered from the best of my knowledge.  Patient is in agreement with the plan.     20 minutes spent on the date of the encounter doing video access, chart  review,  meds review, treatment plan, documentation and further activities as noted above    KATHERINE Castellanos, CNP [facility]  Headache certified  [facility]  "

## 2022-10-31 DIAGNOSIS — G50.0 TRIGEMINAL NEURALGIA: ICD-10-CM

## 2022-10-31 RX ORDER — TIZANIDINE HYDROCHLORIDE 4 MG/1
4 CAPSULE, GELATIN COATED ORAL 3 TIMES DAILY
Qty: 90 CAPSULE | Refills: 2 | Status: SHIPPED | OUTPATIENT
Start: 2022-10-31 | End: 2022-12-12

## 2022-10-31 NOTE — TELEPHONE ENCOUNTER
Requested Prescriptions   Pending Prescriptions Disp Refills     tiZANidine (ZANAFLEX) 4 MG capsule 90 capsule 2     Sig: Take 1 capsule (4 mg) by mouth 3 times daily       There is no refill protocol information for this order        Last Written Prescription Date:  7/6/22  Last Fill Quantity: 90,  # refills: 2   Last office visit: 2/11/2022 with prescribing provider:     Future Office Visit:

## 2022-11-16 ENCOUNTER — MYC MEDICAL ADVICE (OUTPATIENT)
Dept: NEUROSURGERY | Facility: CLINIC | Age: 52
End: 2022-11-16

## 2022-11-16 DIAGNOSIS — G50.0 TRIGEMINAL NEURALGIA: Primary | ICD-10-CM

## 2022-11-16 RX ORDER — GABAPENTIN 300 MG/1
300 CAPSULE ORAL 3 TIMES DAILY
Qty: 90 CAPSULE | Refills: 1 | Status: SHIPPED | OUTPATIENT
Start: 2022-11-16 | End: 2023-01-10

## 2022-11-19 ENCOUNTER — HEALTH MAINTENANCE LETTER (OUTPATIENT)
Age: 52
End: 2022-11-19

## 2022-12-12 ENCOUNTER — OFFICE VISIT (OUTPATIENT)
Dept: FAMILY MEDICINE | Facility: CLINIC | Age: 52
End: 2022-12-12
Payer: COMMERCIAL

## 2022-12-12 VITALS
HEART RATE: 83 BPM | HEIGHT: 71 IN | SYSTOLIC BLOOD PRESSURE: 116 MMHG | RESPIRATION RATE: 14 BRPM | BODY MASS INDEX: 30.52 KG/M2 | WEIGHT: 218 LBS | OXYGEN SATURATION: 96 % | DIASTOLIC BLOOD PRESSURE: 80 MMHG | TEMPERATURE: 97.6 F

## 2022-12-12 DIAGNOSIS — E78.5 HYPERLIPIDEMIA LDL GOAL <160: ICD-10-CM

## 2022-12-12 DIAGNOSIS — G43.709 CHRONIC MIGRAINE WITHOUT AURA WITHOUT STATUS MIGRAINOSUS, NOT INTRACTABLE: ICD-10-CM

## 2022-12-12 DIAGNOSIS — Z12.11 SCREEN FOR COLON CANCER: ICD-10-CM

## 2022-12-12 DIAGNOSIS — I10 ESSENTIAL HYPERTENSION WITH GOAL BLOOD PRESSURE LESS THAN 140/90: ICD-10-CM

## 2022-12-12 DIAGNOSIS — Z00.00 ROUTINE GENERAL MEDICAL EXAMINATION AT A HEALTH CARE FACILITY: Primary | ICD-10-CM

## 2022-12-12 DIAGNOSIS — Z23 NEED FOR TDAP VACCINATION: ICD-10-CM

## 2022-12-12 DIAGNOSIS — Z11.59 NEED FOR HEPATITIS C SCREENING TEST: ICD-10-CM

## 2022-12-12 DIAGNOSIS — Z11.4 SCREENING FOR HIV (HUMAN IMMUNODEFICIENCY VIRUS): ICD-10-CM

## 2022-12-12 LAB
ANION GAP SERPL CALCULATED.3IONS-SCNC: 10 MMOL/L (ref 7–15)
BUN SERPL-MCNC: 16.5 MG/DL (ref 6–20)
CALCIUM SERPL-MCNC: 9.3 MG/DL (ref 8.6–10)
CHLORIDE SERPL-SCNC: 100 MMOL/L (ref 98–107)
CHOLEST SERPL-MCNC: 206 MG/DL
CREAT SERPL-MCNC: 1.31 MG/DL (ref 0.67–1.17)
DEPRECATED HCO3 PLAS-SCNC: 29 MMOL/L (ref 22–29)
GFR SERPL CREATININE-BSD FRML MDRD: 65 ML/MIN/1.73M2
GLUCOSE SERPL-MCNC: 90 MG/DL (ref 70–99)
HDLC SERPL-MCNC: 42 MG/DL
LDLC SERPL CALC-MCNC: 112 MG/DL
NONHDLC SERPL-MCNC: 164 MG/DL
POTASSIUM SERPL-SCNC: 3.8 MMOL/L (ref 3.4–5.3)
SODIUM SERPL-SCNC: 139 MMOL/L (ref 136–145)
TRIGL SERPL-MCNC: 258 MG/DL

## 2022-12-12 PROCEDURE — 80048 BASIC METABOLIC PNL TOTAL CA: CPT | Performed by: NURSE PRACTITIONER

## 2022-12-12 PROCEDURE — 90471 IMMUNIZATION ADMIN: CPT | Performed by: NURSE PRACTITIONER

## 2022-12-12 PROCEDURE — 90682 RIV4 VACC RECOMBINANT DNA IM: CPT | Performed by: NURSE PRACTITIONER

## 2022-12-12 PROCEDURE — 90715 TDAP VACCINE 7 YRS/> IM: CPT | Performed by: NURSE PRACTITIONER

## 2022-12-12 PROCEDURE — 80061 LIPID PANEL: CPT | Performed by: NURSE PRACTITIONER

## 2022-12-12 PROCEDURE — 87389 HIV-1 AG W/HIV-1&-2 AB AG IA: CPT | Performed by: NURSE PRACTITIONER

## 2022-12-12 PROCEDURE — 90472 IMMUNIZATION ADMIN EACH ADD: CPT | Performed by: NURSE PRACTITIONER

## 2022-12-12 PROCEDURE — 99396 PREV VISIT EST AGE 40-64: CPT | Mod: 25 | Performed by: NURSE PRACTITIONER

## 2022-12-12 PROCEDURE — 99214 OFFICE O/P EST MOD 30 MIN: CPT | Mod: 25 | Performed by: NURSE PRACTITIONER

## 2022-12-12 PROCEDURE — 86803 HEPATITIS C AB TEST: CPT | Performed by: NURSE PRACTITIONER

## 2022-12-12 PROCEDURE — 36415 COLL VENOUS BLD VENIPUNCTURE: CPT | Performed by: NURSE PRACTITIONER

## 2022-12-12 RX ORDER — HYDROCHLOROTHIAZIDE 25 MG/1
25 TABLET ORAL DAILY
Qty: 90 TABLET | Refills: 3 | Status: SHIPPED | OUTPATIENT
Start: 2022-12-12 | End: 2023-12-15

## 2022-12-12 RX ORDER — ELETRIPTAN HYDROBROMIDE 40 MG/1
TABLET, FILM COATED ORAL
Qty: 24 TABLET | Refills: 11 | Status: SHIPPED | OUTPATIENT
Start: 2022-12-12 | End: 2023-12-15

## 2022-12-12 RX ORDER — LOSARTAN POTASSIUM 100 MG/1
100 TABLET ORAL DAILY
Qty: 90 TABLET | Refills: 3 | Status: SHIPPED | OUTPATIENT
Start: 2022-12-12 | End: 2023-12-15

## 2022-12-12 RX ORDER — SIMVASTATIN 40 MG
40 TABLET ORAL AT BEDTIME
Qty: 90 TABLET | Refills: 3 | Status: SHIPPED | OUTPATIENT
Start: 2022-12-12 | End: 2023-12-15

## 2022-12-12 ASSESSMENT — ENCOUNTER SYMPTOMS
PARESTHESIAS: 0
SORE THROAT: 0
DIARRHEA: 0
HEADACHES: 0
NAUSEA: 0
HEMATURIA: 0
MYALGIAS: 0
HEARTBURN: 0
SHORTNESS OF BREATH: 0
JOINT SWELLING: 0
DIZZINESS: 0
ABDOMINAL PAIN: 0
DYSURIA: 0
CONSTIPATION: 0
NERVOUS/ANXIOUS: 0
COUGH: 0
CHILLS: 0
HEMATOCHEZIA: 0
FREQUENCY: 0
EYE PAIN: 0
WEAKNESS: 0
PALPITATIONS: 0
FEVER: 0

## 2022-12-12 ASSESSMENT — PAIN SCALES - GENERAL: PAINLEVEL: NO PAIN (0)

## 2022-12-12 NOTE — PROGRESS NOTES
SUBJECTIVE:   CC: Elmo is an 52 year old who presents for preventative health visit.        Patient has been advised of split billing requirements and indicates understanding: Yes     Healthy Habits:     Getting at least 3 servings of Calcium per day:  Yes    Bi-annual eye exam:  NO    Dental care twice a year:  Yes    Sleep apnea or symptoms of sleep apnea:  None    Diet:  Regular (no restrictions)    Frequency of exercise:  1 day/week    Duration of exercise:  N/A    Taking medications regularly:  Yes    Medication side effects:  None    PHQ-2 Total Score: 0    Additional concerns today:  No    Migraine Headaches    for his migraine management. He is on emgality which is managed by Neurology for this now. He would like a refill of the relpax for abatment. He continues on gabapentin for surgical site pain and scalp pain. He is continuing for following with neuro.     Hyperlipidemia Follow-Up    Are you regularly taking any medication or supplement to lower your cholesterol?   Yes- SIMVASTATIN     Are you having muscle aches or other side effects that you think could be caused by your cholesterol lowering medication?  No    Hypertension Follow-up    Do you check your blood pressure regularly outside of the clinic? No     Are you following a low salt diet? No    Are your blood pressures ever more than 140 on the top number (systolic) OR more   than 90 on the bottom number (diastolic), for example 140/90? Unknown       Today's PHQ-2 Score:   PHQ-2 ( 1999 [employer]) 12/12/2022   Q1: Little interest or pleasure in doing things 0   Q2: Feeling down, depressed or hopeless 0   PHQ-2 Score 0   PHQ-2 Total Score (12-17 Years)- Positive if 3 or more points; Administer PHQ-A if positive -   Q1: Little interest or pleasure in doing things Not at all   Q2: Feeling down, depressed or hopeless Not at all   PHQ-2 Score 0       Have you ever done Advance Care Planning? (For example, a Health Directive, POLST, or a discussion with a  medical provider or your loved ones about your wishes): No, advance care planning information given to patient to review.  Patient declined advance care planning discussion at this time.    Social History     Tobacco Use     Smoking status: Every Day     Packs/day: 0.50     Years: 15.00     Pack years: 7.50     Types: Cigarettes     Smokeless tobacco: Former     Types: Chew   Substance Use Topics     Alcohol use: Yes     Comment: rare     If you drink alcohol do you typically have >3 drinks per day or >7 drinks per week? No    Alcohol Use 12/12/2022   Prescreen: >3 drinks/day or >7 drinks/week? No   Prescreen: >3 drinks/day or >7 drinks/week? -       Last PSA: No results found for: PSA    Reviewed orders with patient. Reviewed health maintenance and updated orders accordingly - Yes  Lab work is in process  Labs reviewed in EPIC  BP Readings from Last 3 Encounters:   12/12/22 116/80   05/24/22 130/87   05/11/22 (!) 143/83    Wt Readings from Last 3 Encounters:   12/12/22 98.9 kg (218 lb)   05/24/22 96.6 kg (213 lb)   05/07/22 98.6 kg (217 lb 4.8 oz)                  Patient Active Problem List   Diagnosis     Chronic migraine without aura without status migrainosus, not intractable     Esophageal reflux     Tobacco use disorder     CARDIOVASCULAR SCREENING; LDL GOAL LESS THAN 160     Hypertension goal BP (blood pressure) < 140/90     Anxiety     Trigeminal neuralgia     Status post craniotomy     Postoperative wound infection     Past Surgical History:   Procedure Laterality Date     CRANIOTOMY, DECOMPRESS NEUROVASCULAR, COMBINED Right 04/07/2022    Procedure: Right microvascular decompression  Latex Free;  Surgeon: Grey Simeon MD;  Location: [facility] OR     HERNIA REPAIR  Baby     IRRIGATION AND DEBRIDEMENT CRANIUM Right 05/06/2022    Procedure: Retro-Mastoid wound washout;  Surgeon: Grey Simeon MD;  Location: [facility] OR     PICC INSERTION - DOUBLE LUMEN Right 05/10/2022    right basilic 5 fr dl picc 44  cm     SURGICAL HISTORY OF -   1970    right inguinal hernioplasty     SURGICAL HISTORY OF -   11/16/1993    lysis of two penile adhesions and cauterization of penile condyloma.       Social History     Tobacco Use     Smoking status: Every Day     Packs/day: 0.50     Years: 15.00     Pack years: 7.50     Types: Cigarettes     Smokeless tobacco: Former     Types: Chew   Substance Use Topics     Alcohol use: Yes     Comment: rare     Family History   Problem Relation Age of Onset     Hypertension Father      Hypertension Brother      C.A.D. Maternal Grandmother         40's     C.A.D. Maternal Aunt         40's     C.A.D. Maternal Uncle         40's     Anesthesia Reaction No family hx of      Deep Vein Thrombosis (DVT) No family hx of          Current Outpatient Medications   Medication Sig Dispense Refill     acetaminophen (TYLENOL) 650 MG CR tablet Take 1,300 mg by mouth every 8 hours as needed for mild pain or fever 3 TIMES DAILY       eletriptan (RELPAX) 40 MG tablet Take 1 tablet by mouth. repeat after 2 hours if needed. 24 tablet 11     gabapentin (NEURONTIN) 300 MG capsule Take 1 capsule (300 mg) by mouth 3 times daily 90 capsule 1     galcanezumab-gnlm (EMGALITY) 120 MG/ML injection Inject 240 mg subcutaneous first month and then inject 120 mg subcutaneous every 28 days 2 mL 11     hydrochlorothiazide (HYDRODIURIL) 25 MG tablet Take 1 tablet (25 mg) by mouth daily 90 tablet 3     losartan (COZAAR) 100 MG tablet Take 1 tablet (100 mg) by mouth daily 90 tablet 3     simvastatin (ZOCOR) 40 MG tablet Take 1 tablet (40 mg) by mouth At Bedtime 90 tablet 3     Allergies   Allergen Reactions     Nkda [No Known Drug Allergies]      Recent Labs   Lab Test 05/31/22  1343 05/18/22  1336 02/11/22  1625 12/09/21  1440 01/04/21  1716 12/11/19  1458 08/19/19  0000 09/28/18  0000 09/01/16  1651   LDL  --   --   --  159* 127*  --  173*   < > 135*   HDL  --   --   --  45 49  --  48*   < > 37*   TRIG  --   --   --  180*  200*  --   --   --  295*   ALT 51  --   --  41  --   --   --   --   --    CR 1.47* 1.31*   < > 1.07 1.15 1.18  --   --  1.34*   GFRESTIMATED 57* 65   < > 79 73 71  --   --  57*   GFRESTBLACK  --   --   --   --  85 83  --   --  69   POTASSIUM 3.7 3.9   < > 4.6 3.8 4.1  --   --  4.0    < > = values in this interval not displayed.        Reviewed and updated as needed this visit by clinical staff   Tobacco  Allergies  Meds  Problems  Med Hx  Surg Hx  Fam Hx          Reviewed and updated as needed this visit by Provider   Tobacco  Allergies  Meds  Problems  Med Hx  Surg Hx  Fam Hx         Past Medical History:   Diagnosis Date     Herpes zoster without mention of complication 01/01/1991     Hypertension       Past Surgical History:   Procedure Laterality Date     CRANIOTOMY, DECOMPRESS NEUROVASCULAR, COMBINED Right 04/07/2022    Procedure: Right microvascular decompression  Latex Free;  Surgeon: Grey Simeon MD;  Location: [facility] OR     HERNIA REPAIR  Baby     IRRIGATION AND DEBRIDEMENT CRANIUM Right 05/06/2022    Procedure: Retro-Mastoid wound washout;  Surgeon: Grey Simeon MD;  Location: [facility] OR     PICC INSERTION - DOUBLE LUMEN Right 05/10/2022    right basilic 5 fr dl picc 44 cm     SURGICAL HISTORY OF -   1970    right inguinal hernioplasty     SURGICAL HISTORY OF -   11/16/1993    lysis of two penile adhesions and cauterization of penile condyloma.       Review of Systems   Constitutional: Negative for chills and fever.   HENT: Negative for congestion, ear pain, hearing loss and sore throat.    Eyes: Negative for pain and visual disturbance.   Respiratory: Negative for cough and shortness of breath.    Cardiovascular: Negative for chest pain, palpitations and peripheral edema.   Gastrointestinal: Negative for abdominal pain, constipation, diarrhea, heartburn, hematochezia and nausea.   Genitourinary: Negative for dysuria, frequency, genital sores, hematuria, impotence, penile  "discharge and urgency.   Musculoskeletal: Negative for joint swelling and myalgias.   Skin: Negative for rash.   Neurological: Negative for dizziness, weakness, headaches and paresthesias.   Psychiatric/Behavioral: Negative for mood changes. The patient is not nervous/anxious.          OBJECTIVE:   /80   Pulse 83   Temp 97.6  F (36.4  C) (Tympanic)   Resp 14   Ht 1.803 m (5' 11\")   Wt 98.9 kg (218 lb)   SpO2 96%   BMI 30.40 kg/m      Physical Exam  GENERAL: healthy, alert and no distress  EYES: Eyes grossly normal to inspection, PERRL and conjunctivae and sclerae normal  HENT: ear canals and TM's normal, nose and mouth without ulcers or lesions  NECK: no adenopathy, no asymmetry, masses, or scars and thyroid normal to palpation  RESP: lungs clear to auscultation - no rales, rhonchi or wheezes  CV: regular rate and rhythm, normal S1 S2, no S3 or S4, no murmur, click or rub, no peripheral edema and peripheral pulses strong  ABDOMEN: soft, nontender, no hepatosplenomegaly, no masses and bowel sounds normal  MS: no gross musculoskeletal defects noted, no edema  SKIN: no suspicious lesions or rashes  NEURO: Normal strength and tone, mentation intact and speech normal  PSYCH: mentation appears normal, affect normal/bright    Diagnostic Test Results:  Labs reviewed in Epic    ASSESSMENT/PLAN:   1. Routine general medical examination at a health care facility  Healthy Male exam completed today.  I discussed preventative measures with the patient including continuing with lifestyle modifications of a balanced diet and regular cardiovascular exercise. I encouraged patient to follow-up yearly for annual physicals and sooner as needed.       2. Essential hypertension with goal blood pressure less than 140/90  Blood pressure is well controlled continue on losartan.  Without side effects.  - hydrochlorothiazide (HYDRODIURIL) 25 MG tablet; Take 1 tablet (25 mg) by mouth daily  Dispense: 90 tablet; Refill: 3  - " "losartan (COZAAR) 100 MG tablet; Take 1 tablet (100 mg) by mouth daily  Dispense: 90 tablet; Refill: 3  - Basic metabolic panel  (Ca, Cl, CO2, Creat, Gluc, K, Na, BUN); Future  - Basic metabolic panel  (Ca, Cl, CO2, Creat, Gluc, K, Na, BUN)    3. Hyperlipidemia LDL goal <160  Will check lipids, continue on statin  - simvastatin (ZOCOR) 40 MG tablet; Take 1 tablet (40 mg) by mouth At Bedtime  Dispense: 90 tablet; Refill: 3  - Lipid panel reflex to direct LDL Non-fasting; Future  - Lipid panel reflex to direct LDL Non-fasting    4. Chronic migraine without aura without status migrainosus, not intractable  Refill Relpax today.improving with frequency  - eletriptan (RELPAX) 40 MG tablet; Take 1 tablet by mouth. repeat after 2 hours if needed.  Dispense: 24 tablet; Refill: 11    5. Screen for colon cancer  scheduled    6. Screening for HIV (human immunodeficiency virus)  - HIV Antigen Antibody Combo; Future  - HIV Antigen Antibody Combo    7. Need for hepatitis C screening test  - Hepatitis C Screen Reflex to HCV RNA Quant and Genotype; Future  - Hepatitis C Screen Reflex to HCV RNA Quant and Genotype    8. Need for Tdap vaccination  Administer.       Patient has been advised of split billing requirements and indicates understanding: Yes      COUNSELING:   Reviewed preventive health counseling, as reflected in patient instructions       Regular exercise       Healthy diet/nutrition      BMI:   Estimated body mass index is 30.4 kg/m  as calculated from the following:    Height as of this encounter: 1.803 m (5' 11\").    Weight as of this encounter: 98.9 kg (218 lb).   Weight management plan: Discussed healthy diet and exercise guidelines      He reports that he has been smoking cigarettes. He has a 7.50 pack-year smoking history. He has quit using smokeless tobacco.  His smokeless tobacco use included chew.  Nicotine/Tobacco Cessation Plan:   Information offered: Patient not interested at this time            Tamia FINCH" KATHERINE Yeh CNP  [facility]

## 2022-12-12 NOTE — H&P (VIEW-ONLY)
SUBJECTIVE:   CC: Elmo is an 52 year old who presents for preventative health visit.        Patient has been advised of split billing requirements and indicates understanding: Yes     Healthy Habits:     Getting at least 3 servings of Calcium per day:  Yes    Bi-annual eye exam:  NO    Dental care twice a year:  Yes    Sleep apnea or symptoms of sleep apnea:  None    Diet:  Regular (no restrictions)    Frequency of exercise:  1 day/week    Duration of exercise:  N/A    Taking medications regularly:  Yes    Medication side effects:  None    PHQ-2 Total Score: 0    Additional concerns today:  No    Migraine Headaches    for his migraine management. He is on emgality which is managed by Neurology for this now. He would like a refill of the relpax for abatment. He continues on gabapentin for surgical site pain and scalp pain. He is continuing for following with neuro.     Hyperlipidemia Follow-Up    Are you regularly taking any medication or supplement to lower your cholesterol?   Yes- SIMVASTATIN     Are you having muscle aches or other side effects that you think could be caused by your cholesterol lowering medication?  No    Hypertension Follow-up    Do you check your blood pressure regularly outside of the clinic? No     Are you following a low salt diet? No    Are your blood pressures ever more than 140 on the top number (systolic) OR more   than 90 on the bottom number (diastolic), for example 140/90? Unknown       Today's PHQ-2 Score:   PHQ-2 ( 1999 [employer]) 12/12/2022   Q1: Little interest or pleasure in doing things 0   Q2: Feeling down, depressed or hopeless 0   PHQ-2 Score 0   PHQ-2 Total Score (12-17 Years)- Positive if 3 or more points; Administer PHQ-A if positive -   Q1: Little interest or pleasure in doing things Not at all   Q2: Feeling down, depressed or hopeless Not at all   PHQ-2 Score 0       Have you ever done Advance Care Planning? (For example, a Health Directive, POLST, or a discussion with a  medical provider or your loved ones about your wishes): No, advance care planning information given to patient to review.  Patient declined advance care planning discussion at this time.    Social History     Tobacco Use     Smoking status: Every Day     Packs/day: 0.50     Years: 15.00     Pack years: 7.50     Types: Cigarettes     Smokeless tobacco: Former     Types: Chew   Substance Use Topics     Alcohol use: Yes     Comment: rare     If you drink alcohol do you typically have >3 drinks per day or >7 drinks per week? No    Alcohol Use 12/12/2022   Prescreen: >3 drinks/day or >7 drinks/week? No   Prescreen: >3 drinks/day or >7 drinks/week? -       Last PSA: No results found for: PSA    Reviewed orders with patient. Reviewed health maintenance and updated orders accordingly - Yes  Lab work is in process  Labs reviewed in EPIC  BP Readings from Last 3 Encounters:   12/12/22 116/80   05/24/22 130/87   05/11/22 (!) 143/83    Wt Readings from Last 3 Encounters:   12/12/22 98.9 kg (218 lb)   05/24/22 96.6 kg (213 lb)   05/07/22 98.6 kg (217 lb 4.8 oz)                  Patient Active Problem List   Diagnosis     Chronic migraine without aura without status migrainosus, not intractable     Esophageal reflux     Tobacco use disorder     CARDIOVASCULAR SCREENING; LDL GOAL LESS THAN 160     Hypertension goal BP (blood pressure) < 140/90     Anxiety     Trigeminal neuralgia     Status post craniotomy     Postoperative wound infection     Past Surgical History:   Procedure Laterality Date     CRANIOTOMY, DECOMPRESS NEUROVASCULAR, COMBINED Right 04/07/2022    Procedure: Right microvascular decompression  Latex Free;  Surgeon: Grey Simeon MD;  Location: [facility] OR     HERNIA REPAIR  Baby     IRRIGATION AND DEBRIDEMENT CRANIUM Right 05/06/2022    Procedure: Retro-Mastoid wound washout;  Surgeon: Grey Simeon MD;  Location: [facility] OR     PICC INSERTION - DOUBLE LUMEN Right 05/10/2022    right basilic 5 fr dl picc 44  cm     SURGICAL HISTORY OF -   1969    right inguinal hernioplasty     SURGICAL HISTORY OF -   11/16/1993    lysis of two penile adhesions and cauterization of penile condyloma.       Social History     Tobacco Use     Smoking status: Every Day     Packs/day: 0.50     Years: 15.00     Pack years: 7.50     Types: Cigarettes     Smokeless tobacco: Former     Types: Chew   Substance Use Topics     Alcohol use: Yes     Comment: rare     Family History   Problem Relation Age of Onset     Hypertension Father      Hypertension Brother      C.A.D. Maternal Grandmother         40's     C.A.D. Maternal Aunt         40's     C.A.D. Maternal Uncle         40's     Anesthesia Reaction No family hx of      Deep Vein Thrombosis (DVT) No family hx of          Current Outpatient Medications   Medication Sig Dispense Refill     acetaminophen (TYLENOL) 650 MG CR tablet Take 1,300 mg by mouth every 8 hours as needed for mild pain or fever 3 TIMES DAILY       eletriptan (RELPAX) 40 MG tablet Take 1 tablet by mouth. repeat after 2 hours if needed. 24 tablet 11     gabapentin (NEURONTIN) 300 MG capsule Take 1 capsule (300 mg) by mouth 3 times daily 90 capsule 1     galcanezumab-gnlm (EMGALITY) 120 MG/ML injection Inject 240 mg subcutaneous first month and then inject 120 mg subcutaneous every 28 days 2 mL 11     hydrochlorothiazide (HYDRODIURIL) 25 MG tablet Take 1 tablet (25 mg) by mouth daily 90 tablet 3     losartan (COZAAR) 100 MG tablet Take 1 tablet (100 mg) by mouth daily 90 tablet 3     simvastatin (ZOCOR) 40 MG tablet Take 1 tablet (40 mg) by mouth At Bedtime 90 tablet 3     Allergies   Allergen Reactions     Nkda [No Known Drug Allergies]      Recent Labs   Lab Test 05/31/22  1343 05/18/22  1336 02/11/22  1625 12/09/21  1440 01/04/21  1716 12/11/19  1458 08/19/19  0000 09/28/18  0000 09/01/16  1651   LDL  --   --   --  159* 127*  --  173*   < > 135*   HDL  --   --   --  45 49  --  48*   < > 37*   TRIG  --   --   --  180*  200*  --   --   --  295*   ALT 51  --   --  41  --   --   --   --   --    CR 1.47* 1.31*   < > 1.07 1.15 1.18  --   --  1.34*   GFRESTIMATED 57* 65   < > 79 73 71  --   --  57*   GFRESTBLACK  --   --   --   --  85 83  --   --  69   POTASSIUM 3.7 3.9   < > 4.6 3.8 4.1  --   --  4.0    < > = values in this interval not displayed.        Reviewed and updated as needed this visit by clinical staff   Tobacco  Allergies  Meds  Problems  Med Hx  Surg Hx  Fam Hx          Reviewed and updated as needed this visit by Provider   Tobacco  Allergies  Meds  Problems  Med Hx  Surg Hx  Fam Hx         Past Medical History:   Diagnosis Date     Herpes zoster without mention of complication 01/01/1991     Hypertension       Past Surgical History:   Procedure Laterality Date     CRANIOTOMY, DECOMPRESS NEUROVASCULAR, COMBINED Right 04/07/2022    Procedure: Right microvascular decompression  Latex Free;  Surgeon: Grey Simeon MD;  Location: [facility]     HERNIA REPAIR  Baby     IRRIGATION AND DEBRIDEMENT CRANIUM Right 05/06/2022    Procedure: Retro-Mastoid wound washout;  Surgeon: Grey Simeon MD;  Location: [facility] OR     PICC INSERTION - DOUBLE LUMEN Right 05/10/2022    right basilic 5 fr dl picc 44 cm     SURGICAL HISTORY OF -   1969    right inguinal hernioplasty     SURGICAL HISTORY OF -   11/16/1993    lysis of two penile adhesions and cauterization of penile condyloma.       Review of Systems   Constitutional: Negative for chills and fever.   HENT: Negative for congestion, ear pain, hearing loss and sore throat.    Eyes: Negative for pain and visual disturbance.   Respiratory: Negative for cough and shortness of breath.    Cardiovascular: Negative for chest pain, palpitations and peripheral edema.   Gastrointestinal: Negative for abdominal pain, constipation, diarrhea, heartburn, hematochezia and nausea.   Genitourinary: Negative for dysuria, frequency, genital sores, hematuria, impotence, penile  "discharge and urgency.   Musculoskeletal: Negative for joint swelling and myalgias.   Skin: Negative for rash.   Neurological: Negative for dizziness, weakness, headaches and paresthesias.   Psychiatric/Behavioral: Negative for mood changes. The patient is not nervous/anxious.          OBJECTIVE:   /80   Pulse 83   Temp 97.6  F (36.4  C) (Tympanic)   Resp 14   Ht 1.803 m (5' 11\")   Wt 98.9 kg (218 lb)   SpO2 96%   BMI 30.40 kg/m      Physical Exam  GENERAL: healthy, alert and no distress  EYES: Eyes grossly normal to inspection, PERRL and conjunctivae and sclerae normal  HENT: ear canals and TM's normal, nose and mouth without ulcers or lesions  NECK: no adenopathy, no asymmetry, masses, or scars and thyroid normal to palpation  RESP: lungs clear to auscultation - no rales, rhonchi or wheezes  CV: regular rate and rhythm, normal S1 S2, no S3 or S4, no murmur, click or rub, no peripheral edema and peripheral pulses strong  ABDOMEN: soft, nontender, no hepatosplenomegaly, no masses and bowel sounds normal  MS: no gross musculoskeletal defects noted, no edema  SKIN: no suspicious lesions or rashes  NEURO: Normal strength and tone, mentation intact and speech normal  PSYCH: mentation appears normal, affect normal/bright    Diagnostic Test Results:  Labs reviewed in Epic    ASSESSMENT/PLAN:   1. Routine general medical examination at a health care facility  Healthy Male exam completed today.  I discussed preventative measures with the patient including continuing with lifestyle modifications of a balanced diet and regular cardiovascular exercise. I encouraged patient to follow-up yearly for annual physicals and sooner as needed.       2. Essential hypertension with goal blood pressure less than 140/90  Blood pressure is well controlled continue on losartan.  Without side effects.  - hydrochlorothiazide (HYDRODIURIL) 25 MG tablet; Take 1 tablet (25 mg) by mouth daily  Dispense: 90 tablet; Refill: 3  - " "losartan (COZAAR) 100 MG tablet; Take 1 tablet (100 mg) by mouth daily  Dispense: 90 tablet; Refill: 3  - Basic metabolic panel  (Ca, Cl, CO2, Creat, Gluc, K, Na, BUN); Future  - Basic metabolic panel  (Ca, Cl, CO2, Creat, Gluc, K, Na, BUN)    3. Hyperlipidemia LDL goal <160  Will check lipids, continue on statin  - simvastatin (ZOCOR) 40 MG tablet; Take 1 tablet (40 mg) by mouth At Bedtime  Dispense: 90 tablet; Refill: 3  - Lipid panel reflex to direct LDL Non-fasting; Future  - Lipid panel reflex to direct LDL Non-fasting    4. Chronic migraine without aura without status migrainosus, not intractable  Refill Relpax today.improving with frequency  - eletriptan (RELPAX) 40 MG tablet; Take 1 tablet by mouth. repeat after 2 hours if needed.  Dispense: 24 tablet; Refill: 11    5. Screen for colon cancer  scheduled    6. Screening for HIV (human immunodeficiency virus)  - HIV Antigen Antibody Combo; Future  - HIV Antigen Antibody Combo    7. Need for hepatitis C screening test  - Hepatitis C Screen Reflex to HCV RNA Quant and Genotype; Future  - Hepatitis C Screen Reflex to HCV RNA Quant and Genotype    8. Need for Tdap vaccination  Administer.       Patient has been advised of split billing requirements and indicates understanding: Yes      COUNSELING:   Reviewed preventive health counseling, as reflected in patient instructions       Regular exercise       Healthy diet/nutrition      BMI:   Estimated body mass index is 30.4 kg/m  as calculated from the following:    Height as of this encounter: 1.803 m (5' 11\").    Weight as of this encounter: 98.9 kg (218 lb).   Weight management plan: Discussed healthy diet and exercise guidelines      He reports that he has been smoking cigarettes. He has a 7.50 pack-year smoking history. He has quit using smokeless tobacco.  His smokeless tobacco use included chew.  Nicotine/Tobacco Cessation Plan:   Information offered: Patient not interested at this time            Tamia FINCH" KATHERINE Yeh CNP  [facility]

## 2022-12-13 LAB
HCV AB SERPL QL IA: NONREACTIVE
HIV 1+2 AB+HIV1 P24 AG SERPL QL IA: NONREACTIVE

## 2022-12-19 ENCOUNTER — TELEPHONE (OUTPATIENT)
Dept: GASTROENTEROLOGY | Facility: CLINIC | Age: 52
End: 2022-12-19

## 2022-12-19 RX ORDER — BISACODYL 5 MG
TABLET, DELAYED RELEASE (ENTERIC COATED) ORAL
Qty: 4 TABLET | Refills: 0 | Status: SHIPPED | OUTPATIENT
Start: 2022-12-19 | End: 2022-12-23

## 2022-12-19 NOTE — TELEPHONE ENCOUNTER
Screening Questions  BLUE  KIND OF PREP RED  LOCATION [review exclusion criteria] GREEN  SEDATION TYPE        Y Are you active on mychart?       FORSLUND Ordering/Referring Provider?        NA What type of coverage do you have?      N Have you had a positive covid test in the last 14 days?     30.4 1. BMI  [BMI 40+ - review exclusion criteria]    Y  2. Are you able to give consent for your medical care? [IF NO,RN REVIEW]        N  3. Are you taking any prescription pain medications on a routine schedule?      N  3a. EXTENDED PREP What kind of prescription?     N 4. Do you have any chemical dependencies such as alcohol, street drugs, or methadone?    N 5. Do you have any history of post-traumatic stress syndrome, severe anxiety or history of psychosis?      **If yes 3- 5 , please schedule with MAC sedation.**          IF YES TO ANY 6 - 10 - HOSPITAL SETTING ONLY.     N 6.   Do you need assistance transferring?     N 7.   Have you had a heart or lung transplant?    N 8.   Are you currently on dialysis?   N 9.   Do you use daily home oxygen?   N 10. Do you take nitroglycerin?   10a. N If yes, how often?     11. [FEMALES]  N Are you currently pregnant?    11a. N If yes, how many weeks? [ Greater than 12 weeks, OR NEEDED]    N 12. Do you have Pulmonary Hypertension? *NEED PAC APPT AT [facility]*     N 13. [review exclusion criteria]  Do you have any implantable devices in your body (pacemaker, defib, LVAD)?    N 14. In the past 6 months, have you had any heart related issues including cardiomyopathy or heart attack?     14a. N If yes, did it require cardiac stenting if so when?     N 15. Have you had a stroke or Transient ischemic attack (TIA - aka  mini stroke ) within 6 months?      N 16. Do you have mod to severe Obstructive Sleep Apnea?  [Hospital only - Ok at [place]]    N 17. Do you have SEVERE AND UNCONTROLLED asthma? *NEED PAC APPT AT [facility]*     N 18. Are you currently taking any blood thinners?     18a. If  "yes, inform patient to \"follow up w/ ordering provider for bridging instructions.\"    N 19. Do you take the medication Phentermine?    19a. If yes, \"Hold for 7 days before procedure.  Please consult your prescribing provider if you have questions about holding this medication.\"     N  20. Do you have chronic kidney disease?      N  21. Do you have a diagnosis of diabetes?     N  22. On a regular basis do you go 3-5 days between bowel movements?     Y 23. Preferred LOCAL Pharmacy for Pre Prescription    [ LIST ONLY ONE PHARMACY]        [facility] [address]          - CLOSING REMINDERS -    Informed patient they will need an adult    Cannot take any type of public or medical transportation alone    Conscious Sedation- Needs  for 6 hours after the procedure       MAC/General-Needs  for 24 hours after procedure    Pre-Procedure Covid test to be completed [[facility] PCR Testing Required]    Confirmed Nurse will call to complete assessment       - SCHEDULING DETAILS -  N Hospital Setting Required? If yes, what is the exclusion?: N   CAIN  Surgeon    12/23/22  Date of Procedure  Lower Endoscopy [Colonoscopy]  Type of Procedure Scheduled  [facility]-If you answer yes to questions #8, #20, #21Which Colonoscopy Prep was Sent?     MAC Sedation Type     N PAC / Pre-op Required                 "

## 2022-12-21 ENCOUNTER — ANESTHESIA EVENT (OUTPATIENT)
Dept: GASTROENTEROLOGY | Facility: CLINIC | Age: 52
End: 2022-12-21
Payer: COMMERCIAL

## 2022-12-21 ASSESSMENT — LIFESTYLE VARIABLES: TOBACCO_USE: 1

## 2022-12-21 NOTE — ANESTHESIA PREPROCEDURE EVALUATION
Anesthesia Pre-Procedure Evaluation    Patient: Jossue Torres   MRN: [id number] : 1970        Procedure : Procedure(s):  COLONOSCOPY          Past Medical History:   Diagnosis Date     Herpes zoster without mention of complication 1991     Hypertension       Past Surgical History:   Procedure Laterality Date     CRANIOTOMY, DECOMPRESS NEUROVASCULAR, COMBINED Right 2022    Procedure: Right microvascular decompression  Latex Free;  Surgeon: Grey Simeon MD;  Location: [facility] OR     HERNIA REPAIR  Baby     IRRIGATION AND DEBRIDEMENT CRANIUM Right 2022    Procedure: Retro-Mastoid wound washout;  Surgeon: Grey Simeon MD;  Location: [facility] OR     PICC INSERTION - DOUBLE LUMEN Right 05/10/2022    right basilic 5 fr dl picc 44 cm     SURGICAL HISTORY OF -   1969    right inguinal hernioplasty     SURGICAL HISTORY OF -   1993    lysis of two penile adhesions and cauterization of penile condyloma.      Allergies   Allergen Reactions     Nkda [No Known Drug Allergies]       Social History     Tobacco Use     Smoking status: Every Day     Packs/day: 0.50     Years: 15.00     Pack years: 7.50     Types: Cigarettes     Smokeless tobacco: Former     Types: Chew   Substance Use Topics     Alcohol use: Yes     Comment: rare      Wt Readings from Last 1 Encounters:   22 98.9 kg (218 lb)        Anesthesia Evaluation   Pt has had prior anesthetic. Type: General and MAC.    No history of anesthetic complications       ROS/MED HX  ENT/Pulmonary:     (+) tobacco use, Current use,     Neurologic: Comment: Trigeminal neuralgia  S/p craniotomy    (+) migraines,     Cardiovascular:     (+) Dyslipidemia hypertension-----Previous cardiac testing   Echo: Date: Results:    Stress Test: Date: Results:    ECG Reviewed: Date: 22 Results:  Sinus rhythm   Normal ECG   Cath: Date: Results:      METS/Exercise Tolerance:     Hematologic:  - neg hematologic  ROS     Musculoskeletal:  - neg  musculoskeletal ROS     GI/Hepatic:     (+) GERD,     Renal/Genitourinary:  - neg Renal ROS     Endo:     (+) Obesity,     Psychiatric/Substance Use:     (+) psychiatric history anxiety     Infectious Disease:  - neg infectious disease ROS     Malignancy:  - neg malignancy ROS     Other:  - neg other ROS          Physical Exam    Airway        Mallampati: II   TM distance: > 3 FB   Neck ROM: full   Mouth opening: > 3 cm    Respiratory Devices and Support  Comment: Not on oxygen currently       Dental  no notable dental history         Cardiovascular   cardiovascular exam normal          Pulmonary   pulmonary exam normal                OUTSIDE LABS:  CBC:   Lab Results   Component Value Date    WBC 7.1 05/31/2022    WBC 7.5 05/18/2022    HGB 15.2 05/31/2022    HGB 15.9 05/18/2022    HCT 44.7 05/31/2022    HCT 48.4 05/18/2022     05/31/2022     05/18/2022     BMP:   Lab Results   Component Value Date     12/12/2022     05/31/2022    POTASSIUM 3.8 12/12/2022    POTASSIUM 3.7 05/31/2022    CHLORIDE 100 12/12/2022    CHLORIDE 105 05/31/2022    CO2 29 12/12/2022    CO2 28 05/31/2022    BUN 16.5 12/12/2022    BUN 13 05/31/2022    CR 1.31 (H) 12/12/2022    CR 1.47 (H) 05/31/2022    GLC 90 12/12/2022     (H) 05/31/2022     COAGS:   Lab Results   Component Value Date    PTT 28 05/06/2022    INR 0.94 05/06/2022     POC: No results found for: BGM, HCG, HCGS  HEPATIC:   Lab Results   Component Value Date    ALBUMIN 3.9 05/31/2022    PROTTOTAL 7.3 05/31/2022    ALT 51 05/31/2022    AST 22 05/31/2022    ALKPHOS 80 05/31/2022    BILITOTAL 0.6 05/31/2022     OTHER:   Lab Results   Component Value Date    ANN MARIE 9.3 12/12/2022    PHOS 3.0 05/11/2022    MAG 2.0 05/11/2022    LIPASE 75 12/13/2011    CRP <2.9 05/31/2022    SED 13 05/11/2022       Anesthesia Plan    ASA Status:  3   NPO Status:  NPO Appropriate    Anesthesia Type: General.   Induction: Intravenous, Propofol.   Maintenance: TIVA.         Consents    Anesthesia Plan(s) and associated risks, benefits, and realistic alternatives discussed. Questions answered and patient/representative(s) expressed understanding.     - Discussed: Risks, Benefits and Alternatives for BOTH SEDATION and the PROCEDURE were discussed     - Discussed with:  Patient         Postoperative Care            Comments:    Other Comments: Patient aware of plan, what to expect, and potential risks. Timeout was performed before bringing the patient back to OR, and once again, patient was asked if they had any questions            Ben Lopez CRNA, APRN CRNA

## 2022-12-23 ENCOUNTER — HOSPITAL ENCOUNTER (OUTPATIENT)
Facility: CLINIC | Age: 52
Discharge: HOME | End: 2022-12-23
Attending: SURGERY | Admitting: SURGERY
Payer: COMMERCIAL

## 2022-12-23 ENCOUNTER — ANESTHESIA (OUTPATIENT)
Dept: GASTROENTEROLOGY | Facility: CLINIC | Age: 52
End: 2022-12-23
Payer: COMMERCIAL

## 2022-12-23 VITALS
BODY MASS INDEX: 30.52 KG/M2 | OXYGEN SATURATION: 99 % | HEART RATE: 88 BPM | DIASTOLIC BLOOD PRESSURE: 75 MMHG | RESPIRATION RATE: 16 BRPM | WEIGHT: 218 LBS | TEMPERATURE: 98.7 F | SYSTOLIC BLOOD PRESSURE: 91 MMHG | HEIGHT: 71 IN

## 2022-12-23 DIAGNOSIS — Z12.11 SPECIAL SCREENING FOR MALIGNANT NEOPLASMS, COLON: Primary | ICD-10-CM

## 2022-12-23 LAB — COLONOSCOPY: NORMAL

## 2022-12-23 PROCEDURE — 88305 TISSUE EXAM BY PATHOLOGIST: CPT | Mod: TC | Performed by: SURGERY

## 2022-12-23 PROCEDURE — 88305 TISSUE EXAM BY PATHOLOGIST: CPT | Mod: 26 | Performed by: PATHOLOGY

## 2022-12-23 PROCEDURE — 250N000011 HC RX IP 250 OP 636

## 2022-12-23 PROCEDURE — 370N000017 HC ANESTHESIA TECHNICAL FEE, PER MIN: Performed by: SURGERY

## 2022-12-23 PROCEDURE — 45385 COLONOSCOPY W/LESION REMOVAL: CPT | Mod: PT | Performed by: SURGERY

## 2022-12-23 PROCEDURE — 45385 COLONOSCOPY W/LESION REMOVAL: CPT | Performed by: SURGERY

## 2022-12-23 PROCEDURE — 258N000003 HC RX IP 258 OP 636: Performed by: SURGERY

## 2022-12-23 RX ORDER — ONDANSETRON 2 MG/ML
4 INJECTION INTRAMUSCULAR; INTRAVENOUS EVERY 30 MIN PRN
Status: DISCONTINUED | OUTPATIENT
Start: 2022-12-23 | End: 2022-12-23 | Stop reason: HOSPADM

## 2022-12-23 RX ORDER — PROPOFOL 10 MG/ML
INJECTION, EMULSION INTRAVENOUS CONTINUOUS PRN
Status: DISCONTINUED | OUTPATIENT
Start: 2022-12-23 | End: 2022-12-23

## 2022-12-23 RX ORDER — ONDANSETRON 4 MG/1
4 TABLET, ORALLY DISINTEGRATING ORAL EVERY 30 MIN PRN
Status: DISCONTINUED | OUTPATIENT
Start: 2022-12-23 | End: 2022-12-23 | Stop reason: HOSPADM

## 2022-12-23 RX ORDER — SODIUM CHLORIDE, SODIUM LACTATE, POTASSIUM CHLORIDE, CALCIUM CHLORIDE 600; 310; 30; 20 MG/100ML; MG/100ML; MG/100ML; MG/100ML
INJECTION, SOLUTION INTRAVENOUS CONTINUOUS
Status: DISCONTINUED | OUTPATIENT
Start: 2022-12-23 | End: 2022-12-23 | Stop reason: HOSPADM

## 2022-12-23 RX ORDER — LIDOCAINE 40 MG/G
CREAM TOPICAL
Status: DISCONTINUED | OUTPATIENT
Start: 2022-12-23 | End: 2022-12-23 | Stop reason: HOSPADM

## 2022-12-23 RX ADMIN — SODIUM CHLORIDE, POTASSIUM CHLORIDE, SODIUM LACTATE AND CALCIUM CHLORIDE: 600; 310; 30; 20 INJECTION, SOLUTION INTRAVENOUS at 10:21

## 2022-12-23 RX ADMIN — PROPOFOL 150 MCG/KG/MIN: 10 INJECTION, EMULSION INTRAVENOUS at 10:24

## 2022-12-23 ASSESSMENT — ACTIVITIES OF DAILY LIVING (ADL): ADLS_ACUITY_SCORE: 35

## 2022-12-23 NOTE — LETTER
Jossue Torres  [address] [id number]    December 27, 2022    Dear Jossue,  This letter is written to inform you of the results of your recent colonoscopy.  Your examination showed polyp(s) in your ascending colon, transverse colon and descending colon. All polyps were removed in their entirety and sent for review by a pathologist. As you will see on the pathology report below, the tissue(s) were tubular adenomatous polyps. Your examination was otherwise without abnormality.  Final Diagnosis   A(1). Colon, Transverse, polyps, polypectomy:  -Sessile serrated adenoma  -Negative for conventional dysplasia and malignancy     -Separate tubular adenoma  -Negative for high-grade dysplasia and malignancy.     B(2).   Colon, Ascending, polyp, polypectomy:  -Tubular adenoma  -Negative for high-grade dysplasia and malignancy.     C(3). Colon, Descending, polyp, polypectomy:  -Tubular adenoma  -Negative for high-grade dysplasia and malignancy.         Adenomatous polyps are entirely benign (non-cancerous); however, patients who have developed these polyps are at an increased risk for developing additional polyps in the future. If these are not eventually removed, there is a risk of developing colon cancer. We will advise more frequent examinations with you because of the risk associated with this type of polyp.    Given these findings,  I recommend that you undergo a repeat colonoscopy in 3 year(s) for surveillance. We will enter you into a recall system so you receive a reminder closer to the time that you are due for repeat examination.     Please remember that this recommendation is made with the understanding that you are not experiencing persistent changes in bowel function, bleeding per rectum, and/or significant abdominal pain. If you experience these symptoms, please contact your primary care provider for a further evaluation.     If you have any questions or concerns about the results of your  colonoscopy or the appropriate follow-up, please contact my assistant at [phone].    Sincerely,        [facility],   [facility]  ___

## 2022-12-23 NOTE — ANESTHESIA CARE TRANSFER NOTE
Patient: Jossue Torres    Procedure: Procedure(s):  COLONOSCOPY, FLEXIBLE, WITH LESION REMOVAL USING SNARE       Diagnosis: Colon cancer screening [Z12.11]  Diagnosis Additional Information: No value filed.    Anesthesia Type:   General     Note:    Oropharynx: oropharynx clear of all foreign objects  Level of Consciousness: awake      Independent Airway: airway patency satisfactory and stable  Dentition: dentition unchanged  Vital Signs Stable: post-procedure vital signs reviewed and stable  Report to RN Given: handoff report given  Patient transferred to: Phase II    Handoff Report: Identifed the Patient, Identified the Reponsible Provider, Reviewed the pertinent medical history, Discussed the surgical course, Reviewed Intra-OP anesthesia mangement and issues during anesthesia, Set expectations for post-procedure period and Allowed opportunity for questions and acknowledgement of understanding      Vitals:  Vitals Value Taken Time   BP 97/64 12/23/22 1100   Temp     Pulse 74 12/23/22 1100   Resp     SpO2 98 % 12/23/22 1102   Vitals shown include unvalidated device data.    Electronically Signed By: KATHERINE García CRNA  December 23, 2022  11:03 AM

## 2022-12-23 NOTE — ADDENDUM NOTE
Addendum  created 12/23/22 1104 by Mathew Hopkins APRN CRNA    Intraprocedure Flowsheets edited, Intraprocedure Meds edited

## 2022-12-23 NOTE — ANESTHESIA POSTPROCEDURE EVALUATION
Patient: Jossue Torres    Procedure: Procedure(s):  COLONOSCOPY, FLEXIBLE, WITH LESION REMOVAL USING SNARE       Anesthesia Type:  General    Note:  Disposition: Outpatient   Postop Pain Control: Uneventful            Sign Out: Well controlled pain   PONV: No   Neuro/Psych: Uneventful            Sign Out: Acceptable/Baseline neuro status   Airway/Respiratory: Uneventful            Sign Out: Acceptable/Baseline resp. status   CV/Hemodynamics: Uneventful            Sign Out: Acceptable CV status; No obvious hypovolemia; No obvious fluid overload   Other NRE:    DID A NON-ROUTINE EVENT OCCUR?            Last vitals:  Vitals Value Taken Time   BP 97/64 12/23/22 1100   Temp     Pulse 74 12/23/22 1100   Resp     SpO2 98 % 12/23/22 1102   Vitals shown include unvalidated device data.    Electronically Signed By: KATHERINE García CRNA  December 23, 2022  11:03 AM

## 2022-12-23 NOTE — INTERVAL H&P NOTE
"I have reviewed the surgical (or preoperative) H&P that is linked to this encounter, and examined the patient. There are no significant changes    1st screening; no famhx of colon cancer; no blood thinner    Clinical Conditions Present on Arrival:  Clinically Significant Risk Factors Present on Admission                    # Obesity: Estimated body mass index is 30.4 kg/m  as calculated from the following:    Height as of this encounter: 1.803 m (5' 11\").    Weight as of this encounter: 98.9 kg (218 lb).       "

## 2022-12-26 LAB
PATH REPORT.COMMENTS IMP SPEC: NORMAL
PATH REPORT.COMMENTS IMP SPEC: NORMAL
PATH REPORT.FINAL DX SPEC: NORMAL
PATH REPORT.GROSS SPEC: NORMAL
PATH REPORT.MICROSCOPIC SPEC OTHER STN: NORMAL
PATH REPORT.RELEVANT HX SPEC: NORMAL
PHOTO IMAGE: NORMAL

## 2023-01-10 DIAGNOSIS — G50.0 TRIGEMINAL NEURALGIA: ICD-10-CM

## 2023-01-10 RX ORDER — GABAPENTIN 300 MG/1
300 CAPSULE ORAL 3 TIMES DAILY
Qty: 90 CAPSULE | Refills: 1 | Status: SHIPPED | OUTPATIENT
Start: 2023-01-10 | End: 2023-12-15

## 2023-03-20 DIAGNOSIS — G50.0 TRIGEMINAL NEURALGIA: ICD-10-CM

## 2023-03-24 RX ORDER — GABAPENTIN 300 MG/1
300 CAPSULE ORAL 3 TIMES DAILY
Qty: 90 CAPSULE | Refills: 1 | OUTPATIENT
Start: 2023-03-24

## 2023-03-27 DIAGNOSIS — G50.0 TRIGEMINAL NEURALGIA: ICD-10-CM

## 2023-03-30 RX ORDER — GABAPENTIN 300 MG/1
300 CAPSULE ORAL 3 TIMES DAILY
Qty: 90 CAPSULE | Refills: 1 | OUTPATIENT
Start: 2023-03-30

## 2023-05-15 ENCOUNTER — TELEPHONE (OUTPATIENT)
Dept: FAMILY MEDICINE | Facility: CLINIC | Age: 53
End: 2023-05-15
Payer: COMMERCIAL

## 2023-06-15 NOTE — PROGRESS NOTES
This is a recent snapshot of the patient's [facility] medical record.  For current drug dose and complete information and questions, call [phone] or In Basket pool, [facility] home infusion ([id number])  CSN Number:  [id number]

## 2023-07-04 ASSESSMENT — HEADACHE IMPACT TEST (HIT 6)
HOW OFTEN DID HEADACHS LIMIT CONCENTRATION ON WORK OR DAILY ACTIVITY: RARELY
WHEN YOU HAVE A HEADACHE HOW OFTEN DO YOU WISH YOU COULD LIE DOWN: SOMETIMES
HIT6 TOTAL SCORE: 55
WHEN YOU HAVE HEADACHES HOW OFTEN IS THE PAIN SEVERE: VERY OFTEN
HOW OFTEN HAVE YOU FELT TOO TIRED TO WORK BECAUSE OF YOUR HEADACHES: RARELY
HOW OFTEN HAVE YOU FELT FED UP OR IRRITATED BECAUSE OF YOUR HEADACHES: RARELY
HOW OFTEN DO HEADACHES LIMIT YOUR DAILY ACTIVITIES: SOMETIMES

## 2023-07-05 ENCOUNTER — VIRTUAL VISIT (OUTPATIENT)
Dept: NEUROLOGY | Facility: CLINIC | Age: 53
End: 2023-07-05
Payer: COMMERCIAL

## 2023-07-05 DIAGNOSIS — G43.709 CHRONIC MIGRAINE WITHOUT AURA WITHOUT STATUS MIGRAINOSUS, NOT INTRACTABLE: Primary | ICD-10-CM

## 2023-07-05 DIAGNOSIS — G43.719 INTRACTABLE CHRONIC MIGRAINE WITHOUT AURA AND WITHOUT STATUS MIGRAINOSUS: ICD-10-CM

## 2023-07-05 PROCEDURE — 99212 OFFICE O/P EST SF 10 MIN: CPT | Mod: 95 | Performed by: NURSE PRACTITIONER

## 2023-07-05 NOTE — NURSING NOTE
Is the patient currently in the state of [place]? YES    Visit mode:VIDEO    If the visit is dropped, the patient can be reconnected by: TELEPHONE VISIT: Phone number: [phone]    Will anyone else be joining the visit? NO      How would you like to obtain your AVS? MyChart    Are changes needed to the allergy or medication list? NO    Reason for visit: Video Visit (9 month follow-up )

## 2023-07-05 NOTE — PROGRESS NOTES
"Elmo is a 52 year old who is being evaluated via a billable video visit.        Subjective   Elmo is a 52 year old, presenting for the following health issues: headache treatment   Video Visit (9 month follow-up )  Last Headache Clinic visit 10/13/2022, see note for details  Has been doing good-life is good. Gets migraine 4-5/month and a lot better than it was. Emgality helped tremendously and no side effects except soreness in injection.. Headache reduction at least 50 % and closer to 70%.   Takes eltriptan as needed and helps within an hour.   No new concerns.     Plan:  Continue Emgality for migraine prevention   Rescue treatment -eletriptan as needed  Follow up in 9-12 months if stable or sooner if needed       Objective    Vitals - Patient Reported  Weight (Patient Reported): 95.3 kg (210 lb)  Height (Patient Reported): 180.3 cm (5' 11\")  BMI (Based on Pt Reported Ht/Wt): 29.29  Pain Score: No Pain (0)        Physical Exam   Patient is alert and no in apparent acute distress,  mentation appears normal, judgement and insight intact, normal speech.    I discussed all my recommendations with Jossue Torres who verbalizes understanding and comfortable with the plan.      11 minutes spent on the date of the encounter doing video access, chart  review,  meds review, treatment plan, documentation and further activities as noted above    KATHERINE Castellanos, CNP [facility]  Headache certified  [facility]      Video-Visit Details    Type of service:  Video Visit   Originating Location (pt. Location): Home  Distant Location (provider location):  Off-site  Platform used for Video Visit: Gala"

## 2023-07-05 NOTE — LETTER
"7/5/2023       RE: Jossue Torres  [address] [address] [address]       Dear Colleague,    Thank you for referring your patient, Jossue Torres, to the [facility] [place] at [facility]. Please see a copy of my visit note below.    Elmo is a 52 year old who is being evaluated via a billable video visit.        Subjective   Elmo is a 52 year old, presenting for the following health issues: headache treatment   Video Visit (9 month follow-up )  Last Headache Clinic visit 10/13/2022, see note for details  Has been doing good-life is good. Gets migraine 4-5/month and a lot better than it was. Emgality helped tremendously and no side effects except soreness in injection.. Headache reduction at least 50 % and closer to 70%.   Takes eltriptan as needed and helps within an hour.   No new concerns.     Plan:  Continue Emgality for migraine prevention   Rescue treatment -eletriptan as needed  Follow up in 9-12 months if stable or sooner if needed       Objective    Vitals - Patient Reported  Weight (Patient Reported): 95.3 kg (210 lb)  Height (Patient Reported): 180.3 cm (5' 11\")  BMI (Based on Pt Reported Ht/Wt): 29.29  Pain Score: No Pain (0)        Physical Exam   Patient is alert and no in apparent acute distress,  mentation appears normal, judgement and insight intact, normal speech.    I discussed all my recommendations with Jossue Torres who verbalizes understanding and comfortable with the plan.      11 minutes spent on the date of the encounter doing video access, chart  review,  meds review, treatment plan, documentation and further activities as noted above          Again, thank you for allowing me to participate in the care of your patient.      Sincerely,    KATHERINE Keating CNP      "

## 2023-07-06 ENCOUNTER — TELEPHONE (OUTPATIENT)
Dept: NEUROLOGY | Facility: CLINIC | Age: 53
End: 2023-07-06
Payer: COMMERCIAL

## 2023-07-06 NOTE — TELEPHONE ENCOUNTER
Prior Authorization Retail Medication Request    Medication/Dose: galcanezumab-gnlm (EMGALITY) 120 MG/ML injection  ICD code (if different than what is on RX):  G43.709  Previously Tried and Failed:   Gabapentin -dizziness/lighheadiness  Oxcarbazepine   Currently taking Losartan+diuretic   Rationale:  Chronic migraine  Has been on Emgality and it is working needs PA renewal.     Insurance Name:  [facility]  Insurance ID:  [id number]      Pharmacy Information (if different than what is on RX)  Name:    Phone:

## 2023-07-11 NOTE — TELEPHONE ENCOUNTER
Central Prior Authorization Team   Phone: [phone]    PA Initiation    Medication: galcanezumab-gnlm (EMGALITY) 120 MG/ML injection  Insurance Company: [employer] - Phone [phone] Fax [phone]  Pharmacy Filling the Rx: [facility] - [place], [place] - [address] [facility]  Filling Pharmacy Phone: [phone]  Filling Pharmacy Fax:    Start Date: 7/11/2023

## 2023-07-17 NOTE — TELEPHONE ENCOUNTER
Prior Authorization Not Needed per Insurance    Medication: galcanezumab-gnlm (EMGALITY) 120 MG/ML injection-PA NOT NEEDED   Insurance Company: [employer] - Phone [phone] Fax [phone]  Expected CoPay:      [facility]illing the Rx[facility] - [facility] [address] [facility]  Pharmacy Notified: Yes  Patient Notified: No      Called pharmacy and pharmacy stated that PA is Not Needed and medication is covered. Pharmacy stated that they have a paid claim on medication on 7/14/2023 quantity 1 ml per 28 days through patients insurance and patient has picked up medication. Insurance also stated that PA is Not Needed and medication is covered.

## 2023-11-16 ENCOUNTER — MYC REFILL (OUTPATIENT)
Dept: NEUROLOGY | Facility: CLINIC | Age: 53
End: 2023-11-16
Payer: COMMERCIAL

## 2023-11-16 ENCOUNTER — TELEPHONE (OUTPATIENT)
Dept: NEUROLOGY | Facility: CLINIC | Age: 53
End: 2023-11-16
Payer: COMMERCIAL

## 2023-11-16 DIAGNOSIS — G43.709 CHRONIC MIGRAINE WITHOUT AURA WITHOUT STATUS MIGRAINOSUS, NOT INTRACTABLE: ICD-10-CM

## 2023-11-16 NOTE — TELEPHONE ENCOUNTER
Prior Authorization Retail Medication Request     Medication/Dose: galcanezumab-gnlm (EMGALITY) 120 MG/ML injection  ICD code (if different than what is on RX):  G43.709  Previously Tried and Failed:   Gabapentin -dizziness/lighheadiness  Oxcarbazepine   Currently taking Losartan+diuretic   Rationale:  Chronic migraine  Has been on Emgality and it is working needs PA renewal.      Insurance Name:  [facility]  Insurance ID:  [id number]        Pharmacy Information (if different than what is on RX)  Name:  [facility]  Phone:

## 2023-11-17 ENCOUNTER — TELEPHONE (OUTPATIENT)
Dept: NEUROLOGY | Facility: CLINIC | Age: 53
End: 2023-11-17
Payer: COMMERCIAL

## 2023-11-17 NOTE — TELEPHONE ENCOUNTER
Prior Authorization Retail Medication Request     Medication/Dose: galcanezumab-gnlm (EMGALITY) 120 MG/ML injection  ICD code (if different than what is on RX):  G43.709  Previously Tried and Failed:   Gabapentin -dizziness/lighheadiness  Oxcarbazepine   Currently taking Losartan+diuretic   Rationale:  Chronic migraine  Has been on Emgality and it is working needs PA renewal.      Insurance Name:  [facility]  Insurance ID:  [id number]        Pharmacy Information (if different than what is on RX)  Name:    Phone:

## 2023-11-22 NOTE — TELEPHONE ENCOUNTER
Prior Authorization Approval    Medication: EMGALITY 120 MG/ML SC SOAJ  Authorization Effective Date: 11/22/2023  Authorization Expiration Date: 11/22/2024  Approved Dose/Quantity:   Reference #:     Insurance Company: [employer] - Phone [phone] Fax [phone]  Expected CoPay: $    CoPay Card Available:      Financial Assistance Needed:   Which Pharmacy is filling the prescription: [facility] - [facility] [address] [facility]  Pharmacy Notified: Yes  Patient Notified: **Instructed pharmacy to notify patient when script is ready to /ship.**

## 2023-11-22 NOTE — TELEPHONE ENCOUNTER
Central Prior Authorization Team   Phone: [phone]    PA Initiation    Medication: EMGALITY 120 MG/ML SC SOAJ  Insurance Company: [employer] - Phone [phone] Fax [phone]  Pharmacy Filling the Rx: [facility] - [place] - [address] [facility]  Filling Pharmacy Phone: [phone]  Filling Pharmacy Fax:    Start Date: 11/22/2023

## 2023-12-13 ASSESSMENT — ENCOUNTER SYMPTOMS
NAUSEA: 0
ABDOMINAL PAIN: 0
SORE THROAT: 0
FEVER: 0
ARTHRALGIAS: 0
EYE PAIN: 0
DIZZINESS: 0
HEADACHES: 1
DIARRHEA: 0
SHORTNESS OF BREATH: 0
CONSTIPATION: 0
PARESTHESIAS: 0
DYSURIA: 0
HEMATURIA: 0
COUGH: 0
NERVOUS/ANXIOUS: 0
CHILLS: 0
WEAKNESS: 0
HEMATOCHEZIA: 0
FREQUENCY: 0
JOINT SWELLING: 0
PALPITATIONS: 0
HEARTBURN: 0
MYALGIAS: 0

## 2023-12-13 NOTE — COMMUNITY RESOURCES LIST (ENGLISH)
12/13/2023    [facility] Spotlight  N/A  For questions about this resource list or additional care needs, please contact your primary care clinic or care manager.  Phone: [phone]   Email: N/A   Address: [address] [place] [address]   Hours: N/A        Financial Stability       Utility payment assistance  1  Community Helping Hand Distance: 11.13 miles      In-Person, Phone/Virtual   [address] [place] [address]  Language: English  Hours: Mon - Sun Appt. Only  Fees: Free   Phone: (561) 513-5917 Email: balcristaarabella@[employer] Website: http://www.[employer].[facility]     2  [facility] Action [place] ([facility]) [facility] Office - Energy Assistance Program Distance: 11.91 miles      Phone/Virtual   [address] [place] [address]  Language: English  Hours: Mon - Fri 8:00 AM - 4:30 PM  Fees: Free   Phone: (371) 136-2653 Email: thomas@[employer] Website: https://www.[facility].org/agency-information          Important Numbers & Websites       Emergency Services   911  [facility] Services   311  Poison Control   (721) 610-9740  Suicide Prevention Lifeline   (857) 978-8388 (TALK)  Child Abuse Hotline   (868) 411-6623 (4-A-Child)  Sexual Assault Hotline   (519) 238-4944 (HOPE)  National Runaway Safeline   (447) 249-9951 (RUNAWAY)  All-Options Talkline   (552) 807-8344  Substance Abuse Referral   (128) 179-8586 (HELP)

## 2023-12-15 ENCOUNTER — LAB (OUTPATIENT)
Dept: LAB | Facility: CLINIC | Age: 53
End: 2023-12-15
Payer: COMMERCIAL

## 2023-12-15 ENCOUNTER — OFFICE VISIT (OUTPATIENT)
Dept: FAMILY MEDICINE | Facility: CLINIC | Age: 53
End: 2023-12-15
Payer: COMMERCIAL

## 2023-12-15 VITALS
BODY MASS INDEX: 28.56 KG/M2 | OXYGEN SATURATION: 99 % | DIASTOLIC BLOOD PRESSURE: 84 MMHG | HEIGHT: 71 IN | WEIGHT: 204 LBS | SYSTOLIC BLOOD PRESSURE: 132 MMHG | RESPIRATION RATE: 16 BRPM | TEMPERATURE: 97.8 F | HEART RATE: 79 BPM

## 2023-12-15 DIAGNOSIS — I10 ESSENTIAL HYPERTENSION WITH GOAL BLOOD PRESSURE LESS THAN 140/90: ICD-10-CM

## 2023-12-15 DIAGNOSIS — Z12.5 SCREENING FOR PROSTATE CANCER: ICD-10-CM

## 2023-12-15 DIAGNOSIS — G43.709 CHRONIC MIGRAINE WITHOUT AURA WITHOUT STATUS MIGRAINOSUS, NOT INTRACTABLE: ICD-10-CM

## 2023-12-15 DIAGNOSIS — E78.5 HYPERLIPIDEMIA LDL GOAL <160: ICD-10-CM

## 2023-12-15 DIAGNOSIS — Z00.00 ROUTINE GENERAL MEDICAL EXAMINATION AT A HEALTH CARE FACILITY: Primary | ICD-10-CM

## 2023-12-15 LAB
ANION GAP SERPL CALCULATED.3IONS-SCNC: 14 MMOL/L (ref 7–15)
BUN SERPL-MCNC: 10.5 MG/DL (ref 6–20)
CALCIUM SERPL-MCNC: 9.4 MG/DL (ref 8.6–10)
CHLORIDE SERPL-SCNC: 103 MMOL/L (ref 98–107)
CHOLEST SERPL-MCNC: 169 MG/DL
CREAT SERPL-MCNC: 1.27 MG/DL (ref 0.67–1.17)
DEPRECATED HCO3 PLAS-SCNC: 24 MMOL/L (ref 22–29)
EGFRCR SERPLBLD CKD-EPI 2021: 67 ML/MIN/1.73M2
FASTING STATUS PATIENT QL REPORTED: NO
GLUCOSE SERPL-MCNC: 84 MG/DL (ref 70–99)
HDLC SERPL-MCNC: 47 MG/DL
LDLC SERPL CALC-MCNC: 96 MG/DL
NONHDLC SERPL-MCNC: 122 MG/DL
POTASSIUM SERPL-SCNC: 4.3 MMOL/L (ref 3.4–5.3)
PSA SERPL DL<=0.01 NG/ML-MCNC: 1.13 NG/ML (ref 0–3.5)
SODIUM SERPL-SCNC: 141 MMOL/L (ref 135–145)
TRIGL SERPL-MCNC: 132 MG/DL

## 2023-12-15 PROCEDURE — G0103 PSA SCREENING: HCPCS

## 2023-12-15 PROCEDURE — 99214 OFFICE O/P EST MOD 30 MIN: CPT | Mod: 25 | Performed by: NURSE PRACTITIONER

## 2023-12-15 PROCEDURE — 80048 BASIC METABOLIC PNL TOTAL CA: CPT

## 2023-12-15 PROCEDURE — 80061 LIPID PANEL: CPT

## 2023-12-15 PROCEDURE — 36415 COLL VENOUS BLD VENIPUNCTURE: CPT

## 2023-12-15 PROCEDURE — 99396 PREV VISIT EST AGE 40-64: CPT | Performed by: NURSE PRACTITIONER

## 2023-12-15 RX ORDER — ELETRIPTAN HYDROBROMIDE 40 MG/1
TABLET, FILM COATED ORAL
Qty: 24 TABLET | Refills: 11 | Status: SHIPPED | OUTPATIENT
Start: 2023-12-15

## 2023-12-15 RX ORDER — HYDROCHLOROTHIAZIDE 25 MG/1
25 TABLET ORAL DAILY
Qty: 90 TABLET | Refills: 3 | Status: SHIPPED | OUTPATIENT
Start: 2023-12-15

## 2023-12-15 RX ORDER — LOSARTAN POTASSIUM 100 MG/1
100 TABLET ORAL DAILY
Qty: 90 TABLET | Refills: 3 | Status: SHIPPED | OUTPATIENT
Start: 2023-12-15

## 2023-12-15 RX ORDER — SIMVASTATIN 40 MG
40 TABLET ORAL AT BEDTIME
Qty: 90 TABLET | Refills: 3 | Status: SHIPPED | OUTPATIENT
Start: 2023-12-15

## 2023-12-15 ASSESSMENT — ENCOUNTER SYMPTOMS
CONSTIPATION: 0
FREQUENCY: 0
NAUSEA: 0
EYE PAIN: 0
MYALGIAS: 0
SORE THROAT: 0
COUGH: 0
WEAKNESS: 0
FEVER: 0
NERVOUS/ANXIOUS: 0
HEMATURIA: 0
DIZZINESS: 0
SHORTNESS OF BREATH: 0
PALPITATIONS: 0
HEARTBURN: 0
ABDOMINAL PAIN: 0
HEMATOCHEZIA: 0
ARTHRALGIAS: 0
JOINT SWELLING: 0
HEADACHES: 1
DIARRHEA: 0
CHILLS: 0
PARESTHESIAS: 0
DYSURIA: 0

## 2023-12-15 ASSESSMENT — PAIN SCALES - GENERAL: PAINLEVEL: NO PAIN (0)

## 2023-12-15 NOTE — COMMUNITY RESOURCES LIST (ENGLISH)
12/15/2023   [facility] - Outpatient Clinics  N/A  For additional resource needs, please contact your health insurance member services or your primary care team.  Phone: [phone]   Email: N/A   Address: [facility] [place] [address]   Hours: N/A        Financial Stability       Utility payment assistance  1  [place] KARALIT Commission - [place]'s Telephone Assistance Plan (TAP) and Federal Lifeline and Affordable Connectivity Program (ACP) Distance: 29.23 miles      Phone/Virtual   [address] [place] [address]  Language: English  Fees: Free   Phone: (627) 483-3021 Email: consumer.puc@[facility].[place]. Website: https://[place].gov/puc/consumers/telephone/     2  [place] NONO - Energy and Utilities Distance: 32.7 miles      In-Person, Phone/Virtual   [address] [place] [address]  Language: English  Hours: Mon - Fri 8:30 AM - 4:30 PM  Fees: Free   Phone: (914) 404-6169 Website: https://[place]/DropMate/energy/consumer-assistance/energy-assistance-program/          Important Numbers & Websites       [facility]   211 211itedway.org  Poison Control   (235) 304-1568 [place]poison.[place]  Suicide and Crisis Lifeline   988 8[facility] System[facility].org  Childhelp [place] Child Abuse Hotline   552.452.4672 Childhelphotline.org  National Sexual Assault Hotline   (241) 582-3508 (HOPE) Rainn.org  National Runaway Safeline   (234) 665-5191 (RUNAWAY) 1800runaway.org  Pregnancy & Postpartum Support [place]   Call/text 233-179-0510 Ppsupportmn.org  Substance Abuse National Helpline ([facility]   921-791-HELP (2630) Findtreatment.gov  Emergency Services   911

## 2023-12-15 NOTE — PROGRESS NOTES
"SUBJECTIVE:   Elmo is a 53 year old, presenting for the following:  Physical        12/15/2023     2:59 PM   Additional Questions   Roomed by Deborah DOWNS MA   Accompanied by Self       Healthy Habits:     Getting at least 3 servings of Calcium per day:  NO    Bi-annual eye exam:  NO    Dental care twice a year:  Yes    Sleep apnea or symptoms of sleep apnea:  None    Diet:  Regular (no restrictions)    Frequency of exercise:  1 day/week    Duration of exercise:  Less than 15 minutes    Taking medications regularly:  Yes    Barriers to taking medications:  None    Medication side effects:  None    Additional concerns today:  No    Follows with neurology for chronic migraines. He is down to 5 migraines per month. He is on emgality for treatment of this as well and this is going well.     Hyperlipidemia Follow-Up  Are you regularly taking any medication or supplement to lower your cholesterol?   Yes- Simvastatin  Are you having muscle aches or other side effects that you think could be caused by your cholesterol lowering medication?  No    Hypertension Follow-up  Do you check your blood pressure regularly outside of the clinic? Yes   Are you following a low salt diet? Yes  Are your blood pressures ever more than 140 on the top number (systolic) OR more   than 90 on the bottom number (diastolic), for example 140/90? No      Social History     Tobacco Use    Smoking status: Every Day     Packs/day: 0.50     Years: 15.00     Additional pack years: 0.00     Total pack years: 7.50     Types: Cigarettes    Smokeless tobacco: Former     Types: Chew   Substance Use Topics    Alcohol use: Yes     Comment: rare             12/13/2023     7:52 AM   Alcohol Use   Prescreen: >3 drinks/day or >7 drinks/week? No       Last PSA: No results found for: \"PSA\"    Reviewed orders with patient. Reviewed health maintenance and updated orders accordingly - Yes  Lab work is in process  Labs reviewed in EPIC  BP Readings from Last 3 Encounters: "   12/15/23 132/84   12/23/22 91/75   12/12/22 116/80    Wt Readings from Last 3 Encounters:   12/15/23 92.5 kg (204 lb)   12/23/22 98.9 kg (218 lb)   12/12/22 98.9 kg (218 lb)                  Patient Active Problem List   Diagnosis    Chronic migraine without aura without status migrainosus, not intractable    Esophageal reflux    Tobacco use disorder    CARDIOVASCULAR SCREENING; LDL GOAL LESS THAN 160    Hypertension goal BP (blood pressure) < 140/90    Anxiety    Trigeminal neuralgia    Status post craniotomy    Postoperative wound infection     Past Surgical History:   Procedure Laterality Date    COLONOSCOPY N/A 12/23/2022    Procedure: COLONOSCOPY, FLEXIBLE, WITH LESION REMOVAL USING SNARE;  Surgeon: Gino Sinha MD;  Location: [facility]    CRANIOTOMY, DECOMPRESS NEUROVASCULAR, COMBINED Right 04/07/2022    Procedure: Right microvascular decompression  Latex Free;  Surgeon: Grey Simeon MD;  [facility]    HERNIA REPAIR  Baby    IRRIGATION AND DEBRIDEMENT CRANIUM Right 05/06/2022    Procedure: Retro-Mastoid wound washout;  Surgeon: Grey Simeon MD;  Location: [facility]    PICC INSERTION - DOUBLE LUMEN Right 05/10/2022    right basilic 5 fr dl picc 44 cm    SURGICAL HISTORY OF -   1969    right inguinal hernioplasty    SURGICAL HISTORY OF -   11/16/1993    lysis of two penile adhesions and cauterization of penile condyloma.       Social History     Tobacco Use    Smoking status: Every Day     Packs/day: 0.50     Years: 15.00     Additional pack years: 0.00     Total pack years: 7.50     Types: Cigarettes    Smokeless tobacco: Former     Types: Chew   Substance Use Topics    Alcohol use: Yes     Comment: rare     Family History   Problem Relation Age of Onset    Hypertension Father     Hypertension Brother     C.A.D. Maternal Grandmother         40's    C.A.D. Maternal Aunt         40's    C.A.D. Maternal Uncle         40's    Anesthesia Reaction No family hx of     Deep Vein Thrombosis  (DVT) No family hx of          Current Outpatient Medications   Medication Sig Dispense Refill    eletriptan (RELPAX) 40 MG tablet Take 1 tablet by mouth. repeat after 2 hours if needed. 24 tablet 11    galcanezumab-gnlm (EMGALITY) 120 MG/ML injection Inject 120 mg subcutaneous every 28 days 1 mL 12    hydrochlorothiazide (HYDRODIURIL) 25 MG tablet Take 1 tablet (25 mg) by mouth daily 90 tablet 3    losartan (COZAAR) 100 MG tablet Take 1 tablet (100 mg) by mouth daily 90 tablet 3    simvastatin (ZOCOR) 40 MG tablet Take 1 tablet (40 mg) by mouth at bedtime 90 tablet 3    acetaminophen (TYLENOL) 650 MG CR tablet Take 1,300 mg by mouth every 8 hours as needed for mild pain or fever 3 TIMES DAILY (Patient not taking: Reported on 12/15/2023)       Allergies   Allergen Reactions    Nkda [No Known Drug Allergy]      Recent Labs   Lab Test 12/12/22  1542 05/31/22  1343 02/11/22  1625 12/09/21  1440 01/04/21  1716 12/11/19  1458   *  --   --  159* 127*  --    HDL 42  --   --  45 49  --    TRIG 258*  --   --  180* 200*  --    ALT  --  51  --  41  --   --    CR 1.31* 1.47*   < > 1.07 1.15 1.18   GFRESTIMATED 65 57*   < > 79 73 71   GFRESTBLACK  --   --   --   --  85 83   POTASSIUM 3.8 3.7   < > 4.6 3.8 4.1    < > = values in this interval not displayed.        Reviewed and updated as needed this visit by clinical staff   Tobacco  Allergies  Meds              Reviewed and updated as needed this visit by Provider                 Past Medical History:   Diagnosis Date    Herpes zoster without mention of complication 01/01/1991    Hypertension       Past Surgical History:   Procedure Laterality Date    COLONOSCOPY N/A 12/23/2022    Procedure: COLONOSCOPY, FLEXIBLE, WITH LESION REMOVAL USING SNARE;  Surgeon: Gino Sinha MD;  Location: [facility]    CRANIOTOMY, DECOMPRESS NEUROVASCULAR, COMBINED Right 04/07/2022    Procedure: Right microvascular decompression  Latex Free;  Surgeon: Grey Simeon MD;  Location:   "OR    HERNIA REPAIR  Baby    IRRIGATION AND DEBRIDEMENT CRANIUM Right 05/06/2022    Procedure: Retro-Mastoid wound washout;  Surgeon: Grey Simeon MD;  [facility]    PICC INSERTION - DOUBLE LUMEN Right 05/10/2022    right basilic 5 fr dl picc 44 cm    SURGICAL HISTORY OF -   1969    right inguinal hernioplasty    SURGICAL HISTORY OF -   11/16/1993    lysis of two penile adhesions and cauterization of penile condyloma.       Review of Systems   Constitutional:  Negative for chills and fever.   HENT:  Negative for congestion, ear pain, hearing loss and sore throat.    Eyes:  Negative for pain and visual disturbance.   Respiratory:  Negative for cough and shortness of breath.    Cardiovascular:  Negative for chest pain, palpitations and peripheral edema.   Gastrointestinal:  Negative for abdominal pain, constipation, diarrhea, heartburn, hematochezia and nausea.   Genitourinary:  Negative for dysuria, frequency, genital sores, hematuria, impotence, penile discharge and urgency.   Musculoskeletal:  Negative for arthralgias, joint swelling and myalgias.   Skin:  Negative for rash.   Neurological:  Positive for headaches. Negative for dizziness, weakness and paresthesias.   Psychiatric/Behavioral:  Negative for mood changes. The patient is not nervous/anxious.      OBJECTIVE:   /84   Pulse 79   Temp 97.8  F (36.6  C) (Tympanic)   Resp 16   Ht 1.803 m (5' 11\")   Wt 92.5 kg (204 lb)   SpO2 99%   BMI 28.45 kg/m      Physical Exam  GENERAL: healthy, alert and no distress  EYES: Eyes grossly normal to inspection, PERRL and conjunctivae and sclerae normal  HENT: ear canals and TM's normal, nose and mouth without ulcers or lesions  NECK: no adenopathy, no asymmetry, masses, or scars and thyroid normal to palpation  RESP: lungs clear to auscultation - no rales, rhonchi or wheezes  CV: regular rate and rhythm, normal S1 S2, no S3 or S4, no murmur, click or rub, no peripheral edema and peripheral " "pulses strong  ABDOMEN: soft, nontender, no hepatosplenomegaly, no masses and bowel sounds normal  MS: no gross musculoskeletal defects noted, no edema  SKIN: no suspicious lesions or rashes  NEURO: Normal strength and tone, mentation intact and speech normal  PSYCH: mentation appears normal, affect normal/bright    ASSESSMENT/PLAN:       ICD-10-CM    1. Routine general medical examination at a health care facility  Z00.00       2. Essential hypertension with goal blood pressure less than 140/90  I10 hydrochlorothiazide (HYDRODIURIL) 25 MG tablet     losartan (COZAAR) 100 MG tablet     Basic metabolic panel  (Ca, Cl, CO2, Creat, Gluc, K, Na, BUN)      3. Hyperlipidemia LDL goal <160  E78.5 simvastatin (ZOCOR) 40 MG tablet     Lipid panel reflex to direct LDL Non-fasting      4. Chronic migraine without aura without status migrainosus, not intractable  G43.709 eletriptan (RELPAX) 40 MG tablet      5. Screening for prostate cancer  Z12.5 PSA, screen        Healthy Male exam completed today.  I discussed preventative measures with the patient including continuing with lifestyle modifications of a balanced diet and regular cardiovascular exercise.  I discussed self testicular exams and how to complete these.      Doing well with chronic conditions and medications refilled for patient today no side effects of medications.  I encouraged patient to follow-up yearly for annual physicals and sooner as needed.      Patient has been advised of split billing requirements and indicates understanding: Yes      COUNSELING:   Reviewed preventive health counseling, as reflected in patient instructions       Regular exercise       Healthy diet/nutrition      BMI:   Estimated body mass index is 28.45 kg/m  as calculated from the following:    Height as of this encounter: 1.803 m (5' 11\").    Weight as of this encounter: 92.5 kg (204 lb).   Weight management plan: Discussed healthy diet and exercise guidelines      He reports that he " has been smoking cigarettes. He has a 7.50 pack-year smoking history. He has quit using smokeless tobacco.  His smokeless tobacco use included chew.  Nicotine/Tobacco Cessation Plan:   Information offered: Patient not interested at this time            KATHERINE Jaramillo CNP  [facility]

## 2024-11-22 ENCOUNTER — TELEPHONE (OUTPATIENT)
Dept: NEUROLOGY | Facility: CLINIC | Age: 54
End: 2024-11-22
Payer: COMMERCIAL

## 2024-11-22 DIAGNOSIS — G43.709 CHRONIC MIGRAINE WITHOUT AURA WITHOUT STATUS MIGRAINOSUS, NOT INTRACTABLE: ICD-10-CM

## 2024-11-25 RX ORDER — ELETRIPTAN HYDROBROMIDE 40 MG/1
TABLET, FILM COATED ORAL
Qty: 24 TABLET | Refills: 5 | Status: SHIPPED | OUTPATIENT
Start: 2024-11-25

## 2024-11-25 NOTE — TELEPHONE ENCOUNTER
PA Initiation    Medication: EMGALITY 120 MG/ML SC SOAJ  Insurance Company: [employer] - Phone [phone] Fax [phone]  Pharmacy Filling the Rx: [facility] - [place] [place] - [address] [facility]  Filling Pharmacy Phone: [phone]  Filling Pharmacy Fax:    Start Date: 11/26/2024

## 2024-11-26 NOTE — TELEPHONE ENCOUNTER
Prior Authorization Approval    Medication: EMGALITY 120 MG/ML SC SOAJ  Authorization Effective Date: 11/26/2024  Authorization Expiration Date: 11/26/2025  Approved Dose/Quantity:   Reference #:     Insurance Company: [employer] [phone] Fax [phone]  Expected CoPay: $    CoPay Card Available:      Financial Assistance Needed:   Which Pharmacy is filling the prescription: [facility] [place], [place] - [address]  Pharmacy Notified: y  Patient Notified: Instructed pharmacy to notify patient once order is ready.

## 2024-12-16 ENCOUNTER — OFFICE VISIT (OUTPATIENT)
Dept: FAMILY MEDICINE | Facility: CLINIC | Age: 54
End: 2024-12-16
Payer: COMMERCIAL

## 2024-12-16 VITALS
RESPIRATION RATE: 16 BRPM | SYSTOLIC BLOOD PRESSURE: 142 MMHG | HEIGHT: 71 IN | BODY MASS INDEX: 27.3 KG/M2 | WEIGHT: 195 LBS | TEMPERATURE: 97.2 F | HEART RATE: 94 BPM | DIASTOLIC BLOOD PRESSURE: 98 MMHG | OXYGEN SATURATION: 98 %

## 2024-12-16 DIAGNOSIS — I10 HYPERTENSION GOAL BP (BLOOD PRESSURE) < 140/90: ICD-10-CM

## 2024-12-16 DIAGNOSIS — Z12.5 SCREENING FOR PROSTATE CANCER: ICD-10-CM

## 2024-12-16 DIAGNOSIS — Z00.00 ROUTINE GENERAL MEDICAL EXAMINATION AT A HEALTH CARE FACILITY: Primary | ICD-10-CM

## 2024-12-16 DIAGNOSIS — E78.5 HYPERLIPIDEMIA LDL GOAL <160: ICD-10-CM

## 2024-12-16 DIAGNOSIS — G43.709 CHRONIC MIGRAINE WITHOUT AURA WITHOUT STATUS MIGRAINOSUS, NOT INTRACTABLE: ICD-10-CM

## 2024-12-16 LAB
ANION GAP SERPL CALCULATED.3IONS-SCNC: 12 MMOL/L (ref 7–15)
BUN SERPL-MCNC: 11.2 MG/DL (ref 6–20)
CALCIUM SERPL-MCNC: 9.4 MG/DL (ref 8.8–10.4)
CHLORIDE SERPL-SCNC: 101 MMOL/L (ref 98–107)
CHOLEST SERPL-MCNC: 184 MG/DL
CREAT SERPL-MCNC: 1.26 MG/DL (ref 0.67–1.17)
EGFRCR SERPLBLD CKD-EPI 2021: 67 ML/MIN/1.73M2
FASTING STATUS PATIENT QL REPORTED: NO
FASTING STATUS PATIENT QL REPORTED: NO
GLUCOSE SERPL-MCNC: 83 MG/DL (ref 70–99)
HCO3 SERPL-SCNC: 28 MMOL/L (ref 22–29)
HDLC SERPL-MCNC: 49 MG/DL
LDLC SERPL CALC-MCNC: 109 MG/DL
NONHDLC SERPL-MCNC: 135 MG/DL
POTASSIUM SERPL-SCNC: 3.8 MMOL/L (ref 3.4–5.3)
PSA SERPL DL<=0.01 NG/ML-MCNC: 1.22 NG/ML (ref 0–3.5)
SODIUM SERPL-SCNC: 141 MMOL/L (ref 135–145)
TRIGL SERPL-MCNC: 131 MG/DL

## 2024-12-16 PROCEDURE — G0103 PSA SCREENING: HCPCS | Performed by: NURSE PRACTITIONER

## 2024-12-16 PROCEDURE — 80061 LIPID PANEL: CPT | Performed by: NURSE PRACTITIONER

## 2024-12-16 PROCEDURE — 90471 IMMUNIZATION ADMIN: CPT | Performed by: NURSE PRACTITIONER

## 2024-12-16 PROCEDURE — 36415 COLL VENOUS BLD VENIPUNCTURE: CPT | Performed by: NURSE PRACTITIONER

## 2024-12-16 PROCEDURE — 99214 OFFICE O/P EST MOD 30 MIN: CPT | Mod: 25 | Performed by: NURSE PRACTITIONER

## 2024-12-16 PROCEDURE — 90673 RIV3 VACCINE NO PRESERV IM: CPT | Performed by: NURSE PRACTITIONER

## 2024-12-16 PROCEDURE — 90677 PCV20 VACCINE IM: CPT | Performed by: NURSE PRACTITIONER

## 2024-12-16 PROCEDURE — 90472 IMMUNIZATION ADMIN EACH ADD: CPT | Performed by: NURSE PRACTITIONER

## 2024-12-16 PROCEDURE — 80048 BASIC METABOLIC PNL TOTAL CA: CPT | Performed by: NURSE PRACTITIONER

## 2024-12-16 PROCEDURE — 99396 PREV VISIT EST AGE 40-64: CPT | Mod: 25 | Performed by: NURSE PRACTITIONER

## 2024-12-16 RX ORDER — SIMVASTATIN 40 MG
40 TABLET ORAL AT BEDTIME
Qty: 90 TABLET | Refills: 3 | Status: SHIPPED | OUTPATIENT
Start: 2024-12-16

## 2024-12-16 RX ORDER — LOSARTAN POTASSIUM 100 MG/1
100 TABLET ORAL DAILY
Qty: 90 TABLET | Refills: 3 | Status: SHIPPED | OUTPATIENT
Start: 2024-12-16

## 2024-12-16 RX ORDER — AMLODIPINE BESYLATE 5 MG/1
5 TABLET ORAL DAILY
Qty: 90 TABLET | Refills: 0 | Status: SHIPPED | OUTPATIENT
Start: 2024-12-16

## 2024-12-16 SDOH — HEALTH STABILITY: PHYSICAL HEALTH: ON AVERAGE, HOW MANY DAYS PER WEEK DO YOU ENGAGE IN MODERATE TO STRENUOUS EXERCISE (LIKE A BRISK WALK)?: 0 DAYS

## 2024-12-16 SDOH — HEALTH STABILITY: PHYSICAL HEALTH: ON AVERAGE, HOW MANY MINUTES DO YOU ENGAGE IN EXERCISE AT THIS LEVEL?: 0 MIN

## 2024-12-16 ASSESSMENT — PAIN SCALES - GENERAL: PAINLEVEL_OUTOF10: NO PAIN (0)

## 2024-12-16 ASSESSMENT — SOCIAL DETERMINANTS OF HEALTH (SDOH): HOW OFTEN DO YOU GET TOGETHER WITH FRIENDS OR RELATIVES?: THREE TIMES A WEEK

## 2024-12-16 NOTE — PATIENT INSTRUCTIONS
Patient Education   Preventive Care Advice   This is general advice given by our system to help you stay healthy. However, your care team may have specific advice just for you. Please talk to your care team about your preventive care needs.  Nutrition  Eat 5 or more servings of fruits and vegetables each day.  Try wheat bread, brown rice and whole grain pasta (instead of white bread, rice, and pasta).  Get enough calcium and vitamin D. Check the label on foods and aim for 100% of the RDA (recommended daily allowance).  Lifestyle  Exercise at least 150 minutes each week  (30 minutes a day, 5 days a week).  Do muscle strengthening activities 2 days a week. These help control your weight and prevent disease.  No smoking.  Wear sunscreen to prevent skin cancer.  Have a dental exam and cleaning every 6 months.  Yearly exams  See your health care team every year to talk about:  Any changes in your health.  Any medicines your care team has prescribed.  Preventive care, family planning, and ways to prevent chronic diseases.  Shots (vaccines)   HPV shots (up to age 26), if you've never had them before.  Hepatitis B shots (up to age 59), if you've never had them before.  COVID-19 shot: Get this shot when it's due.  Flu shot: Get a flu shot every year.  Tetanus shot: Get a tetanus shot every 10 years.  Pneumococcal, hepatitis A, and RSV shots: Ask your care team if you need these based on your risk.  Shingles shot (for age 50 and up)  General health tests  Diabetes screening:  Starting at age 35, Get screened for diabetes at least every 3 years.  If you are younger than age 35, ask your care team if you should be screened for diabetes.  Cholesterol test: At age 39, start having a cholesterol test every 5 years, or more often if advised.  Bone density scan (DEXA): At age 50, ask your care team if you should have this scan for osteoporosis (brittle bones).  Hepatitis C: Get tested at least once in your life.  STIs (sexually  transmitted infections)  Before age 24: Ask your care team if you should be screened for STIs.  After age 24: Get screened for STIs if you're at risk. You are at risk for STIs (including HIV) if:  You are sexually active with more than one person.  You don't use condoms every time.  You or a partner was diagnosed with a sexually transmitted infection.  If you are at risk for HIV, ask about PrEP medicine to prevent HIV.  Get tested for HIV at least once in your life, whether you are at risk for HIV or not.  Cancer screening tests  Cervical cancer screening: If you have a cervix, begin getting regular cervical cancer screening tests starting at age 21.  Breast cancer scan (mammogram): If you've ever had breasts, begin having regular mammograms starting at age 40. This is a scan to check for breast cancer.  Colon cancer screening: It is important to start screening for colon cancer at age 45.  Have a colonoscopy test every 10 years (or more often if you're at risk) Or, ask your provider about stool tests like a FIT test every year or Cologuard test every 3 years.  To learn more about your testing options, visit:   .  For help making a decision, visit:   https://bit.ly/si94727.  Prostate cancer screening test: If you have a prostate, ask your care team if a prostate cancer screening test (PSA) at age 55 is right for you.  Lung cancer screening: If you are a current or former smoker ages 50 to 80, ask your care team if ongoing lung cancer screenings are right for you.  For informational purposes only. Not to replace the advice of your health care provider. Copyright   2023 [facility]. All rights reserved. Clinically reviewed by the [facility] Transitions Program. [employer] 224392 - REV 01/24.  Learning About Stress  What is stress?     Stress is your body's response to a hard situation. Your body can have a physical, emotional, or mental response. Stress is a fact of life for most people, and it  affects everyone differently. What causes stress for you may not be stressful for someone else.  A lot of things can cause stress. You may feel stress when you go on a job interview, take a test, or run a race. This kind of short-term stress is normal and even useful. It can help you if you need to work hard or react quickly. For example, stress can help you finish an important job on time.  Long-term stress is caused by ongoing stressful situations or events. Examples of long-term stress include long-term health problems, ongoing problems at work, or conflicts in your family. Long-term stress can harm your health.  How does stress affect your health?  When you are stressed, your body responds as though you are in danger. It makes hormones that speed up your heart, make you breathe faster, and give you a burst of energy. This is called the fight-or-flight stress response. If the stress is over quickly, your body goes back to normal and no harm is done.  But if stress happens too often or lasts too long, it can have bad effects. Long-term stress can make you more likely to get sick, and it can make symptoms of some diseases worse. If you tense up when you are stressed, you may develop neck, shoulder, or low back pain. Stress is linked to high blood pressure and heart disease.  Stress also harms your emotional health. It can make you pereira, tense, or depressed. Your relationships may suffer, and you may not do well at work or school.  What can you do to manage stress?  You can try these things to help manage stress:   Do something active. Exercise or activity can help reduce stress. Walking is a great way to get started. Even everyday activities such as housecleaning or yard work can help.  Try yoga or robyn chi. These techniques combine exercise and meditation. You may need some training at first to learn them.  Do something you enjoy. For example, listen to music or go to a movie. Practice your hobby or do volunteer  "work.  Meditate. This can help you relax, because you are not worrying about what happened before or what may happen in the future.  Do guided imagery. Imagine yourself in any setting that helps you feel calm. You can use online videos, books, or a teacher to guide you.  Do breathing exercises. For example:  From a standing position, bend forward from the waist with your knees slightly bent. Let your arms dangle close to the floor.  Breathe in slowly and deeply as you return to a standing position. Roll up slowly and lift your head last.  Hold your breath for just a few seconds in the standing position.  Breathe out slowly and bend forward from the waist.  Let your feelings out. Talk, laugh, cry, and express anger when you need to. Talking with supportive friends or family, a counselor, or a thad leader about your feelings is a healthy way to relieve stress. Avoid discussing your feelings with people who make you feel worse.  Write. It may help to write about things that are bothering you. This helps you find out how much stress you feel and what is causing it. When you know this, you can find better ways to cope.  What can you do to prevent stress?  You might try some of these things to help prevent stress:  Manage your time. This helps you find time to do the things you want and need to do.  Get enough sleep. Your body recovers from the stresses of the day while you are sleeping.  Get support. Your family, friends, and community can make a difference in how you experience stress.  Limit your news feed. Avoid or limit time on social media or news that may make you feel stressed.  Do something active. Exercise or activity can help reduce stress. Walking is a great way to get started.  Where can you learn more?  Go to https://www.smartfundit.com.net/patiented  Enter N032 in the search box to learn more about \"Learning About Stress.\"  Current as of: October 24, 2023  Content Version: 14.2 2024 [employer]. "   Care instructions adapted under license by your healthcare professional. If you have questions about a medical condition or this instruction, always ask your healthcare professional. [employer] disclaims any warranty or liability for your use of this information.

## 2024-12-16 NOTE — PROGRESS NOTES
Preventive Care Visit  [facility]  KATHERINE Jaramillo CNP, Nurse Practitioner - Family  Dec 16, 2024      Assessment & Plan     Routine general medical examination at a health care facility  Healthy Male exam completed today.  I discussed preventative measures with the patient including continuing with lifestyle modifications of a balanced diet and regular cardiovascular exercise.  I discussed self testicular exams and how to complete these.  I encouraged patient to follow-up yearly for annual physicals and sooner as needed.    Hypertension goal BP (blood pressure) < 140/90  Elevated today, he has been off of hydrochlorothiazide. Will start amlodipine. Follow up with RN BP recheck. Refill of the losartan provided today.  - BASIC METABOLIC PANEL; Future  - amLODIPine (NORVASC) 5 MG tablet; Take 1 tablet (5 mg) by mouth daily.  - losartan (COZAAR) 100 MG tablet; Take 1 tablet (100 mg) by mouth daily.    Hyperlipidemia LDL goal <160  Rechecking lipids today, has been tolerating the medication well. If cholesterol is not well controlled, will consider switching medication.   - Lipid panel reflex to direct LDL Non-fasting; Future  - simvastatin (ZOCOR) 40 MG tablet; Take 1 tablet (40 mg) by mouth at bedtime.    Chronic migraine without aura without status migrainosus, not intractable  Follows with neurology, has been having a higher incidence of migraine headaches. Continue with prn use of relpax. Follow up regarding emgality with neurology.     Patient has been advised of split billing requirements and indicates understanding: Yes        Nicotine/Tobacco Cessation  He reports that he has been smoking cigarettes. He has a 7.5 pack-year smoking history. He has quit using smokeless tobacco.  His smokeless tobacco use included chew.  Nicotine/Tobacco Cessation Plan  Information offered: Patient not interested at this time      BMI  Estimated body mass index is 27.2 kg/m  as calculated from the  "following:    Height as of this encounter: 1.803 m (5' 11\").    Weight as of this encounter: 88.5 kg (195 lb).     Counseling  Appropriate preventive services were addressed with this patient via screening, questionnaire, or discussion as appropriate for fall prevention, nutrition, physical activity, Tobacco-use cessation, social engagement, weight loss and cognition.  Checklist reviewing preventive services available has been given to the patient.  Reviewed patient's diet, addressing concerns and/or questions.   He is at risk for psychosocial distress and has been provided with information to reduce risk.         Darin Borrego is a 54 year old, presenting for the following:  Physical        12/16/2024     4:11 PM   Additional Questions   Roomed by Deborah DOWNS MA        HPI  Has stopped the hydrochlorothiazide. Did not appreciate urinating all the time. He is willing to try a different medication.     Hyperlipidemia Follow-Up  Are you regularly taking any medication or supplement to lower your cholesterol?   Yes- Simvastatin  Are you having muscle aches or other side effects that you think could be caused by your cholesterol lowering medication?  No    Hypertension Follow-up  Do you check your blood pressure regularly outside of the clinic? No   Are you following a low salt diet? No  Are your blood pressures ever more than 140 on the top number (systolic) OR more   than 90 on the bottom number (diastolic), for example 140/90? No    Migraines are around 10 per month. Last couple months this has increased. He follows with a neurologist. He is on the emgality which is through the neurologist. He uses eletriptan 10 x monthly.     Health Care Directive  Patient does not have a Health Care Directive: Discussed advance care planning with patient; however, patient declined at this time.      12/16/2024   General Health   How would you rate your overall physical health? Good   Feel stress (tense, anxious, or unable to sleep) " To some extent      (!) STRESS CONCERN      12/16/2024   Nutrition   Three or more servings of calcium each day? (!) NO   Diet: Regular (no restrictions)   How many servings of fruit and vegetables per day? (!) 0-1   How many sweetened beverages each day? (!) 3            12/16/2024   Exercise   Days per week of moderate/strenous exercise 0 days   Average minutes spent exercising at this level 0 min      (!) EXERCISE CONCERN      12/16/2024   Social Factors   Frequency of gathering with friends or relatives Three times a week   Worry food won't last until get money to buy more No   Food not last or not have enough money for food? No   Do you have housing? (Housing is defined as stable permanent housing and does not include staying ouside in a car, in a tent, in an abandoned building, in an overnight shelter, or couch-surfing.) Yes   Are you worried about losing your housing? No   Lack of transportation? No   Unable to get utilities (heat,electricity)? No            12/16/2024   Fall Risk   Fallen 2 or more times in the past year? No    Trouble with walking or balance? No        Patient-reported          12/16/2024   Dental   Dentist two times every year? Yes            12/16/2024   TB Screening   Were you born outside of the [place]? No            Today's PHQ-2 Score:       12/16/2024    11:37 AM   PHQ-2 ( 1999 [employer])   Q1: Little interest or pleasure in doing things 0    Q2: Feeling down, depressed or hopeless 0    PHQ-2 Score 0    Q1: Little interest or pleasure in doing things Not at all   Q2: Feeling down, depressed or hopeless Not at all   PHQ-2 Score 0       Patient-reported           12/16/2024   Substance Use   Alcohol more than 3/day or more than 7/wk No   Do you use any other substances recreationally? No        Social History     Tobacco Use    Smoking status: Every Day     Current packs/day: 0.50     Average packs/day: 0.5 packs/day for 15.0 years (7.5 ttl pk-yrs)     Types: Cigarettes    Smokeless tobacco:  Former     Types: Chew   Vaping Use    Vaping status: Never Used   Substance Use Topics    Alcohol use: Yes     Comment: rare    Drug use: No           12/16/2024   STI Screening   New sexual partner(s) since last STI/HIV test? No      Last PSA:   Prostate Specific Antigen Screen   Date Value Ref Range Status   12/15/2023 1.13 0.00 - 3.50 ng/mL Final     ASCVD Risk   The 10-year ASCVD risk score (Hyun DALTON, et al., 2019) is: 15.3%    Values used to calculate the score:      Age: 54 years      Sex: Male      Is Non- : No      Diabetic: No      Tobacco smoker: Yes      Systolic Blood Pressure: 160 mmHg      Is BP treated: Yes      HDL Cholesterol: 47 mg/dL      Total Cholesterol: 169 mg/dL         Reviewed and updated as needed this visit by Provider                    Past Medical History:   Diagnosis Date    Herpes zoster without mention of complication 01/01/1991    Hypertension      Past Surgical History:   Procedure Laterality Date    COLONOSCOPY N/A 12/23/2022    Procedure: COLONOSCOPY, FLEXIBLE, WITH LESION REMOVAL USING SNARE;  Surgeon: Gino Sinha MD;  Location: [facility]    CRANIOTOMY, DECOMPRESS NEUROVASCULAR, COMBINED Right 04/07/2022    Procedure: Right microvascular decompression  Latex Free;  Surgeon: Grey Simeon MD;  Location: [facility]    HERNIA REPAIR  Baby    IRRIGATION AND DEBRIDEMENT CRANIUM Right 05/06/2022    Procedure: Retro-Mastoid wound washout;  Surgeon: Grey Simeon MD;  Location: [facility]    PICC INSERTION - DOUBLE LUMEN Right 05/10/2022    right basilic 5 fr dl picc 44 cm    SURGICAL HISTORY OF -   1969    right inguinal hernioplasty    SURGICAL HISTORY OF -   11/16/1993    lysis of two penile adhesions and cauterization of penile condyloma.     Lab work is in process  Labs reviewed in EPIC  BP Readings from Last 3 Encounters:   12/16/24 (!) 142/98   12/15/23 132/84   12/23/22 91/75    Wt Readings from Last 3 Encounters:   12/16/24 88.5  kg (195 lb)   12/15/23 92.5 kg (204 lb)   12/23/22 98.9 kg (218 lb)                  Patient Active Problem List   Diagnosis    Chronic migraine without aura without status migrainosus, not intractable    Esophageal reflux    Tobacco use disorder    CARDIOVASCULAR SCREENING; LDL GOAL LESS THAN 160    Hypertension goal BP (blood pressure) < 140/90    Anxiety    Trigeminal neuralgia    Status post craniotomy    Postoperative wound infection     Past Surgical History:   Procedure Laterality Date    COLONOSCOPY N/A 12/23/2022    Procedure: COLONOSCOPY, FLEXIBLE, WITH LESION REMOVAL USING SNARE;  Surgeon: Gino Sinha MD;  Location: [facility]    CRANIOTOMY, DECOMPRESS NEUROVASCULAR, COMBINED Right 04/07/2022    Procedure: Right microvascular decompression  Latex Free;  Surgeon: Grey Simeon MD;  Location: [facility]    HERNIA REPAIR  Baby    IRRIGATION AND DEBRIDEMENT CRANIUM Right 05/06/2022    Procedure: Retro-Mastoid wound washout;  Surgeon: Grey Simeon MD;  Location: [facility]    PICC INSERTION - DOUBLE LUMEN Right 05/10/2022    right basilic 5 fr dl picc 44 cm    SURGICAL HISTORY OF -   1969    right inguinal hernioplasty    SURGICAL HISTORY OF -   11/16/1993    lysis of two penile adhesions and cauterization of penile condyloma.       Social History     Tobacco Use    Smoking status: Every Day     Current packs/day: 0.50     Average packs/day: 0.5 packs/day for 15.0 years (7.5 ttl pk-yrs)     Types: Cigarettes    Smokeless tobacco: Former     Types: Chew   Substance Use Topics    Alcohol use: Yes     Comment: rare     Family History   Problem Relation Age of Onset    Hypertension Father     Hypertension Brother     C.A.D. Maternal Grandmother         40's    C.A.D. Maternal Aunt         40's    C.A.D. Maternal Uncle         40's    Anesthesia Reaction No family hx of     Deep Vein Thrombosis (DVT) No family hx of          Current Outpatient Medications   Medication Sig Dispense Refill     "acetaminophen (TYLENOL) 650 MG CR tablet Take 1,300 mg by mouth every 8 hours as needed for mild pain or fever. 3 TIMES DAILY      amLODIPine (NORVASC) 5 MG tablet Take 1 tablet (5 mg) by mouth daily. 90 tablet 0    eletriptan (RELPAX) 40 MG tablet Take 1 tablet by mouth. repeat after 2 hours if needed. 24 tablet 5    galcanezumab-gnlm (EMGALITY) 120 MG/ML injection Inject 120 mg subcutaneous every 28 days 1 mL 12    losartan (COZAAR) 100 MG tablet Take 1 tablet (100 mg) by mouth daily. 90 tablet 3    simvastatin (ZOCOR) 40 MG tablet Take 1 tablet (40 mg) by mouth at bedtime. 90 tablet 3     Allergies   Allergen Reactions    Nkda [No Known Drug Allergy]      Recent Labs   Lab Test 12/15/23  1537 12/12/22  1542 05/31/22  1343 02/11/22  1625 12/09/21  1440 01/04/21  1716 12/11/19  1458   LDL 96 112*  --   --  159* 127*  --    HDL 47 42  --   --  45 49  --    TRIG 132 258*  --   --  180* 200*  --    ALT  --   --  51  --  41  --   --    CR 1.27* 1.31* 1.47*   < > 1.07 1.15 1.18   GFRESTIMATED 67 65 57*   < > 79 73 71   GFRESTBLACK  --   --   --   --   --  85 83   POTASSIUM 4.3 3.8 3.7   < > 4.6 3.8 4.1    < > = values in this interval not displayed.          Review of Systems  Constitutional, neuro, ENT, endocrine, pulmonary, cardiac, gastrointestinal, genitourinary, musculoskeletal, integument and psychiatric systems are negative, except as otherwise noted.     Objective    Exam  BP (!) 160/100   Pulse 94   Temp 97.2  F (36.2  C) (Tympanic)   Resp 16   Ht 1.803 m (5' 11\")   Wt 88.5 kg (195 lb)   SpO2 98%   BMI 27.20 kg/m     Estimated body mass index is 27.2 kg/m  as calculated from the following:    Height as of this encounter: 1.803 m (5' 11\").    Weight as of this encounter: 88.5 kg (195 lb).    Physical Exam  GENERAL: alert and no distress  EYES: Eyes grossly normal to inspection, PERRL and conjunctivae and sclerae normal  HENT: ear canals and TM's normal, nose and mouth without ulcers or lesions  NECK: no " adenopathy, no asymmetry, masses, or scars  RESP: lungs clear to auscultation - no rales, rhonchi or wheezes  CV: regular rate and rhythm, normal S1 S2, no S3 or S4, no murmur, click or rub, no peripheral edema  ABDOMEN: soft, nontender, no hepatosplenomegaly, no masses and bowel sounds normal  MS: no gross musculoskeletal defects noted, no edema  SKIN: no suspicious lesions or rashes  NEURO: Normal strength and tone, mentation intact and speech normal  PSYCH: mentation appears normal, affect normal/bright        Signed Electronically by: KATHERINE Jaramillo CNP

## 2024-12-18 DIAGNOSIS — G43.709 CHRONIC MIGRAINE WITHOUT AURA WITHOUT STATUS MIGRAINOSUS, NOT INTRACTABLE: ICD-10-CM

## 2024-12-19 RX ORDER — GALCANEZUMAB 120 MG/ML
INJECTION, SOLUTION SUBCUTANEOUS
Qty: 1 ML | Refills: 5 | Status: SHIPPED | OUTPATIENT
Start: 2024-12-19

## 2025-03-04 ENCOUNTER — TELEPHONE (OUTPATIENT)
Dept: FAMILY MEDICINE | Facility: CLINIC | Age: 55
End: 2025-03-04
Payer: COMMERCIAL

## 2025-03-04 NOTE — TELEPHONE ENCOUNTER
Patient Quality Outreach    Patient is due for the following:   Hypertension -  BP check    Action(s) Taken:   Schedule a nurse only visit for BP check    Type of outreach:    Sent [employer] message.    Questions for provider review:    None           Elsie Cristina

## 2025-06-08 ENCOUNTER — HOSPITAL ENCOUNTER (EMERGENCY)
Facility: CLINIC | Age: 55
Discharge: ANOTHER HOSPITAL | End: 2025-06-08
Attending: EMERGENCY MEDICINE | Admitting: EMERGENCY MEDICINE
Payer: COMMERCIAL

## 2025-06-08 ENCOUNTER — HOSPITAL ENCOUNTER (EMERGENCY)
Facility: HOSPITAL | Age: 55
Discharge: STILL ADMITTED OR ADMINISTRATIVE | End: 2025-06-08
Payer: COMMERCIAL

## 2025-06-08 ENCOUNTER — APPOINTMENT (OUTPATIENT)
Dept: GENERAL RADIOLOGY | Facility: CLINIC | Age: 55
End: 2025-06-08
Attending: EMERGENCY MEDICINE
Payer: COMMERCIAL

## 2025-06-08 ENCOUNTER — HOSPITAL ENCOUNTER (INPATIENT)
Facility: HOSPITAL | Age: 55
LOS: 2 days | Discharge: HOME | End: 2025-06-10
Attending: INTERNAL MEDICINE
Payer: COMMERCIAL

## 2025-06-08 VITALS
HEIGHT: 71 IN | OXYGEN SATURATION: 90 % | DIASTOLIC BLOOD PRESSURE: 85 MMHG | RESPIRATION RATE: 15 BRPM | BODY MASS INDEX: 27.3 KG/M2 | WEIGHT: 195 LBS | TEMPERATURE: 94.5 F | SYSTOLIC BLOOD PRESSURE: 123 MMHG | HEART RATE: 101 BPM

## 2025-06-08 DIAGNOSIS — I21.3 ST ELEVATION MYOCARDIAL INFARCTION (STEMI), UNSPECIFIED ARTERY (H): ICD-10-CM

## 2025-06-08 DIAGNOSIS — I21.3 ST ELEVATION MI (STEMI) (H): ICD-10-CM

## 2025-06-08 DIAGNOSIS — I21.21 ST ELEVATION MYOCARDIAL INFARCTION INVOLVING LEFT CIRCUMFLEX CORONARY ARTERY (H): Primary | ICD-10-CM

## 2025-06-08 DIAGNOSIS — T40.2X1A OPIOID OVERDOSE, ACCIDENTAL OR UNINTENTIONAL, INITIAL ENCOUNTER (H): ICD-10-CM

## 2025-06-08 LAB
ACT BLD: 166 SECONDS (ref 74–150)
ACT BLD: 223 SECONDS (ref 74–150)
ANION GAP SERPL CALCULATED.3IONS-SCNC: 16 MMOL/L (ref 7–15)
BASE EXCESS BLDV CALC-SCNC: -5.7 MMOL/L (ref -3–3)
BASOPHILS # BLD AUTO: 0.1 10E3/UL (ref 0–0.2)
BASOPHILS NFR BLD AUTO: 1 %
BUN SERPL-MCNC: 15.2 MG/DL (ref 6–20)
CALCIUM SERPL-MCNC: 8.6 MG/DL (ref 8.8–10.4)
CHLORIDE SERPL-SCNC: 102 MMOL/L (ref 98–107)
CHOLEST SERPL-MCNC: 152 MG/DL
CREAT SERPL-MCNC: 1.6 MG/DL (ref 0.67–1.17)
D DIMER PPP FEU-MCNC: 2.68 UG/ML FEU (ref 0–0.5)
EGFRCR SERPLBLD CKD-EPI 2021: 51 ML/MIN/1.73M2
EOSINOPHIL # BLD AUTO: 0.1 10E3/UL (ref 0–0.7)
EOSINOPHIL NFR BLD AUTO: 1 %
ERYTHROCYTE [DISTWIDTH] IN BLOOD BY AUTOMATED COUNT: 13.2 % (ref 10–15)
GLUCOSE SERPL-MCNC: 295 MG/DL (ref 70–99)
HCO3 BLDV-SCNC: 22 MMOL/L (ref 21–28)
HCO3 SERPL-SCNC: 20 MMOL/L (ref 22–29)
HCT VFR BLD AUTO: 47.4 % (ref 40–53)
HDLC SERPL-MCNC: 50 MG/DL
HGB BLD-MCNC: 15.7 G/DL (ref 13.3–17.7)
IMM GRANULOCYTES # BLD: 0.3 10E3/UL
IMM GRANULOCYTES NFR BLD: 1 %
LDLC SERPL CALC-MCNC: 92 MG/DL
LYMPHOCYTES # BLD AUTO: 1.2 10E3/UL (ref 0.8–5.3)
LYMPHOCYTES NFR BLD AUTO: 5 %
MCH RBC QN AUTO: 32.7 PG (ref 26.5–33)
MCHC RBC AUTO-ENTMCNC: 33.1 G/DL (ref 31.5–36.5)
MCV RBC AUTO: 99 FL (ref 78–100)
MONOCYTES # BLD AUTO: 0.8 10E3/UL (ref 0–1.3)
MONOCYTES NFR BLD AUTO: 3 %
NEUTROPHILS # BLD AUTO: 22.8 10E3/UL (ref 1.6–8.3)
NEUTROPHILS NFR BLD AUTO: 90 %
NONHDLC SERPL-MCNC: 102 MG/DL
NRBC # BLD AUTO: 0 10E3/UL
NRBC BLD AUTO-RTO: 0 /100
O2/TOTAL GAS SETTING VFR VENT: 6 %
OXYHGB MFR BLDV: 66 % (ref 70–75)
PCO2 BLDV: 52 MM HG (ref 40–50)
PH BLDV: 7.24 [PH] (ref 7.32–7.43)
PLATELET # BLD AUTO: 235 10E3/UL (ref 150–450)
PO2 BLDV: 40 MM HG (ref 25–47)
POTASSIUM SERPL-SCNC: 4.6 MMOL/L (ref 3.4–5.3)
RBC # BLD AUTO: 4.8 10E6/UL (ref 4.4–5.9)
SAO2 % BLDV: 69.6 % (ref 70–75)
SODIUM SERPL-SCNC: 138 MMOL/L (ref 135–145)
TRIGL SERPL-MCNC: 49 MG/DL
TROPONIN T SERPL HS-MCNC: 24 NG/L
WBC # BLD AUTO: 25.3 10E3/UL (ref 4–11)

## 2025-06-08 PROCEDURE — 99222 1ST HOSP IP/OBS MODERATE 55: CPT | Mod: 25 | Performed by: INTERNAL MEDICINE

## 2025-06-08 PROCEDURE — 250N000013 HC RX MED GY IP 250 OP 250 PS 637: Performed by: EMERGENCY MEDICINE

## 2025-06-08 PROCEDURE — 82435 ASSAY OF BLOOD CHLORIDE: CPT | Performed by: EMERGENCY MEDICINE

## 2025-06-08 PROCEDURE — 93454 CORONARY ARTERY ANGIO S&I: CPT | Mod: 26 | Performed by: INTERNAL MEDICINE

## 2025-06-08 PROCEDURE — C1874 STENT, COATED/COV W/DEL SYS: HCPCS | Performed by: INTERNAL MEDICINE

## 2025-06-08 PROCEDURE — 99291 CRITICAL CARE FIRST HOUR: CPT | Mod: 25

## 2025-06-08 PROCEDURE — 250N000011 HC RX IP 250 OP 636: Performed by: EMERGENCY MEDICINE

## 2025-06-08 PROCEDURE — 250N000011 HC RX IP 250 OP 636: Performed by: INTERNAL MEDICINE

## 2025-06-08 PROCEDURE — 93005 ELECTROCARDIOGRAM TRACING: CPT

## 2025-06-08 PROCEDURE — 272N000001 HC OR GENERAL SUPPLY STERILE: Performed by: INTERNAL MEDICINE

## 2025-06-08 PROCEDURE — 96375 TX/PRO/DX INJ NEW DRUG ADDON: CPT

## 2025-06-08 PROCEDURE — 82805 BLOOD GASES W/O2 SATURATION: CPT | Performed by: EMERGENCY MEDICINE

## 2025-06-08 PROCEDURE — C1769 GUIDE WIRE: HCPCS | Performed by: INTERNAL MEDICINE

## 2025-06-08 PROCEDURE — 96372 THER/PROPH/DIAG INJ SC/IM: CPT | Mod: 59 | Performed by: EMERGENCY MEDICINE

## 2025-06-08 PROCEDURE — 71045 X-RAY EXAM CHEST 1 VIEW: CPT

## 2025-06-08 PROCEDURE — 83036 HEMOGLOBIN GLYCOSYLATED A1C: CPT | Performed by: INTERNAL MEDICINE

## 2025-06-08 PROCEDURE — B211YZZ FLUOROSCOPY OF MULTIPLE CORONARY ARTERIES USING OTHER CONTRAST: ICD-10-PCS | Performed by: INTERNAL MEDICINE

## 2025-06-08 PROCEDURE — 255N000002 HC RX 255 OP 636: Performed by: INTERNAL MEDICINE

## 2025-06-08 PROCEDURE — C9606 PERC D-E COR REVASC W AMI S: HCPCS | Performed by: INTERNAL MEDICINE

## 2025-06-08 PROCEDURE — C1887 CATHETER, GUIDING: HCPCS | Performed by: INTERNAL MEDICINE

## 2025-06-08 PROCEDURE — 36415 COLL VENOUS BLD VENIPUNCTURE: CPT | Performed by: EMERGENCY MEDICINE

## 2025-06-08 PROCEDURE — 250N000009 HC RX 250: Performed by: INTERNAL MEDICINE

## 2025-06-08 PROCEDURE — 85347 COAGULATION TIME ACTIVATED: CPT

## 2025-06-08 PROCEDURE — 93010 ELECTROCARDIOGRAM REPORT: CPT | Performed by: EMERGENCY MEDICINE

## 2025-06-08 PROCEDURE — 92941 PRQ TRLML REVSC TOT OCCL AMI: CPT | Mod: LC | Performed by: INTERNAL MEDICINE

## 2025-06-08 PROCEDURE — B2111ZZ FLUOROSCOPY OF MULTIPLE CORONARY ARTERIES USING LOW OSMOLAR CONTRAST: ICD-10-PCS | Performed by: INTERNAL MEDICINE

## 2025-06-08 PROCEDURE — 82465 ASSAY BLD/SERUM CHOLESTEROL: CPT | Performed by: INTERNAL MEDICINE

## 2025-06-08 PROCEDURE — 99223 1ST HOSP IP/OBS HIGH 75: CPT | Performed by: INTERNAL MEDICINE

## 2025-06-08 PROCEDURE — 85025 COMPLETE CBC W/AUTO DIFF WBC: CPT | Performed by: EMERGENCY MEDICINE

## 2025-06-08 PROCEDURE — 93308 TTE F-UP OR LMTD: CPT

## 2025-06-08 PROCEDURE — 93308 TTE F-UP OR LMTD: CPT | Mod: 26 | Performed by: EMERGENCY MEDICINE

## 2025-06-08 PROCEDURE — 36415 COLL VENOUS BLD VENIPUNCTURE: CPT | Performed by: INTERNAL MEDICINE

## 2025-06-08 PROCEDURE — 93454 CORONARY ARTERY ANGIO S&I: CPT | Performed by: INTERNAL MEDICINE

## 2025-06-08 PROCEDURE — 96374 THER/PROPH/DIAG INJ IV PUSH: CPT

## 2025-06-08 PROCEDURE — 93005 ELECTROCARDIOGRAM TRACING: CPT | Mod: 76

## 2025-06-08 PROCEDURE — 99285 EMERGENCY DEPT VISIT HI MDM: CPT | Mod: 25 | Performed by: EMERGENCY MEDICINE

## 2025-06-08 PROCEDURE — 027034Z DILATION OF CORONARY ARTERY, ONE ARTERY WITH DRUG-ELUTING INTRALUMINAL DEVICE, PERCUTANEOUS APPROACH: ICD-10-PCS | Performed by: INTERNAL MEDICINE

## 2025-06-08 PROCEDURE — 93010 ELECTROCARDIOGRAM REPORT: CPT | Performed by: STUDENT IN AN ORGANIZED HEALTH CARE EDUCATION/TRAINING PROGRAM

## 2025-06-08 PROCEDURE — C1725 CATH, TRANSLUMIN NON-LASER: HCPCS | Performed by: INTERNAL MEDICINE

## 2025-06-08 PROCEDURE — C1894 INTRO/SHEATH, NON-LASER: HCPCS | Performed by: INTERNAL MEDICINE

## 2025-06-08 PROCEDURE — 84484 ASSAY OF TROPONIN QUANT: CPT | Performed by: EMERGENCY MEDICINE

## 2025-06-08 PROCEDURE — 200N000001 HC R&B ICU

## 2025-06-08 PROCEDURE — 85379 FIBRIN DEGRADATION QUANT: CPT | Performed by: EMERGENCY MEDICINE

## 2025-06-08 DEVICE — STENT COR ONYX FRONTIER 18X2.50MM ONYXNG25018UX: Type: IMPLANTABLE DEVICE | Status: FUNCTIONAL

## 2025-06-08 RX ORDER — OXYCODONE HYDROCHLORIDE 5 MG/1
10 TABLET ORAL EVERY 4 HOURS PRN
Status: DISCONTINUED | OUTPATIENT
Start: 2025-06-08 | End: 2025-06-08

## 2025-06-08 RX ORDER — NALOXONE HYDROCHLORIDE 0.4 MG/ML
0.4 INJECTION, SOLUTION INTRAMUSCULAR; INTRAVENOUS; SUBCUTANEOUS
Status: DISCONTINUED | OUTPATIENT
Start: 2025-06-08 | End: 2025-06-08 | Stop reason: HOSPADM

## 2025-06-08 RX ORDER — ONDANSETRON 4 MG/1
4 TABLET, ORALLY DISINTEGRATING ORAL EVERY 6 HOURS PRN
Status: DISCONTINUED | OUTPATIENT
Start: 2025-06-08 | End: 2025-06-10 | Stop reason: HOSPADM

## 2025-06-08 RX ORDER — VERAPAMIL HYDROCHLORIDE 2.5 MG/ML
INJECTION INTRAVENOUS
Status: DISCONTINUED | OUTPATIENT
Start: 2025-06-08 | End: 2025-06-08 | Stop reason: HOSPADM

## 2025-06-08 RX ORDER — ROSUVASTATIN CALCIUM 40 MG/1
40 TABLET, COATED ORAL DAILY
Status: DISCONTINUED | OUTPATIENT
Start: 2025-06-09 | End: 2025-06-09

## 2025-06-08 RX ORDER — NALOXONE HYDROCHLORIDE 0.4 MG/ML
0.2 INJECTION, SOLUTION INTRAMUSCULAR; INTRAVENOUS; SUBCUTANEOUS
Status: DISCONTINUED | OUTPATIENT
Start: 2025-06-08 | End: 2025-06-10 | Stop reason: HOSPADM

## 2025-06-08 RX ORDER — METOPROLOL TARTRATE 1 MG/ML
5 INJECTION, SOLUTION INTRAVENOUS
Status: DISCONTINUED | OUTPATIENT
Start: 2025-06-08 | End: 2025-06-10 | Stop reason: HOSPADM

## 2025-06-08 RX ORDER — ASPIRIN 81 MG/1
81 TABLET ORAL DAILY
Status: DISCONTINUED | OUTPATIENT
Start: 2025-06-09 | End: 2025-06-10 | Stop reason: HOSPADM

## 2025-06-08 RX ORDER — ASPIRIN 81 MG/1
81 TABLET, CHEWABLE ORAL ONCE
Status: DISCONTINUED | OUTPATIENT
Start: 2025-06-08 | End: 2025-06-08

## 2025-06-08 RX ORDER — ONDANSETRON 2 MG/ML
4 INJECTION INTRAMUSCULAR; INTRAVENOUS EVERY 6 HOURS PRN
Status: DISCONTINUED | OUTPATIENT
Start: 2025-06-08 | End: 2025-06-10 | Stop reason: HOSPADM

## 2025-06-08 RX ORDER — ACETAMINOPHEN 325 MG/1
650 TABLET ORAL EVERY 4 HOURS PRN
Status: DISCONTINUED | OUTPATIENT
Start: 2025-06-08 | End: 2025-06-10 | Stop reason: HOSPADM

## 2025-06-08 RX ORDER — ASPIRIN 81 MG/1
81 TABLET ORAL DAILY
Qty: 30 TABLET | Refills: 3 | Status: SHIPPED | OUTPATIENT
Start: 2025-06-09 | End: 2025-06-10

## 2025-06-08 RX ORDER — HYDRALAZINE HYDROCHLORIDE 20 MG/ML
10 INJECTION INTRAMUSCULAR; INTRAVENOUS EVERY 4 HOURS PRN
Status: DISCONTINUED | OUTPATIENT
Start: 2025-06-08 | End: 2025-06-10 | Stop reason: HOSPADM

## 2025-06-08 RX ORDER — ASPIRIN 81 MG/1
324 TABLET, CHEWABLE ORAL ONCE
Status: COMPLETED | OUTPATIENT
Start: 2025-06-08 | End: 2025-06-08

## 2025-06-08 RX ORDER — IODIXANOL 320 MG/ML
INJECTION, SOLUTION INTRAVASCULAR
Status: DISCONTINUED | OUTPATIENT
Start: 2025-06-08 | End: 2025-06-08 | Stop reason: HOSPADM

## 2025-06-08 RX ORDER — NALOXONE HYDROCHLORIDE 0.4 MG/ML
0.4 INJECTION, SOLUTION INTRAMUSCULAR; INTRAVENOUS; SUBCUTANEOUS
Status: DISCONTINUED | OUTPATIENT
Start: 2025-06-08 | End: 2025-06-08

## 2025-06-08 RX ORDER — FENTANYL CITRATE 50 UG/ML
25 INJECTION, SOLUTION INTRAMUSCULAR; INTRAVENOUS
Status: DISCONTINUED | OUTPATIENT
Start: 2025-06-08 | End: 2025-06-08

## 2025-06-08 RX ORDER — OXYCODONE HYDROCHLORIDE 5 MG/1
5 TABLET ORAL EVERY 4 HOURS PRN
Status: DISCONTINUED | OUTPATIENT
Start: 2025-06-08 | End: 2025-06-08

## 2025-06-08 RX ORDER — ATROPINE SULFATE 0.1 MG/ML
0.5 INJECTION INTRAVENOUS
Status: ACTIVE | OUTPATIENT
Start: 2025-06-08 | End: 2025-06-09

## 2025-06-08 RX ORDER — ACETAMINOPHEN 500 MG
500-1000 TABLET ORAL EVERY 6 HOURS PRN
COMMUNITY

## 2025-06-08 RX ORDER — NITROGLYCERIN 0.4 MG/1
0.4 TABLET SUBLINGUAL EVERY 5 MIN PRN
Status: DISCONTINUED | OUTPATIENT
Start: 2025-06-08 | End: 2025-06-10 | Stop reason: HOSPADM

## 2025-06-08 RX ORDER — FLUMAZENIL 0.1 MG/ML
0.2 INJECTION, SOLUTION INTRAVENOUS
Status: ACTIVE | OUTPATIENT
Start: 2025-06-08 | End: 2025-06-09

## 2025-06-08 RX ORDER — DOXYCYCLINE 100 MG/10ML
100 INJECTION, POWDER, LYOPHILIZED, FOR SOLUTION INTRAVENOUS EVERY 12 HOURS
Status: DISCONTINUED | OUTPATIENT
Start: 2025-06-08 | End: 2025-06-10 | Stop reason: HOSPADM

## 2025-06-08 RX ORDER — NALOXONE HYDROCHLORIDE 0.4 MG/ML
0.4 INJECTION, SOLUTION INTRAMUSCULAR; INTRAVENOUS; SUBCUTANEOUS
Status: DISCONTINUED | OUTPATIENT
Start: 2025-06-08 | End: 2025-06-10 | Stop reason: HOSPADM

## 2025-06-08 RX ORDER — TICAGRELOR 90 MG/1
90 TABLET, FILM COATED ORAL EVERY 12 HOURS
Status: DISCONTINUED | OUTPATIENT
Start: 2025-06-09 | End: 2025-06-09

## 2025-06-08 RX ORDER — NITROGLYCERIN 5 MG/ML
VIAL (ML) INTRAVENOUS
Status: DISCONTINUED | OUTPATIENT
Start: 2025-06-08 | End: 2025-06-08 | Stop reason: HOSPADM

## 2025-06-08 RX ORDER — SIMVASTATIN 40 MG
40 TABLET ORAL DAILY
Status: ON HOLD | COMMUNITY
End: 2025-06-10

## 2025-06-08 RX ORDER — TICAGRELOR 90 MG/1
90 TABLET, FILM COATED ORAL 2 TIMES DAILY
Qty: 180 TABLET | Refills: 3 | Status: SHIPPED | OUTPATIENT
Start: 2025-06-09 | End: 2025-06-10

## 2025-06-08 RX ORDER — SODIUM CHLORIDE 9 MG/ML
INJECTION, SOLUTION INTRAVENOUS CONTINUOUS
Status: ACTIVE | OUTPATIENT
Start: 2025-06-08 | End: 2025-06-09

## 2025-06-08 RX ORDER — HEPARIN SODIUM 1000 [USP'U]/ML
INJECTION, SOLUTION INTRAVENOUS; SUBCUTANEOUS
Status: DISCONTINUED | OUTPATIENT
Start: 2025-06-08 | End: 2025-06-08 | Stop reason: HOSPADM

## 2025-06-08 RX ORDER — NALOXONE HYDROCHLORIDE 0.4 MG/ML
0.2 INJECTION, SOLUTION INTRAMUSCULAR; INTRAVENOUS; SUBCUTANEOUS
Status: DISCONTINUED | OUTPATIENT
Start: 2025-06-08 | End: 2025-06-08

## 2025-06-08 RX ORDER — ONDANSETRON 2 MG/ML
4 INJECTION INTRAMUSCULAR; INTRAVENOUS ONCE
Status: COMPLETED | OUTPATIENT
Start: 2025-06-08 | End: 2025-06-08

## 2025-06-08 RX ORDER — PIPERACILLIN SODIUM, TAZOBACTAM SODIUM 3; .375 G/15ML; G/15ML
3.38 INJECTION, POWDER, LYOPHILIZED, FOR SOLUTION INTRAVENOUS EVERY 6 HOURS
Status: DISCONTINUED | OUTPATIENT
Start: 2025-06-08 | End: 2025-06-10 | Stop reason: HOSPADM

## 2025-06-08 RX ORDER — TICAGRELOR 90 MG/1
180 TABLET, FILM COATED ORAL ONCE
Status: COMPLETED | OUTPATIENT
Start: 2025-06-08 | End: 2025-06-08

## 2025-06-08 RX ORDER — ROSUVASTATIN CALCIUM 40 MG/1
40 TABLET, COATED ORAL DAILY
Qty: 90 TABLET | Refills: 3 | Status: SHIPPED | OUTPATIENT
Start: 2025-06-08 | End: 2025-06-10

## 2025-06-08 RX ORDER — NALOXONE HYDROCHLORIDE 0.4 MG/ML
0.2 INJECTION, SOLUTION INTRAMUSCULAR; INTRAVENOUS; SUBCUTANEOUS
Status: DISCONTINUED | OUTPATIENT
Start: 2025-06-08 | End: 2025-06-08 | Stop reason: HOSPADM

## 2025-06-08 RX ADMIN — TICAGRELOR 180 MG: 90 TABLET ORAL at 21:18

## 2025-06-08 RX ADMIN — ASPIRIN 81 MG CHEWABLE TABLET 324 MG: 81 TABLET CHEWABLE at 21:16

## 2025-06-08 RX ADMIN — ONDANSETRON 4 MG: 2 INJECTION, SOLUTION INTRAMUSCULAR; INTRAVENOUS at 20:51

## 2025-06-08 RX ADMIN — NALOXONE HYDROCHLORIDE 0.2 MG: 0.4 INJECTION, SOLUTION INTRAMUSCULAR; INTRAVENOUS; SUBCUTANEOUS at 21:18

## 2025-06-08 RX ADMIN — HEPARIN SODIUM 6000 UNITS: 1000 INJECTION, SOLUTION INTRAVENOUS; SUBCUTANEOUS at 21:20

## 2025-06-08 ASSESSMENT — ACTIVITIES OF DAILY LIVING (ADL)
ADLS_ACUITY_SCORE: 50

## 2025-06-08 ASSESSMENT — COLUMBIA-SUICIDE SEVERITY RATING SCALE - C-SSRS
6. HAVE YOU EVER DONE ANYTHING, STARTED TO DO ANYTHING, OR PREPARED TO DO ANYTHING TO END YOUR LIFE?: NO
2. HAVE YOU ACTUALLY HAD ANY THOUGHTS OF KILLING YOURSELF IN THE PAST MONTH?: NO
1. IN THE PAST MONTH, HAVE YOU WISHED YOU WERE DEAD OR WISHED YOU COULD GO TO SLEEP AND NOT WAKE UP?: NO

## 2025-06-08 NOTE — Clinical Note
Max pressure = 11 jessee. Total duration = 9 seconds.     Max pressure = 11 jessee. Total duration = 6 seconds.    Balloon reinflated a second time: Max pressure = 11 jessee. Total duration = 6 seconds.

## 2025-06-09 ENCOUNTER — APPOINTMENT (OUTPATIENT)
Dept: OCCUPATIONAL THERAPY | Facility: HOSPITAL | Age: 55
End: 2025-06-09
Attending: INTERNAL MEDICINE
Payer: COMMERCIAL

## 2025-06-09 ENCOUNTER — RESULTS FOLLOW-UP (OUTPATIENT)
Dept: NURSING | Facility: CLINIC | Age: 55
End: 2025-06-09

## 2025-06-09 ENCOUNTER — APPOINTMENT (OUTPATIENT)
Dept: CARDIOLOGY | Facility: HOSPITAL | Age: 55
End: 2025-06-09
Attending: INTERNAL MEDICINE
Payer: COMMERCIAL

## 2025-06-09 DIAGNOSIS — I21.29 ST ELEVATION MYOCARDIAL INFARCTION (STEMI) OF LATERAL WALL (H): ICD-10-CM

## 2025-06-09 DIAGNOSIS — I25.10 CORONARY ARTERY DISEASE INVOLVING NATIVE CORONARY ARTERY OF NATIVE HEART WITHOUT ANGINA PECTORIS: Primary | ICD-10-CM

## 2025-06-09 LAB
AMPHETAMINES UR QL SCN: ABNORMAL
ANION GAP SERPL CALCULATED.3IONS-SCNC: 6 MMOL/L (ref 7–15)
ATRIAL RATE - MUSE: 72 BPM
ATRIAL RATE - MUSE: 85 BPM
BARBITURATES UR QL SCN: ABNORMAL
BENZODIAZ UR QL SCN: ABNORMAL
BUN SERPL-MCNC: 15.3 MG/DL (ref 6–20)
BZE UR QL SCN: ABNORMAL
CALCIUM SERPL-MCNC: 8.2 MG/DL (ref 8.8–10.4)
CANNABINOIDS UR QL SCN: ABNORMAL
CHLORIDE SERPL-SCNC: 108 MMOL/L (ref 98–107)
CREAT SERPL-MCNC: 1.48 MG/DL (ref 0.67–1.17)
DIASTOLIC BLOOD PRESSURE - MUSE: NORMAL MMHG
DIASTOLIC BLOOD PRESSURE - MUSE: NORMAL MMHG
EGFRCR SERPLBLD CKD-EPI 2021: 56 ML/MIN/1.73M2
EST. AVERAGE GLUCOSE BLD GHB EST-MCNC: 114 MG/DL
FENTANYL UR QL: ABNORMAL
GLUCOSE SERPL-MCNC: 123 MG/DL (ref 70–99)
HBA1C MFR BLD: 5.6 %
HCO3 SERPL-SCNC: 24 MMOL/L (ref 22–29)
HOLD SPECIMEN: NORMAL
INTERPRETATION ECG - MUSE: NORMAL
INTERPRETATION ECG - MUSE: NORMAL
LVEF ECHO: NORMAL
OPIATES UR QL SCN: ABNORMAL
P AXIS - MUSE: 24 DEGREES
P AXIS - MUSE: 55 DEGREES
PCP QUAL URINE (ROCHE): ABNORMAL
POTASSIUM SERPL-SCNC: 5.2 MMOL/L (ref 3.4–5.3)
PR INTERVAL - MUSE: 144 MS
PR INTERVAL - MUSE: 164 MS
QRS DURATION - MUSE: 84 MS
QRS DURATION - MUSE: 94 MS
QT - MUSE: 396 MS
QT - MUSE: 398 MS
QTC - MUSE: 433 MS
QTC - MUSE: 473 MS
R AXIS - MUSE: 48 DEGREES
R AXIS - MUSE: 61 DEGREES
SODIUM SERPL-SCNC: 138 MMOL/L (ref 135–145)
SYSTOLIC BLOOD PRESSURE - MUSE: NORMAL MMHG
SYSTOLIC BLOOD PRESSURE - MUSE: NORMAL MMHG
T AXIS - MUSE: 47 DEGREES
T AXIS - MUSE: 56 DEGREES
VENTRICULAR RATE- MUSE: 72 BPM
VENTRICULAR RATE- MUSE: 85 BPM

## 2025-06-09 PROCEDURE — 258N000003 HC RX IP 258 OP 636: Performed by: INTERNAL MEDICINE

## 2025-06-09 PROCEDURE — 250N000011 HC RX IP 250 OP 636: Performed by: INTERNAL MEDICINE

## 2025-06-09 PROCEDURE — 93010 ELECTROCARDIOGRAM REPORT: CPT | Performed by: STUDENT IN AN ORGANIZED HEALTH CARE EDUCATION/TRAINING PROGRAM

## 2025-06-09 PROCEDURE — 97166 OT EVAL MOD COMPLEX 45 MIN: CPT | Mod: GO

## 2025-06-09 PROCEDURE — 93306 TTE W/DOPPLER COMPLETE: CPT | Mod: 26 | Performed by: GENERAL ACUTE CARE HOSPITAL

## 2025-06-09 PROCEDURE — 250N000013 HC RX MED GY IP 250 OP 250 PS 637: Performed by: INTERNAL MEDICINE

## 2025-06-09 PROCEDURE — 97535 SELF CARE MNGMENT TRAINING: CPT | Mod: GO

## 2025-06-09 PROCEDURE — 99233 SBSQ HOSP IP/OBS HIGH 50: CPT | Performed by: INTERNAL MEDICINE

## 2025-06-09 PROCEDURE — 93306 TTE W/DOPPLER COMPLETE: CPT

## 2025-06-09 PROCEDURE — 80307 DRUG TEST PRSMV CHEM ANLYZR: CPT | Performed by: INTERNAL MEDICINE

## 2025-06-09 PROCEDURE — 93005 ELECTROCARDIOGRAM TRACING: CPT

## 2025-06-09 PROCEDURE — 97110 THERAPEUTIC EXERCISES: CPT | Mod: GO

## 2025-06-09 PROCEDURE — 250N000013 HC RX MED GY IP 250 OP 250 PS 637: Performed by: GENERAL ACUTE CARE HOSPITAL

## 2025-06-09 PROCEDURE — 99254 IP/OBS CNSLTJ NEW/EST MOD 60: CPT | Performed by: GENERAL ACUTE CARE HOSPITAL

## 2025-06-09 PROCEDURE — 999N000157 HC STATISTIC RCP TIME EA 10 MIN

## 2025-06-09 PROCEDURE — 120N000004 HC R&B MS OVERFLOW

## 2025-06-09 PROCEDURE — 36415 COLL VENOUS BLD VENIPUNCTURE: CPT | Performed by: INTERNAL MEDICINE

## 2025-06-09 PROCEDURE — 80048 BASIC METABOLIC PNL TOTAL CA: CPT | Performed by: INTERNAL MEDICINE

## 2025-06-09 RX ORDER — ELETRIPTAN HYDROBROMIDE 40 MG/1
40 TABLET, FILM COATED ORAL
Status: COMPLETED | OUTPATIENT
Start: 2025-06-09 | End: 2025-06-09

## 2025-06-09 RX ORDER — SODIUM CHLORIDE, SODIUM LACTATE, POTASSIUM CHLORIDE, CALCIUM CHLORIDE 600; 310; 30; 20 MG/100ML; MG/100ML; MG/100ML; MG/100ML
INJECTION, SOLUTION INTRAVENOUS CONTINUOUS
Status: DISCONTINUED | OUTPATIENT
Start: 2025-06-09 | End: 2025-06-10

## 2025-06-09 RX ORDER — PRASUGREL 10 MG/1
60 TABLET, FILM COATED ORAL ONCE
Status: COMPLETED | OUTPATIENT
Start: 2025-06-09 | End: 2025-06-09

## 2025-06-09 RX ORDER — PRASUGREL 10 MG/1
10 TABLET, FILM COATED ORAL DAILY
Status: DISCONTINUED | OUTPATIENT
Start: 2025-06-10 | End: 2025-06-10 | Stop reason: HOSPADM

## 2025-06-09 RX ORDER — ELETRIPTAN HYDROBROMIDE 20 MG/1
20 TABLET, FILM COATED ORAL
Status: DISCONTINUED | OUTPATIENT
Start: 2025-06-09 | End: 2025-06-09

## 2025-06-09 RX ORDER — METOPROLOL SUCCINATE 25 MG/1
25 TABLET, EXTENDED RELEASE ORAL DAILY
Status: DISCONTINUED | OUTPATIENT
Start: 2025-06-09 | End: 2025-06-10 | Stop reason: HOSPADM

## 2025-06-09 RX ORDER — LORAZEPAM 2 MG/ML
0.5 INJECTION INTRAMUSCULAR ONCE
Status: COMPLETED | OUTPATIENT
Start: 2025-06-09 | End: 2025-06-09

## 2025-06-09 RX ORDER — NITROGLYCERIN 0.4 MG/1
TABLET SUBLINGUAL
Qty: 20 TABLET | Refills: 0 | Status: CANCELLED | OUTPATIENT
Start: 2025-06-09

## 2025-06-09 RX ORDER — ROSUVASTATIN CALCIUM 40 MG/1
40 TABLET, COATED ORAL DAILY
Status: DISCONTINUED | OUTPATIENT
Start: 2025-06-10 | End: 2025-06-10 | Stop reason: HOSPADM

## 2025-06-09 RX ORDER — METOPROLOL SUCCINATE 25 MG/1
25 TABLET, EXTENDED RELEASE ORAL DAILY
Qty: 30 TABLET | Refills: 0 | Status: CANCELLED | OUTPATIENT
Start: 2025-06-10 | End: 2025-07-10

## 2025-06-09 RX ORDER — CALCIUM CARBONATE 500 MG/1
500 TABLET, CHEWABLE ORAL DAILY PRN
Status: DISCONTINUED | OUTPATIENT
Start: 2025-06-09 | End: 2025-06-10 | Stop reason: HOSPADM

## 2025-06-09 RX ORDER — RIZATRIPTAN BENZOATE 10 MG/1
10 TABLET ORAL
Status: DISCONTINUED | OUTPATIENT
Start: 2025-06-09 | End: 2025-06-09

## 2025-06-09 RX ADMIN — ASPIRIN 81 MG: 81 TABLET, COATED ORAL at 08:30

## 2025-06-09 RX ADMIN — CALCIUM CARBONATE (ANTACID) CHEW TAB 500 MG 500 MG: 500 CHEW TAB at 18:45

## 2025-06-09 RX ADMIN — PRASUGREL 60 MG: 10 TABLET, FILM COATED ORAL at 12:26

## 2025-06-09 RX ADMIN — METOPROLOL SUCCINATE 25 MG: 25 TABLET, EXTENDED RELEASE ORAL at 12:26

## 2025-06-09 RX ADMIN — SODIUM CHLORIDE, SODIUM LACTATE, POTASSIUM CHLORIDE, AND CALCIUM CHLORIDE: .6; .31; .03; .02 INJECTION, SOLUTION INTRAVENOUS at 04:36

## 2025-06-09 RX ADMIN — CALCIUM CARBONATE (ANTACID) CHEW TAB 500 MG 500 MG: 500 CHEW TAB at 22:27

## 2025-06-09 RX ADMIN — SODIUM CHLORIDE: 0.9 INJECTION, SOLUTION INTRAVENOUS at 00:30

## 2025-06-09 RX ADMIN — DOXYCYCLINE 100 MG: 100 INJECTION, POWDER, LYOPHILIZED, FOR SOLUTION INTRAVENOUS at 00:22

## 2025-06-09 RX ADMIN — PIPERACILLIN AND TAZOBACTAM 3.38 G: 3; .375 INJECTION, POWDER, FOR SOLUTION INTRAVENOUS at 05:27

## 2025-06-09 RX ADMIN — DOXYCYCLINE 100 MG: 100 INJECTION, POWDER, LYOPHILIZED, FOR SOLUTION INTRAVENOUS at 10:39

## 2025-06-09 RX ADMIN — ROSUVASTATIN 40 MG: 40 TABLET, FILM COATED ORAL at 08:29

## 2025-06-09 RX ADMIN — ONDANSETRON 4 MG: 4 TABLET, ORALLY DISINTEGRATING ORAL at 08:31

## 2025-06-09 RX ADMIN — ELETRIPTAN HYDROBROMIDE 40 MG: 40 TABLET, FILM COATED ORAL at 01:04

## 2025-06-09 RX ADMIN — PIPERACILLIN AND TAZOBACTAM 3.38 G: 3; .375 INJECTION, POWDER, FOR SOLUTION INTRAVENOUS at 00:19

## 2025-06-09 RX ADMIN — TICAGRELOR 90 MG: 90 TABLET ORAL at 08:30

## 2025-06-09 RX ADMIN — LORAZEPAM 0.5 MG: 2 INJECTION INTRAMUSCULAR; INTRAVENOUS at 23:45

## 2025-06-09 RX ADMIN — PIPERACILLIN AND TAZOBACTAM 3.38 G: 3; .375 INJECTION, POWDER, FOR SOLUTION INTRAVENOUS at 17:23

## 2025-06-09 RX ADMIN — PIPERACILLIN AND TAZOBACTAM 3.38 G: 3; .375 INJECTION, POWDER, FOR SOLUTION INTRAVENOUS at 11:47

## 2025-06-09 RX ADMIN — SODIUM CHLORIDE, SODIUM LACTATE, POTASSIUM CHLORIDE, AND CALCIUM CHLORIDE: .6; .31; .03; .02 INJECTION, SOLUTION INTRAVENOUS at 22:24

## 2025-06-09 ASSESSMENT — ACTIVITIES OF DAILY LIVING (ADL)
ADLS_ACUITY_SCORE: 55
ADLS_ACUITY_SCORE: 50
ADLS_ACUITY_SCORE: 50
ADLS_ACUITY_SCORE: 55
ADLS_ACUITY_SCORE: 50
ADLS_ACUITY_SCORE: 55
ADLS_ACUITY_SCORE: 55
ADLS_ACUITY_SCORE: 50
ADLS_ACUITY_SCORE: 55
IADL_COMMENTS: IND. W/ IADL ROUTINE AT BASELINE
ADLS_ACUITY_SCORE: 55
ADLS_ACUITY_SCORE: 29
ADLS_ACUITY_SCORE: 55
ADLS_ACUITY_SCORE: 55
ADLS_ACUITY_SCORE: 50
ADLS_ACUITY_SCORE: 29

## 2025-06-09 NOTE — ED PROVIDER NOTES
History     Chief Complaint   Patient presents with    Drug Overdose     HPI  Jossue Torres is a 54 year old male who presents for evaluation after an overdose.  The patient was reportedly found down at home.  The patient admits to snorting a white powder.  EMS was called and found the patient to be unresponsive with shallow respirations.  Police gave intranasal naloxone and the patient woke up but is still very drowsy.  He is now complaining of feeling nauseous and is having bad heartburn.  No vomiting.  He says that he was trying to get high, denies attempted suicide    Allergies:  Allergies   Allergen Reactions    Nkda [No Known Drug Allergy]        Problem List:    Patient Active Problem List    Diagnosis Date Noted    Postoperative wound infection 05/06/2022     Priority: Medium    Status post craniotomy 04/07/2022     Priority: Medium    Trigeminal neuralgia 10/12/2021     Priority: Medium    Anxiety 03/18/2013     Priority: Medium    Hypertension goal BP (blood pressure) < 140/90 09/05/2011     Priority: Medium    CARDIOVASCULAR SCREENING; LDL GOAL LESS THAN 160 10/31/2010     Priority: Medium    Tobacco use disorder 03/10/2008     Priority: Medium    Chronic migraine without aura without status migrainosus, not intractable 01/24/2006     Priority: Medium     Diagnosis updated by automated process. Provider to review and confirm.      Esophageal reflux 01/24/2006     Priority: Medium        Past Medical History:    Past Medical History:   Diagnosis Date    Herpes zoster without mention of complication 01/01/1991    Hypertension        Past Surgical History:    Past Surgical History:   Procedure Laterality Date    COLONOSCOPY N/A 12/23/2022    Procedure: COLONOSCOPY, FLEXIBLE, WITH LESION REMOVAL USING SNARE;  Surgeon: Gino Sinha MD;  Location: [facility]    CRANIOTOMY, DECOMPRESS NEUROVASCULAR, COMBINED Right 04/07/2022    Procedure: Right microvascular decompression  Latex Free;  Surgeon: Grey Simeon  "MD Jamar;  Location: [facility]    HERNIA REPAIR  Baby    IRRIGATION AND DEBRIDEMENT CRANIUM Right 05/06/2022    Procedure: Retro-Mastoid wound washout;  Surgeon: Grey Simeon MD;  Location: [facility]    PICC INSERTION - DOUBLE LUMEN Right 05/10/2022    right basilic 5 fr dl picc 44 cm    SURGICAL HISTORY OF -   1969    right inguinal hernioplasty    SURGICAL HISTORY OF -   11/16/1993    lysis of two penile adhesions and cauterization of penile condyloma.       Family History:    Family History   Problem Relation Age of Onset    Hypertension Father     Hypertension Brother     C.A.D. Maternal Grandmother         40's    C.A.D. Maternal Aunt         40's    C.A.D. Maternal Uncle         40's    Anesthesia Reaction No family hx of     Deep Vein Thrombosis (DVT) No family hx of        Social History:  Marital Status:  Single [1]  Social History     Tobacco Use    Smoking status: Every Day     Current packs/day: 0.50     Average packs/day: 0.5 packs/day for 15.0 years (7.5 ttl pk-yrs)     Types: Cigarettes    Smokeless tobacco: Former     Types: Chew   Vaping Use    Vaping status: Never Used   Substance Use Topics    Alcohol use: Yes     Comment: rare    Drug use: No        Medications:    acetaminophen (TYLENOL) 650 MG CR tablet  amLODIPine (NORVASC) 5 MG tablet  eletriptan (RELPAX) 40 MG tablet  galcanezumab-gnlm (EMGALITY) 120 MG/ML injection  losartan (COZAAR) 100 MG tablet  simvastatin (ZOCOR) 40 MG tablet          Review of Systems    Physical Exam   BP: 119/83  Pulse: 96  Temp: (!) 94.5  F (34.7  C)  Resp: 20  Height: 180.3 cm (5' 11\")  Weight: 88.5 kg (195 lb)  SpO2: (!) 84 %      Physical Exam  Constitutional:       General: He is not in acute distress.     Appearance: He is well-developed.   HENT:      Head: Normocephalic and atraumatic.   Cardiovascular:      Rate and Rhythm: Tachycardia present.      Pulses:           Radial pulses are 2+ on the right side and 2+ on the left side.        Posterior " tibial pulses are 2+ on the right side and 2+ on the left side.   Pulmonary:      Effort: No respiratory distress.      Breath sounds: No stridor.   Musculoskeletal:      Right lower leg: No edema.      Left lower leg: No edema.   Skin:     General: Skin is warm and dry.   Neurological:      Mental Status: He is alert.         ED Course        Procedures    Results for orders placed during the hospital encounter of 06/08/25    POC US ECHO LIMITED    [facility] Procedure Note    Limited Bedside ED Cardiac Ultrasound:    PROCEDURE: PERFORMED BY: Dr. Austin Farris MD  INDICATIONS/SYMPTOM:  Chest Pain  PROBE: Cardiac phased array probe  BODY LOCATION: Chest  FINDINGS:  The ultrasound was performed utilizing the parasternal long axis, parasternal short axis, and apical 4 chamber views.  Cardiac contractility:  Present  Gross estimation of cardiac kinesis: normal  Pericardial Effusion:  None  RV:LV ratio: LV > RV  INTERPRETATION:    Chamber size and motion were grossly normal with LV > RV, normal cardiac kinesis.  No pericardial effusion was found.  IMAGE DOCUMENTATION: Images were archived to PACs system.            EKG Interpretation:      Interpreted by Austin Farris MD  Time reviewed: 2100  Symptoms at time of EKG: overdose   Rhythm: sinus   Rate: 98  Axis: Normal  Ectopy: none  Conduction: normal  ST Segments/ T Waves: ST Segement Elevation I and aVL and ST Segment Depression III and aVF  Q Waves: none  Comparison to prior: new ST changes as above    Clinical Impression: ST changes, concerning for ischemia         EKG Interpretation:      Interpreted by Austin Farris MD  Time reviewed:2112   Symptoms at time of EKG: chest pain   Rhythm: sinus   Rate: Normal  Axis: Normal  Ectopy: None  Conduction: Normal  ST Segments/ T Waves: ST Segement Elevation I and aVL and ST Segment Depression III and aVF  Q Waves: None  Comparison to prior: worsening ST changes    Clinical  Impression: STEMI       The patient has a STEMI     The patient was evaluated at 2100   Cath lab transfer at 2112 for cardiac intervention   ASA given in the ER                   Results for orders placed or performed during the hospital encounter of 06/08/25 (from the past 24 hours)   EKG 12 lead   Result Value Ref Range    Systolic Blood Pressure  mmHg    Diastolic Blood Pressure  mmHg    Ventricular Rate 98 BPM    Atrial Rate 98 BPM    DE Interval 170 ms    QRS Duration 98 ms     ms    QTc 492 ms    P Axis 43 degrees    R AXIS 42 degrees    T Axis 49 degrees    Interpretation ECG       Sinus rhythm  QTcB >= 480 msec  Abnormal ECG  When compared with ECG of 08-May-2022 15:11,  ST elevation now present in Lateral leads     POC US ECHO LIMITED    Impression    [facility] Procedure Note      Limited Bedside ED Cardiac Ultrasound:    PROCEDURE: PERFORMED BY: Dr. Austin Farris MD  INDICATIONS/SYMPTOM:  Chest Pain  PROBE: Cardiac phased array probe  BODY LOCATION: Chest  FINDINGS:   The ultrasound was performed utilizing the parasternal long axis, parasternal short axis, and apical 4 chamber views.  Cardiac contractility:  Present  Gross estimation of cardiac kinesis: normal  Pericardial Effusion:  None  RV:LV ratio: LV > RV  INTERPRETATION:    Chamber size and motion were grossly normal with LV > RV, normal cardiac kinesis.  No pericardial effusion was found.  IMAGE DOCUMENTATION: Images were archived to PACs system.        CBC with Platelets & Differential    Narrative    The following orders were created for panel order CBC with Platelets & Differential.  Procedure                               Abnormality         Status                     ---------                               -----------         ------                     CBC with platelets and ...[[id number]]  Abnormal            Final result                 Please view results for these tests on the individual orders.   D dimer quantitative    Result Value Ref Range    D-Dimer Quantitative 2.68 (H) 0.00 - 0.50 ug/mL FEU    Narrative    This D-dimer assay is intended for use in conjunction with a clinical pretest probability assessment model to exclude pulmonary embolism (PE) and deep venous thrombosis (DVT) in outpatients suspected of PE or DVT. The cut-off value is 0.50 ug/mL FEU.    For patients 50 years of age or older, the application of age-adjusted cut-off values for D-Dimer may increase the specificity without significant effect on sensitivity. The literature suggested calculation age adjusted cut-off in ug/L = age in years x 10 ug/L. The results in this laboratory are reported as ug/mL rather than ug/L. The calculation for age adjusted cut off in ug/mL= age in years x 0.01 ug/mL. For example, the cut off for a 76 year old male is 75 x 0.01 ug/mL = 0.76 ug/mL (760 ug/L).    M Liu et al. Age adjusted D-dimer cut-off levels to rule out pulmonary embolism: The ADJUST-PE Study. JACK 2014;311:9198-3809.; HJ Jessica et al. Diagnostic accuracy of conventional or age adjusted D-dimer cutoff values in older patients with suspected venous thromboembolism. Systemic review and meta-analysis. BMJ 2013:346:f2492.   CBC with platelets and differential   Result Value Ref Range    WBC Count 25.3 (H) 4.0 - 11.0 10e3/uL    RBC Count 4.80 4.40 - 5.90 10e6/uL    Hemoglobin 15.7 13.3 - 17.7 g/dL    Hematocrit 47.4 40.0 - 53.0 %    MCV 99 78 - 100 fL    MCH 32.7 26.5 - 33.0 pg    MCHC 33.1 31.5 - 36.5 g/dL    RDW 13.2 10.0 - 15.0 %    Platelet Count 235 150 - 450 10e3/uL    % Neutrophils 90 %    % Lymphocytes 5 %    % Monocytes 3 %    % Eosinophils 1 %    % Basophils 1 %    % Immature Granulocytes 1 %    NRBCs per 100 WBC 0 <1 /100    Absolute Neutrophils 22.8 (H) 1.6 - 8.3 10e3/uL    Absolute Lymphocytes 1.2 0.8 - 5.3 10e3/uL    Absolute Monocytes 0.8 0.0 - 1.3 10e3/uL    Absolute Eosinophils 0.1 0.0 - 0.7 10e3/uL    Absolute Basophils 0.1 0.0 - 0.2 10e3/uL     Absolute Immature Granulocytes 0.3 <=0.4 10e3/uL    Absolute NRBCs 0.0 10e3/uL   Blood gas venous   Result Value Ref Range    pH Venous 7.24 (L) 7.32 - 7.43    pCO2 Venous 52 (H) 40 - 50 mm Hg    pO2 Venous 40 25 - 47 mm Hg    Bicarbonate Venous 22 21 - 28 mmol/L    Base Excess/Deficit Venous -5.7 (L) -3.0 - 3.0 mmol/L    FIO2 6     Oxyhemoglobin Venous 66 (L) 70 - 75 %    O2 Sat, Venous 69.6 (L) 70.0 - 75.0 %    Narrative    In healthy individuals, oxyhemoglobin (O2Hb) and oxygen saturation (SO2) are approximately equal. In the presence of dyshemoglobins, oxyhemoglobin can be considerably lower than oxygen saturation.   EKG 12-lead, tracing only   Result Value Ref Range    Systolic Blood Pressure  mmHg    Diastolic Blood Pressure  mmHg    Ventricular Rate 96 BPM    Atrial Rate 96 BPM    OK Interval 168 ms    QRS Duration 98 ms     ms    QTc 487 ms    P Axis 46 degrees    R AXIS 16 degrees    T Axis 28 degrees    Interpretation ECG       Sinus rhythm  ST elevation consider lateral injury or acute infarct  QTcB >= 480 msec  ** ** ACUTE MI / STEMI ** **  Abnormal ECG  When compared with ECG of 08-Jun-2025 20:58, (unconfirmed)  No significant change was found         Medications   naloxone (NARCAN) injection 0.2 mg ( Intravenous See Alternative 6/8/25 2118)     Or   naloxone (NARCAN) injection 0.4 mg ( Intravenous See Alternative 6/8/25 2118)     Or   naloxone (NARCAN) injection 0.2 mg (0.2 mg Intramuscular $Given 6/8/25 2118)     Or   naloxone (NARCAN) injection 0.4 mg ( Intramuscular See Alternative 6/8/25 2118)   ondansetron (ZOFRAN) injection 4 mg (4 mg Intravenous $Given 6/8/25 2051)   aspirin (ASA) chewable tablet 324 mg (324 mg Oral $Given 6/8/25 2116)   ticagrelor (BRILINTA) tablet 180 mg (180 mg Oral $Given 6/8/25 2118)   heparin (porcine) inj 1000 units/mL SYR (rounds in 500 unit increments) (6,000 Units Intravenous $Given 6/8/25 2120)       Assessments & Plan (with Medical Decision Making)    54-year-old male with a history of chronic migraines and tobacco use who presents for evaluation after an overdose that responded to naloxone.  The patient is drowsy still and hypoxic with tachycardia on arrival heart rate around 100 with oxygen saturations of 84% on 2 L of oxygen via nasal cannula.  He is switched to an oxime mask at 10 L/min with improvement.  The patient is complaining of heartburn type pain and the initial EKG has some very subtle ST elevation in I and aVL concerning for ischemia.  I attempted to evaluate for other causes of his symptoms.  Symptoms do not seem typical for aortic dissection.  He has good pulses in all 4 extremities.  He is mildly tachycardic and hypoxic and I gave an additional dose of naloxone and he woke up more and had improvement in his breathing.  Chest x-rays obtained, images interpreted independently as well as radiology read reviewed, looks like he may have slight pulmonary edema possibly related to the naloxone he had downtime.  Bedside ultrasound shows overall fairly good cardiac function without pericardial effusion.  No signs of Takotsubo cardiomyopathy.  Repeat EKG shows worsening ST changes concerning for STEMI.  Therefore Cath Lab is activated.  The patient is given aspirin, ticagrelor, and heparin.  He is transferred to [facility] for cardiac intervention.    I have reviewed the nursing notes.    I have reviewed the findings, diagnosis, plan and need for follow up with the patient.      Critical care time (excluding teaching time and procedures): 30 minutes.           Discharge Medication List as of 6/8/2025  9:31 PM          Final diagnoses:   ST elevation myocardial infarction (STEMI), unspecified artery (H)   Opioid overdose, accidental or unintentional, initial encounter (H)       6/8/2025   [facility] EMERGENCY DEPT       Austin Farris MD  06/08/25 2402

## 2025-06-09 NOTE — CONSULTS
Psychology Consult replaced with psychiatry consult for further assistance. Psychiatry team is aware of consult change. Please contact us at [phone] with any questions or concerns.

## 2025-06-09 NOTE — CONSULTS
"     We have been asked to see Jossue Torres at [facility] by Dr. Savi Hernandez for management post acute coronary syndrome.    Impression and Plan     Assessment:  Acute lateral ST elevation myocardial infarction status post drug-eluting stenting x1 to the 1st obtuse marginal artery 6/8/2025. He denies angina.  New shortness of breath which may be a side effect of ticagrelor.   Acute hypoxic respiratory failure thought secondary to community acquired pneumonia. He is saturating adequately on room air currently.  Essential hypertension. Controlled.  Hyperlipidemia. Last LD 92 mg/dL.  Tobacco use.    Plan:  Switch ticagrelor to prasugrel with a 60 mg loading dose then 10 mg daily along with aspirin 81 mg daily for a total of 12 months (end date 6/8/2026).  Start metoprolol succinate 25 mg daily.  Resume home losartan when able  Rosuvastatin 40 mg daily  Cardiac rehabilitation evaluation.  He is not interested in quitting smoking  We will arrange for a general cardiology advanced practitioner visit in 2 weeks and a cardiologist visit in 3 months at [facility] which is near his home.  Okay to discharge home today from a cardiac perspective    Primary Cardiologist: He would like to establish with a cardiologist at [facility]    Clinically Significant Risk Factors Present on Admission          # Hyperchloremia: Highest Cl = 108 mmol/L in last 2 days, will monitor as appropriate              # Hypertension: Noted on problem list           # Overweight: Estimated body mass index is 27.43 kg/m  as calculated from the following:    Height as of an earlier encounter on 6/8/25: 1.803 m (5' 11\").    Weight as of this encounter: 89.2 kg (196 lb 11.2 oz).             History of Present Illness      Mr. Jossue Torres is a 54 year old male with a history of HTN and tobacco use who presented to [facility] ED yesterday after a drug overdose being found down at home. He woke up after being given " naloxone but complained of being short of breath, nauseous, dizzy and having heartburn. Hs-troponin was only minimally elevated but one of his ECGs showed lateral ST elevations so he was transferred to [facility] for emergency cardiac catheterization. He was found to have single vessel severe narrowing of OM1 successfully treated with a single REGGIE.    His CXR suggested pneumonia and he is receiving antibiotics. He denies any pain but has noticed a new shortness of breath since his procedure. No light headedness/dizziness, pre-syncope, syncope, lower extremity swelling, palpitations, paroxysmal nocturnal dyspnea (PND), or orthopnea.    Review of Systems:  Further review of systems is otherwise negative/noncontributory (based on review of medical record (admission H&P) and 13 point review of systems reviewed. Pertinent positives noted).    Cardiac Diagnostics     ECG 6/9/2025 (personally reviewed and interpreted): SR, normal ECG    Telemetry (personally reviewed): SR, no arrhythmias    Echocardiogram (personally reviewed and interpreted):   1. Left ventricular chamber size, wall thickness and systolic function are normal. The visually estimated left ventricular ejection fraction is 55-60%.  2. Right ventricular chamber size and systolic function are normal.  3. No hemodynamically significant valvular abnormalities.  4. No prior study available for comparison.    Cardiac Cath 6/8/2025 (results reviewed):   1) Lesion in OM1, likely ruptured plaque, S/P treatment with 2.5 X 18 mm Alvin REGGIE  2) No residual obstructive CAD    Medical History  Surgical History Family History Social History   Past Medical History:   Diagnosis Date    CAD (coronary artery disease)     Herpes zoster without mention of complication 01/01/1991    Hypertension      Past Surgical History:   Procedure Laterality Date    COLONOSCOPY N/A 12/23/2022    Procedure: COLONOSCOPY, FLEXIBLE, WITH LESION REMOVAL USING SNARE;  Surgeon: Gino Sinha,  MD;  Location: [facility]    CRANIOTOMY, DECOMPRESS NEUROVASCULAR, COMBINED Right 04/07/2022    Procedure: Right microvascular decompression  Latex Free;  Surgeon: Grey Simeon MD;  Location: [facility] OR    HERNIA REPAIR  Baby    IRRIGATION AND DEBRIDEMENT CRANIUM Right 05/06/2022    Procedure: Retro-Mastoid wound washout;  Surgeon: Grey Simeon MD;  Location: [facility] OR    PICC INSERTION - DOUBLE LUMEN Right 05/10/2022    right basilic 5 fr dl picc 44 cm    SURGICAL HISTORY OF -   1969    right inguinal hernioplasty    SURGICAL HISTORY OF -   11/16/1993    lysis of two penile adhesions and cauterization of penile condyloma.     Family History   Problem Relation Age of Onset    Hypertension Father     Hypertension Brother     C.A.D. Maternal Grandmother         40's    C.A.D. Maternal Aunt         40's    C.A.D. Maternal Uncle         40's    Anesthesia Reaction No family hx of     Deep Vein Thrombosis (DVT) No family hx of            Social History     Socioeconomic History    Marital status: Single     Spouse name: Not on file    Number of children: Not on file    Years of education: Not on file    Highest education level: Not on file   Occupational History    Not on file   Tobacco Use    Smoking status: Every Day     Current packs/day: 0.50     Average packs/day: 0.5 packs/day for 15.0 years (7.5 ttl pk-yrs)     Types: Cigarettes    Smokeless tobacco: Former     Types: Chew   Vaping Use    Vaping status: Never Used   Substance and Sexual Activity    Alcohol use: Yes     Comment: rare    Drug use: No    Sexual activity: Not Currently     Partners: Female   Other Topics Concern    Parent/sibling w/ CABG, MI or angioplasty before 65F 55M? No   Social History Narrative    Not on file     Social Drivers of Health     Financial Resource Strain: Low Risk  (12/16/2024)    Financial Resource Strain     Within the past 12 months, have you or your family members you live with been unable to get utilities (heat,  electricity) when it was really needed?: No   Food Insecurity: Low Risk  (12/16/2024)    Food Insecurity     Within the past 12 months, did you worry that your food would run out before you got money to buy more?: No     Within the past 12 months, did the food you bought just not last and you didn t have money to get more?: No   Transportation Needs: Low Risk  (12/16/2024)    Transportation Needs     Within the past 12 months, has lack of transportation kept you from medical appointments, getting your medicines, non-medical meetings or appointments, work, or from getting things that you need?: No   Physical Activity: Inactive (12/16/2024)    Exercise Vital Sign     Days of Exercise per Week: 0 days     Minutes of Exercise per Session: 0 min   Stress: Stress Concern Present (12/16/2024)    [place] [employer] - Occupational Stress Questionnaire     Feeling of Stress : To some extent   Social Connections: Unknown (12/16/2024)    Social Connection and Isolation Panel [NHANES]     Frequency of Communication with Friends and Family: Not on file     Frequency of Social Gatherings with Friends and Family: Three times a week     Attends Baptism Services: Not on file     Active Member of Clubs or Organizations: Not on file     Attends Club or Organization Meetings: Not on file     Marital Status: Not on file   Interpersonal Safety: Low Risk  (12/16/2024)    Interpersonal Safety     Do you feel physically and emotionally safe where you currently live?: Yes     Within the past 12 months, have you been hit, slapped, kicked or otherwise physically hurt by someone?: No     Within the past 12 months, have you been humiliated or emotionally abused in other ways by your partner or ex-partner?: No   Housing Stability: Low Risk  (12/16/2024)    Housing Stability     Do you have housing? : Yes     Are you worried about losing your housing?: No             Physical Examination   VITALS: /61   Pulse 90    Temp 98.9  F (37.2  C) (Oral)   Resp 16   Wt 89.2 kg (196 lb 11.2 oz)   SpO2 94%   BMI 27.43 kg/m    BMI: Body mass index is 27.43 kg/m .  Wt Readings from Last 3 Encounters:   06/08/25 89.2 kg (196 lb 11.2 oz)   06/08/25 88.5 kg (195 lb)   12/16/24 88.5 kg (195 lb)         Intake/Output Summary (Last 24 hours) at 6/9/2025 1026  Last data filed at 6/9/2025 0600  Gross per 24 hour   Intake 1202.5 ml   Output 700 ml   Net 502.5 ml       General Appearance:  Alert, cooperative, no distress, appears stated age    Head:  Normocephalic, without obvious abnormality, atraumatic   Eyes:  PERRL, conjunctiva/corneas clear, EOM's intact   Ears:  Normal external ear canals bilaterally   Nose: Nares normal, septum midline, no drainage   Throat: Lips, mucosa, and tongue normal; teeth and gums normal   Neck: Supple, symmetrical, trachea midline, no adenopathy, no carotid bruit or JVD    Back:   Symmetric, no abnormal curvature, ROM normal   Lungs:   Clear to auscultation bilaterally, respirations unlabored   Chest Wall:  No tenderness or deformity   Heart:  Regular rate and rhythm , S1, S2 normal,no murmur, rub or gallop   Abdomen:   Soft, non-tender, bowel sounds active all four quadrants,  no masses, no organomegaly   Extremities: Extremities normal, atraumatic, no cyanosis or edema. Left radial artery cath access site    Skin: Skin color, texture, turgor normal, no rashes or lesions   Psychiatric: Normal affect, pleasant and cooperative    Neurologic: Alert and oriented X 3, Moves all 4 extremities            Imaging      CXR 6/8/2025 (results reviewed):  There appears to be some subtle patchy and nodular infiltrate in the lower right lung suspicious for an infectious/inflammatory pneumonitis.   Aspiration is possible.   Mild interstitial prominence suggests mild edema.   No pleural effusion or pneumothorax.   Normal heart size.     Lab Results    Chemistry/lipid CBC Cardiac Enzymes/BNP/TSH/INR   Recent Labs   Lab Test  06/08/25  2332   CHOL 152   HDL 50   LDL 92   TRIG 49     Recent Labs   Lab Test 06/08/25  2332 12/16/24  1651 12/15/23  1537   LDL 92 109* 96     Recent Labs   Lab Test 06/09/25 0436      POTASSIUM 5.2   CHLORIDE 108*   CO2 24   *   BUN 15.3   CR 1.48*   GFRESTIMATED 56*   ANN MARIE 8.2*     Recent Labs   Lab Test 06/09/25 0436 06/08/25 2111 12/16/24  1651   CR 1.48* 1.60* 1.26*     Recent Labs   Lab Test 06/08/25  2332   A1C 5.6          Recent Labs   Lab Test 06/08/25 2111   WBC 25.3*   HGB 15.7   HCT 47.4   MCV 99        Recent Labs   Lab Test 06/08/25 2111 05/31/22  1343 05/18/22  1336   HGB 15.7 15.2 15.9      Recent Labs   Lab Test 05/06/22  1819   INR 0.94           Current Inpatient Scheduled Medications   Scheduled Meds:  Current Facility-Administered Medications   Medication Dose Route Frequency Provider Last Rate Last Admin    aspirin EC tablet 81 mg  81 mg Oral Daily Savi Hernandez MD   81 mg at 06/09/25 0830    doxycycline (VIBRAMYCIN) 100 mg vial to attach to  mL bag  100 mg Intravenous Q12H Mary Ellen Real MD   100 mg at 06/09/25 0022    metoprolol tartrate (LOPRESSOR) half-tab 12.5 mg  12.5 mg Oral BID Savi Hernandez MD        piperacillin-tazobactam (ZOSYN) 3.375 g vial to attach to  mL bag  3.375 g Intravenous Q6H Mary Ellen Real MD   3.375 g at 06/09/25 0527    rosuvastatin (CRESTOR) tablet 40 mg  40 mg Oral Daily Savi Hernandez MD   40 mg at 06/09/25 0829    ticagrelor (BRILINTA) tablet 90 mg  90 mg Oral Q12H Savi Hernandez MD   90 mg at 06/09/25 0830     Continuous Infusions:  Current Facility-Administered Medications   Medication Dose Route Frequency Provider Last Rate Last Admin    lactated ringers infusion   Intravenous Continuous Mary Ellen Real MD 50 mL/hr at 06/09/25 0723 Rate Verify at 06/09/25 0723    Percutaneous Coronary Intervention orders placed (this is information for BPA alerting)   Does not apply DOES NOT GO TO MAR  Savi Hernandez MD           Current Outpatient Medications   Medication Sig Dispense Refill    aspirin 81 MG EC tablet Take 1 tablet (81 mg) by mouth daily. Start tomorrow. 30 tablet 3    rosuvastatin (CRESTOR) 40 MG tablet Take 1 tablet (40 mg) by mouth daily. 90 tablet 3    ticagrelor (BRILINTA) 90 MG tablet Take 1 tablet (90 mg) by mouth 2 times daily. Dose to start tomorrow morning. 180 tablet 3          Medications Prior to Admission   Prior to Admission medications    Medication Sig Start Date End Date Taking? Authorizing Provider   acetaminophen (TYLENOL) 500 MG tablet Take 500-1,000 mg by mouth every 6 hours as needed for mild pain.   Yes Unknown, Entered By History   aspirin 81 MG EC tablet Take 1 tablet (81 mg) by mouth daily. Start tomorrow. 6/9/25  Yes Savi Hernandez MD   aspirin-acetaminophen-caffeine (EXCEDRIN MIGRAINE) 250-250-65 MG tablet Take 1 tablet by mouth every 8 hours as needed for headaches.   Yes Unknown, Entered By History   eletriptan (RELPAX) 40 MG tablet Take 1 tablet by mouth. repeat after 2 hours if needed. 11/25/24  Yes Tamia Yeh APRN CNP   galcanezumab-gnlm (EMGALITY) 120 MG/ML injection INJECT 120MG UNDER THE SKIN EVERY 28 DAYS 12/19/24  Yes Mandy Thomas APRN CNP   losartan (COZAAR) 100 MG tablet Take 1 tablet (100 mg) by mouth daily. 12/16/24  Yes Tamia Yeh APRN CNP   rosuvastatin (CRESTOR) 40 MG tablet Take 1 tablet (40 mg) by mouth daily. 6/8/25  Yes Savi Hernandez MD   simvastatin (ZOCOR) 40 MG tablet Take 40 mg by mouth daily.   Yes Unknown, Entered By History   ticagrelor (BRILINTA) 90 MG tablet Take 1 tablet (90 mg) by mouth 2 times daily. Dose to start tomorrow morning. 6/9/25  Yes Savi Hernandez MD   OXcarbazepine (TRILEPTAL) 300 MG tablet Take 2 tabs in the morning and 1 tab at night. 4/7/22 5/11/22  Tamia Yeh APRN CNP [facility], MD [facility]  Non-Invasive Cardiologist  [facility]  Heart Care  Pager [phone]

## 2025-06-09 NOTE — PRE-PROCEDURE
GENERAL PRE-PROCEDURE:   Procedure:  Coronary angiogram  Date/Time:  6/8/2025 10:00 PM    Verbal consent obtained?: Yes    Written consent obtained?: No    Emergent situation    Risks and benefits: Risks, benefits and alternatives were discussed    Consent given by:  Patient  Patient states understanding of procedure being performed: Yes    Patient's understanding of procedure matches consent: Yes    Procedure consent matches procedure scheduled: Yes    Expected level of sedation:  Moderate  Appropriately NPO:  Yes  ASA Class:  3  Mallampati  :  Grade 2- soft palate, base of uvula, tonsillar pillars, and portion of posterior pharyngeal wall visible  Lungs:  Lungs clear with good breath sounds bilaterally  Heart:  Normal heart sounds and rate  History & Physical reviewed:  History and physical reviewed and no updates needed  Statement of review:  I have reviewed the lab findings, diagnostic data, medications, and the plan for sedation

## 2025-06-09 NOTE — PLAN OF CARE
Goal Outcome Evaluation:      Plan of Care Reviewed With: patient, family    Overall Patient Progress: improvingOverall Patient Progress: improving    Outcome Evaluation: VSS, Off O2 sats in the 90s, and calls appropriately.  [facility] - ICU    RN Progress Note:            Pertinent Assessments:      Please refer to flowsheet rows for full assessment     Pt is alert and oriented x4, follow commands, denies pain, and calls appropriately. On room air and sats in 90s. Pt did endorse nausea without emesis prn zofran with good effect. Ambulated nursing unit  with cardiac rehab and with RN and tolerated well.           Key Events - This Shift:       -Transfer order: cardiac tel.  -Overnight oximetry study to r/o KIRSTEN.  -ECHO; EF 55-60%   -Possible discharge tomorrow.       RN Managed Protocols Ordered:  N/A  Protocols:N/A  PRN'S:N/A  Protocols Status: N/A                Barriers to Discharge / Downgrade:     NONE         Point of Contact Update: YES-OR-NO: Yes  If No, reason: Brother   Name:  Phone Number:  Summary of Conversation: POC.     Problem: Adult Inpatient Plan of Care  Goal: Optimal Comfort and Wellbeing  Outcome: Progressing  Intervention: Provide Person-Centered Care  Recent Flowsheet Documentation  Taken 6/9/2025 1600 by Herbert Thomas RN  Trust Relationship/Rapport:   care explained   choices provided   questions answered   questions encouraged  Taken 6/9/2025 1200 by Herbert Thomas RN  Trust Relationship/Rapport:   care explained   choices provided   emotional support provided   empathic listening provided   questions answered   questions encouraged   reassurance provided   thoughts/feelings acknowledged  Taken 6/9/2025 0800 by Herbert Thomas, RN  Trust Relationship/Rapport:   care explained   choices provided   emotional support provided   empathic listening provided   questions answered   questions encouraged   reassurance provided   thoughts/feelings acknowledged      Problem: Risk for Delirium  Goal: Improved Behavioral Control  Intervention: Minimize Safety Risk  Recent Flowsheet Documentation  Taken 6/9/2025 1600 by Herbert Thomas RN  Trust Relationship/Rapport:   care explained   choices provided   questions answered   questions encouraged  Taken 6/9/2025 1200 by Herbert Thomas RN  Trust Relationship/Rapport:   care explained   choices provided   emotional support provided   empathic listening provided   questions answered   questions encouraged   reassurance provided   thoughts/feelings acknowledged  Taken 6/9/2025 0800 by Herbert Thomas, RN  Trust Relationship/Rapport:   care explained   choices provided   emotional support provided   empathic listening provided   questions answered   questions encouraged   reassurance provided   thoughts/feelings acknowledged

## 2025-06-09 NOTE — PLAN OF CARE
Goal Outcome Evaluation:    [facility] - ICU    RN Progress Note:            Pertinent Assessments:      Please refer to flowsheet rows for full assessment     2 NC 6 l/min, patient voided 700 ml post procedure, patient tolerated oral intake, on IV fluids, antibiotics started for pneumonia.           Key Events - This Shift:       Uneventful, Triptan 40 mg. (home medication)  given once for migraine after approval from the Hospitalist and the pharmacy.  TR band has removed per order, totally removed @ 3 AM. No hematoma, wilian CMS, normal radial pulses, no bleeding noticed.     RN Managed Protocols Ordered:  No  Protocols:-  PRN'S:-  Protocols Status: N/A                Barriers to Discharge / Downgrade:     Might be downgraded            Plan of Care Reviewed With: patient    Overall Patient Progress: improvingOverall Patient Progress: improving    Outcome Evaluation: 2 NC 6 l/min, patient voided 700 ml post procedure, patient tolerated oral intake, on IV fluids, antibiotics started for pnumonia.

## 2025-06-09 NOTE — H&P
[facility]    History and Physical - Hospitalist Service       Date of Admission:  6/8/2025    Assessment & Plan      Jossue Torres is a 54 year old male with a medical history of hypertension, hyperlipidemia, migraines, nicotine use and other medical problems including the loss of his son yesterday from a car crush who is being admitted with STEMI after he was found unresponsive  today due to opioid overdose and STEMI      Patient Active Problem List   Diagnosis    Chronic migraine without aura without status migrainosus, not intractable    Esophageal reflux    Tobacco use disorder    CARDIOVASCULAR SCREENING; LDL GOAL LESS THAN 160    Hypertension goal BP (blood pressure) < 140/90    Anxiety    Trigeminal neuralgia    Status post craniotomy    Postoperative wound infection    ST elevation MI (STEMI) (H)      #STEMI  -S/p angioram  with stent placement in the OM branch of the left circumflex.   - Continue aspirin, metoprolol, statin, Brilinta  -Appreciate cardiology input  -LDL goal less than 70  -Check A1c  -Smoking cessation addressed  -Patient is not ready to quit  -Emotional support given regarding loss of son  -Offered psychotherapy-not interested at this time  -Check TTE  -He has a primary care physician and will follow up if needed      #Change in mental status  -Was due to abuse of her white powder which may or may not be opioids  -Check urine tox screen  -He was unable to tell me what he was  - He reportedly responded to Narcan    #Opioid abuse/overdose  -Reportedly this is a one-time thing done in the face of acute distress  -He denies suicidal ideation or attempt  -Outpatient follow-up      # Acute distress with bereavement  -Emotional support given  -Not interested in therapy at this time  -23 year old son in a car crash the day prior to admission    #Acute renal failure  -Gentle hydration  -Avoid nephrotoxic drugs  -Follow-up on urine output    #Hypertension  -Continue  "metoprolol  - Controlled    Hyperlipidemia.   -Continue home medication  -Statin    #Nicotine use  -Replacement therapy as  -Not interested in quitting at this time  -This will require follow-up as outpatient    # Major depression  #Anxiety  -Emotional support  -Not interested in therapy      #Migraines  - Maxalt as needed  #Rest of patient's medical conditions management remains unchanged      #1 Drug overdose   #2 ECG changes - lesion in small OM treated with 2.5 X 18 mm Alvin REGGIE with no other significant disease   #3 History of HTN  #4 History of hyperlipidemia  #5 Nicotine dependence   #6 Acute stress with loss of     Diet:  Cardiac  DVT Prophylaxis: Pneumatic Compression Devices  Thompson Catheter: Not present  Lines: None     Cardiac Monitoring: None  Code Status:  Full    Clinically Significant Risk Factors Present on Admission                   # Hypertension: Noted on problem list           # Overweight: Estimated body mass index is 27.2 kg/m  as calculated from the following:    Height as of an earlier encounter on 6/8/25: 1.803 m (5' 11\").    Weight as of an earlier encounter on 6/8/25: 88.5 kg (195 lb).              Disposition Plan     Medically Ready for Discharge: Anticipated in 2-4 Days           Mary Ellen Real MD  Hospitalist Service  [facility]  Securely message with [employer] (more info)  Text page via [employer] Paging/Directory     ______________________________________________________________________    Chief Complaint   S/p STEMI        History of Present Illness   Jossue Torres is a 54 year old male with a medical history of hypertension, hyperlipidemia, migraines, nicotine use and other medical problems including the loss of his son yesterday from a car crush who is being admitted with STEMI after he was found unresponsive  today due to opioid overdose and STEMI    Patient has a 23-year-old son, his only son who was hit by a car yesterday and reportedly killed.  Patient has " since spiraled into depression and today took an overdose of opioids.  He was found unresponsive after his brother called a wellness check on him.  He reportedly responded twice to Narcan and was found to have a white powder by him.    Patient was sent to [facility] for further evaluation.  Preliminary workup done showed an EKG with ST elevation in lead I with depression in 3 and aVF.  Patient was sent to [facility] for STEMI management.    Patient underwent an angiogram and had 1 stent placed to the OM branch.  There was not much coronary artery disease.    Hospitalist was called postprocedure to admit patient to the ICU.    Laboratory workup done showed a BMP with a BUN of 15.2 and a creatinine of 1.6 which was new from her baseline of 1.2-1.3.  Glucose was 295 initial troponin was 24, VBG showed a pH of 7.24 with a pCO2 of 52, white count of 25.3 with neutrophilia.  Dimer was 2.68.  Patient has been started on aspirin, Brilinta metoprolol, statin for his coronary artery disease.    He he was also started on Zosyn for presumed aspiration pneumonia and doxycycline was added for atypical coverage for possible community-acquired pneumonia.  Patient is a full code      Patient denied suicidal attempt when seen.  He reported that there was a whitish powder in his son's room and he wanted to try it out to see what it was as his son used to use it.  Condolences were expressed for his loss.  I asked him if he was interested in talking to a therapist and he said no        Past Medical History    Past Medical History:   Diagnosis Date    Herpes zoster without mention of complication 01/01/1991    Hypertension        Past Surgical History   Past Surgical History:   Procedure Laterality Date    COLONOSCOPY N/A 12/23/2022    Procedure: COLONOSCOPY, FLEXIBLE, WITH LESION REMOVAL USING SNARE;  Surgeon: Gino Sinha MD;  Location: [facility]    CRANIOTOMY, DECOMPRESS NEUROVASCULAR, COMBINED Right 04/07/2022    Procedure: Right  microvascular decompression  Latex Free;  Surgeon: Grey Simeon MD;  Location: [facility]    HERNIA REPAIR  Baby    IRRIGATION AND DEBRIDEMENT CRANIUM Right 05/06/2022    Procedure: Retro-Mastoid wound washout;  Surgeon: Grey Smieon MD;  Location: [facility]    PICC INSERTION - DOUBLE LUMEN Right 05/10/2022    right basilic 5 fr dl picc 44 cm    SURGICAL HISTORY OF -   1969    right inguinal hernioplasty    SURGICAL HISTORY OF -   11/16/1993    lysis of two penile adhesions and cauterization of penile condyloma.       Prior to Admission Medications   Prior to Admission Medications   Prescriptions Last Dose Informant Patient Reported? Taking?   acetaminophen (TYLENOL) 650 MG CR tablet   Yes No   Sig: Take 1,300 mg by mouth every 8 hours as needed for mild pain or fever. 3 TIMES DAILY   amLODIPine (NORVASC) 5 MG tablet   No No   Sig: Take 1 tablet (5 mg) by mouth daily.   eletriptan (RELPAX) 40 MG tablet   No No   Sig: Take 1 tablet by mouth. repeat after 2 hours if needed.   galcanezumab-gnlm (EMGALITY) 120 MG/ML injection   No No   Sig: INJECT 120MG UNDER THE SKIN EVERY 28 DAYS   losartan (COZAAR) 100 MG tablet   No No   Sig: Take 1 tablet (100 mg) by mouth daily.   simvastatin (ZOCOR) 40 MG tablet   No No   Sig: Take 1 tablet (40 mg) by mouth at bedtime.      Facility-Administered Medications: None        Review of Systems    The 10 point Review of Systems is negative other than noted in the HPI or here.     Social History   I have reviewed this patient's social history and updated it with pertinent information if needed.  Social History     Tobacco Use    Smoking status: Every Day     Current packs/day: 0.50     Average packs/day: 0.5 packs/day for 15.0 years (7.5 ttl pk-yrs)     Types: Cigarettes    Smokeless tobacco: Former     Types: Chew   Vaping Use    Vaping status: Never Used   Substance Use Topics    Alcohol use: Yes     Comment: rare    Drug use: No         Family History   I have  reviewed this patient's family history and updated it with pertinent information if needed.  Family History   Problem Relation Age of Onset    Hypertension Father     Hypertension Brother     C.A.D. Maternal Grandmother         40's    C.A.D. Maternal Aunt         40's    C.A.D. Maternal Uncle         40's    Anesthesia Reaction No family hx of     Deep Vein Thrombosis (DVT) No family hx of          Allergies   Allergies   Allergen Reactions    Nkda [No Known Drug Allergy]         Physical Exam   Vital Signs:               O2 Device: Oxymask Oxygen Delivery: 10 LPM  Weight: 0 lbs 0 oz      General Aox3, appropriate affect, NAD, on RA  HEENT  MMM, EOMI, PERRL  Chest Adeq E b/l, No wheezing  Heart RRR, No M/R/G  Abd- Soft, NT, BS+  - Deferred,   Extremity- Moving all extremities, No digital clubbing,   No edema  Neuro- Aox3, grossly non focal, gait not checked  Skin  Has no tattoo, No skin rash   No bleeding from angiogram site    Medical Decision Making       85 MINUTES SPENT BY ME on the date of service doing chart review, history, exam, documentation & further activities per the note.      ------------------ MEDICAL DECISION MAKING ------------------------------------------------------------------------------------------------------  MANAGEMENT DISCUSSED with the following over the past 24 hours: patient       Data   ------------------------- PAST 24 HR DATA REVIEWED -----------------------------------------------    I have personally reviewed the following data over the past 24 hrs:    25.3 (H)  \   15.7   / 235     138 102 15.2 /  295 (H)   4.6 20 (L) 1.60 (H) \     Trop: 24 (H) BNP: N/A     INR:  N/A PTT:  N/A   D-dimer:  2.68 (H) Fibrinogen:  N/A       Imaging results reviewed over the past 24 hrs:   Recent Results (from the past 24 hours)   POC US ECHO LIMITED    [facility] Procedure Note      Limited Bedside ED Cardiac Ultrasound:    PROCEDURE: PERFORMED BY: Dr. Austin Farris,  MD  INDICATIONS/SYMPTOM:  Chest Pain  PROBE: Cardiac phased array probe  BODY LOCATION: Chest  FINDINGS:   The ultrasound was performed utilizing the parasternal long axis, parasternal short axis, and apical 4 chamber views.  Cardiac contractility:  Present  Gross estimation of cardiac kinesis: normal  Pericardial Effusion:  None  RV:LV ratio: LV > RV  INTERPRETATION:    Chamber size and motion were grossly normal with LV > RV, normal cardiac kinesis.  No pericardial effusion was found.  IMAGE DOCUMENTATION: Images were archived to PACs system.        XR Chest Port 1 View    Narrative    EXAM: XR CHEST PORT 1 VIEW  LOCATION: [facility]  DATE: 6/8/2025    INDICATION: overdose, hypoxia, tachycardia  COMPARISON: None.      Impression    IMPRESSION: There appears to be some subtle patchy and nodular infiltrate in the lower right lung suspicious for an infectious/inflammatory pneumonitis. Aspiration is possible. Mild interstitial prominence suggests mild edema. No pleural effusion or   pneumothorax. Normal heart size.   Cardiac Catheterization    Narrative    1) Lesion in OM1, likely ruptured plaque, S/P treatment with 2.5 X 18 mm   [place] REGGIE  2) No residual obstructive CAD

## 2025-06-09 NOTE — PHARMACY-ADMISSION MEDICATION HISTORY
Pharmacist Admission Medication History    Admission medication history is complete. The information provided in this note is only as accurate as the sources available at the time of the update.    Information Source(s): Patient and CareEverywhere/SureScripts via in-person    Pertinent Information: none    Changes made to PTA medication list:  Added: Excedrin   Deleted: amlodipine, remains off hctz  Changed: None    Allergies reviewed with patient and updates made in EHR: yes    Medication History Completed By: Jacqueline Salmon RPH 6/8/2025 11:28 PM    PTA Med List   Medication Sig Last Dose/Taking    acetaminophen (TYLENOL) 500 MG tablet Take 500-1,000 mg by mouth every 6 hours as needed for mild pain. Unknown    aspirin-acetaminophen-caffeine (EXCEDRIN MIGRAINE) 250-250-65 MG tablet Take 1 tablet by mouth every 8 hours as needed for headaches. Unknown    eletriptan (RELPAX) 40 MG tablet Take 1 tablet by mouth. repeat after 2 hours if needed. Unknown    galcanezumab-gnlm (EMGALITY) 120 MG/ML injection INJECT 120MG UNDER THE SKIN EVERY 28 DAYS Past Week    losartan (COZAAR) 100 MG tablet Take 1 tablet (100 mg) by mouth daily. 6/8/2025 Morning    simvastatin (ZOCOR) 40 MG tablet Take 40 mg by mouth daily. 6/8/2025 Morning

## 2025-06-09 NOTE — CONSULTS
NUTRITION EDUCATION      REASON :  Provider order for heart healthy diet education    NUTRITION HISTORY:  Patient eats a regular diet.  He eats red meat, likes vegetables.  Patient has increased his intake of water to 3x 16 oz bottles/day and decreased his intake of Mountain dew from 6 cans to 3 cans/day.     NUTRITION DIAGNOSIS:  Food- and nutrition-related knowledge deficit R/t heart disease    INTERVENTIONS:    Nutrition Prescription:  Heart healthy:  Low fat, Low sodium.    Implementation:      *  Nutrition Education (Content):   A)  Provided handout Heart Healthy Nutrition Therapy, eat Right with My Plate.   B)  Discussed Building a heart healthy meal with up to 1/2 plate vegetables, 1/4 plate meat/protein, 1/4 plate whole grain, low sugar beverage.       *  Nutrition Education (Application):   A)  Discussed current eating habits and recommended alternative food choices      *  Anticipate compliance      *  Diet Education - refer to Education Flowsheet    Goals:      *  Patient participated in diet discussion and plans to continue with drinking 3 or more bottles of water/day.       *  All of the above goals met during the education session    Follow Up/Monitoring:      *  Provided RD contact information for future questions      *  Further Diet education opportunities in the cardiac rehab outpatient program.

## 2025-06-09 NOTE — PROGRESS NOTES
06/09/25 0745   Appointment Info   Signing Clinician's Name / Credentials (OT) [place] Moura CK OTR/L CLT   Living Environment   People in Home alone   Current Living Arrangements house   Home Accessibility stairs to enter home   Number of Stairs, Main Entrance 3   Self-Care   Usual Activity Tolerance good   Current Activity Tolerance good   Regular Exercise No   Equipment Currently Used at Home none   Fall history within last six months no   Activity/Exercise/Self-Care Comment Ind. w/ ADL routine   Instrumental Activities of Daily Living (IADL)   IADL Comments Ind. w/ IADL routine at baseline   General Information   Onset of Illness/Injury or Date of Surgery 06/08/25   Referring Physician Dr. Hernandez   Patient/Family Therapy Goal Statement (OT) to get home   Additional Occupational Profile Info/Pertinent History of Current Problem indio Torres is a 54 year old male with a medical history of hypertension, hyperlipidemia, migraines, nicotine use and other medical problems including the loss of his son yesterday from a car crush who is being admitted with STEMI after he was found unresponsive  today due to opioid overdose and STEMI   Existing Precautions/Restrictions cardiac   Cognitive Status Examination   Orientation Status orientation to person, place and time   Affect/Mental Status (Cognitive) WNL   Follows Commands WNL   Bed Mobility   Bed Mobility No deficits identified   Transfers   Transfers No deficits identified   Activities of Daily Living   BADL Assessment/Intervention lower body dressing   Lower Body Dressing Assessment/Training   [place] Level (Lower Body Dressing) socks;independent   Clinical Impression   Criteria for Skilled Therapeutic Interventions Met (OT) Yes, treatment indicated   OT Diagnosis decreased act tolerance   OT Problem List-Impairments impacting ADL problems related to;activity tolerance impaired;balance;mobility;strength;post-surgical precautions   Assessment of Occupational  Performance 1-3 Performance Deficits   Identified Performance Deficits endurance/act. tolerance, trnf safety, prec. following angio   Planned Therapy Interventions (OT) ADL retraining;transfer training;strengthening;progressive activity/exercise;home program guidelines   Clinical Decision Making Complexity (OT) problem focused assessment/low complexity   Risk & Benefits of therapy have been explained evaluation/treatment results reviewed;risks/benefits reviewed;patient   OT Total Evaluation Time   OT Eval, Low Complexity Minutes (59797) 10   OT Goals   Therapy Frequency (OT) Daily   OT Goals Cardiac Phase 1   OT: Understanding of cardiac education to maximize quality of life, condition management, and health outcomes Patient;Verbalize;Demonstrate   OT: Perform aerobic activity with stable cardiovascular response continuous;ambulation;20 minutes   OT: Functional/aerobic ambulation tolerance with stable cardiovascular response in order to return to home and community environment Modified independent;Greater than 300 feet   OT: Navigation of stairs simulating home set up with stable cardiovascular response in order to return to home and community environment Modified independent;Greater than 10 stairs   Interventions   Interventions Quick Adds Therapeutic Procedures/Exercise;Self-Care/Home Management;Cardiac Rehab   Self-Care/Home Management   Self-Care/Home Mgmt/ADL, Compensatory, Meal Prep Minutes (54247) 8   Treatment Detail/Skilled Intervention Education on angio precautions- limited use of wrist movement for 3 days. Additionally discussed MI precautions- limit strenous activity, deferred return to return questions to cardiology. Patient completed additional trsfs, toileting tasks with supervision due to some light dizziness.   Therapeutic Procedures/Exercise   Therapeutic Procedure: strength, endurance, ROM, flexibillity minutes (74085) 8   Treatment Detail/Skilled Intervention see amb   Ambulation   Workload 235    Symptoms Lightheadedness   Cardiovascular Response Normal   Exercise Details SBA with IV pole   Vital Signs Details Pre , Post    OT Discharge Planning   OT Plan stairs, HEP   OT Discharge Recommendation (DC Rec) home with outpatient cardiac rehab   OT Rationale for DC Rec Patient I with mobility and trsfs, CR education will be provided   OT Brief overview of current status SBA/I   OT Total Distance Amb During Session (feet) 250   Total Session Time   Timed Code Treatment Minutes 16   Total Session Time (sum of timed and untimed services) 26
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
## 2025-06-09 NOTE — PROGRESS NOTES
[facility]       Dual Skin Assessment:     Patient admitted from [facility] to ICU.      Comprehensive skin inspection completed by myself and Janine AKERS RN.     Abnormal skin assessment findings: Yes      If yes above, LDA initiated for skin breakdown/non-blanchable erythema:  Yes     Provider notified: No, Endorsed to next shift.     WOC consult order obtained: No, Endorsed to next shift.
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
## 2025-06-09 NOTE — CONSULTS
"Consult acknowledged. Patient declined meeting with writer. Patient stated \"that is what my family is for.\" Writer provided update to RN on team. Consult will be closed.   "
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
## 2025-06-10 ENCOUNTER — RESULTS FOLLOW-UP (OUTPATIENT)
Dept: CARDIOLOGY | Facility: CLINIC | Age: 55
End: 2025-06-10

## 2025-06-10 VITALS
SYSTOLIC BLOOD PRESSURE: 146 MMHG | HEART RATE: 75 BPM | RESPIRATION RATE: 18 BRPM | BODY MASS INDEX: 28.12 KG/M2 | DIASTOLIC BLOOD PRESSURE: 82 MMHG | TEMPERATURE: 97.7 F | OXYGEN SATURATION: 97 % | WEIGHT: 201.6 LBS

## 2025-06-10 LAB
ANION GAP SERPL CALCULATED.3IONS-SCNC: 8 MMOL/L (ref 7–15)
ATRIAL RATE - MUSE: 96 BPM
ATRIAL RATE - MUSE: 98 BPM
BUN SERPL-MCNC: 17.4 MG/DL (ref 6–20)
CALCIUM SERPL-MCNC: 8.6 MG/DL (ref 8.8–10.4)
CHLORIDE SERPL-SCNC: 107 MMOL/L (ref 98–107)
CREAT SERPL-MCNC: 1.45 MG/DL (ref 0.67–1.17)
DIASTOLIC BLOOD PRESSURE - MUSE: NORMAL MMHG
DIASTOLIC BLOOD PRESSURE - MUSE: NORMAL MMHG
EGFRCR SERPLBLD CKD-EPI 2021: 57 ML/MIN/1.73M2
ERYTHROCYTE [DISTWIDTH] IN BLOOD BY AUTOMATED COUNT: 13.6 % (ref 10–15)
GLUCOSE SERPL-MCNC: 97 MG/DL (ref 70–99)
HCO3 SERPL-SCNC: 24 MMOL/L (ref 22–29)
HCT VFR BLD AUTO: 40.6 % (ref 40–53)
HGB BLD-MCNC: 13.3 G/DL (ref 13.3–17.7)
INTERPRETATION ECG - MUSE: NORMAL
INTERPRETATION ECG - MUSE: NORMAL
MCH RBC QN AUTO: 32 PG (ref 26.5–33)
MCHC RBC AUTO-ENTMCNC: 32.8 G/DL (ref 31.5–36.5)
MCV RBC AUTO: 98 FL (ref 78–100)
P AXIS - MUSE: 43 DEGREES
P AXIS - MUSE: 46 DEGREES
PLATELET # BLD AUTO: 170 10E3/UL (ref 150–450)
POTASSIUM SERPL-SCNC: 4.2 MMOL/L (ref 3.4–5.3)
PR INTERVAL - MUSE: 168 MS
PR INTERVAL - MUSE: 170 MS
QRS DURATION - MUSE: 98 MS
QRS DURATION - MUSE: 98 MS
QT - MUSE: 386 MS
QT - MUSE: 386 MS
QTC - MUSE: 487 MS
QTC - MUSE: 492 MS
R AXIS - MUSE: 16 DEGREES
R AXIS - MUSE: 42 DEGREES
RBC # BLD AUTO: 4.16 10E6/UL (ref 4.4–5.9)
SODIUM SERPL-SCNC: 139 MMOL/L (ref 135–145)
SYSTOLIC BLOOD PRESSURE - MUSE: NORMAL MMHG
SYSTOLIC BLOOD PRESSURE - MUSE: NORMAL MMHG
T AXIS - MUSE: 28 DEGREES
T AXIS - MUSE: 49 DEGREES
VENTRICULAR RATE- MUSE: 96 BPM
VENTRICULAR RATE- MUSE: 98 BPM
WBC # BLD AUTO: 10.6 10E3/UL (ref 4–11)

## 2025-06-10 PROCEDURE — 80048 BASIC METABOLIC PNL TOTAL CA: CPT | Performed by: INTERNAL MEDICINE

## 2025-06-10 PROCEDURE — 250N000013 HC RX MED GY IP 250 OP 250 PS 637: Performed by: INTERNAL MEDICINE

## 2025-06-10 PROCEDURE — 999N000157 HC STATISTIC RCP TIME EA 10 MIN

## 2025-06-10 PROCEDURE — 85014 HEMATOCRIT: CPT | Performed by: INTERNAL MEDICINE

## 2025-06-10 PROCEDURE — 36415 COLL VENOUS BLD VENIPUNCTURE: CPT | Performed by: INTERNAL MEDICINE

## 2025-06-10 PROCEDURE — 99239 HOSP IP/OBS DSCHRG MGMT >30: CPT | Performed by: INTERNAL MEDICINE

## 2025-06-10 PROCEDURE — 94762 N-INVAS EAR/PLS OXIMTRY CONT: CPT

## 2025-06-10 PROCEDURE — 250N000011 HC RX IP 250 OP 636: Performed by: INTERNAL MEDICINE

## 2025-06-10 RX ORDER — ROSUVASTATIN CALCIUM 40 MG/1
40 TABLET, COATED ORAL DAILY
Qty: 90 TABLET | Refills: 3 | Status: SHIPPED | OUTPATIENT
Start: 2025-06-10 | End: 2025-06-12

## 2025-06-10 RX ORDER — NITROGLYCERIN 0.4 MG/1
TABLET SUBLINGUAL
Qty: 20 TABLET | Refills: 1 | Status: SHIPPED | OUTPATIENT
Start: 2025-06-10

## 2025-06-10 RX ORDER — ASPIRIN 81 MG/1
81 TABLET ORAL DAILY
Qty: 30 TABLET | Refills: 3 | Status: SHIPPED | OUTPATIENT
Start: 2025-06-10

## 2025-06-10 RX ORDER — PRASUGREL 10 MG/1
10 TABLET, FILM COATED ORAL DAILY
Qty: 30 TABLET | Refills: 1 | Status: SHIPPED | OUTPATIENT
Start: 2025-06-10 | End: 2025-08-09

## 2025-06-10 RX ORDER — METOPROLOL SUCCINATE 25 MG/1
25 TABLET, EXTENDED RELEASE ORAL DAILY
Qty: 30 TABLET | Refills: 0 | Status: SHIPPED | OUTPATIENT
Start: 2025-06-10 | End: 2025-07-10

## 2025-06-10 RX ADMIN — PIPERACILLIN AND TAZOBACTAM 3.38 G: 3; .375 INJECTION, POWDER, FOR SOLUTION INTRAVENOUS at 00:09

## 2025-06-10 RX ADMIN — PIPERACILLIN AND TAZOBACTAM 3.38 G: 3; .375 INJECTION, POWDER, FOR SOLUTION INTRAVENOUS at 06:10

## 2025-06-10 RX ADMIN — PRASUGREL 10 MG: 10 TABLET, FILM COATED ORAL at 08:36

## 2025-06-10 RX ADMIN — ASPIRIN 81 MG: 81 TABLET, COATED ORAL at 08:36

## 2025-06-10 RX ADMIN — DOXYCYCLINE 100 MG: 100 INJECTION, POWDER, LYOPHILIZED, FOR SOLUTION INTRAVENOUS at 00:09

## 2025-06-10 RX ADMIN — METOPROLOL SUCCINATE 25 MG: 25 TABLET, EXTENDED RELEASE ORAL at 08:36

## 2025-06-10 RX ADMIN — ROSUVASTATIN 40 MG: 40 TABLET, FILM COATED ORAL at 08:36

## 2025-06-10 ASSESSMENT — ACTIVITIES OF DAILY LIVING (ADL)
ADLS_ACUITY_SCORE: 29

## 2025-06-10 NOTE — DISCHARGE SUMMARY
"[facility]  Hospitalist Discharge Summary      Date of Admission:  6/8/2025  Date of Discharge:  6/10/2025  Discharging Provider: Kayy Boggs MD  Discharge Service: Hospitalist Service    Discharge Diagnoses   STEMI  Opioid overdose  SANDY      Clinically Significant Risk Factors     # Overweight: Estimated body mass index is 28.12 kg/m  as calculated from the following:    Height as of an earlier encounter on 6/8/25: 1.803 m (5' 11\").    Weight as of this encounter: 91.4 kg (201 lb 9.6 oz).       Follow-ups Needed After Discharge   Follow-up Appointments       Follow Up      Follow up with cardiologist as instructed        Hospital Follow-up with Existing Primary Care Provider (PCP)          Schedule Primary Care visit within: 7 Days   Recommended labs and Imaging (to be ordered by Primary Care Provider): cbc, bmp               Unresulted Labs Ordered in the Past 30 Days of this Admission       No orders found from 5/9/2025 to 6/9/2025.            Discharge Disposition   Discharged to home  Condition at discharge: Stable    Hospital Course   Jossue Torres is a 54 year old male with a medical history of hypertension, hyperlipidemia, migraines, nicotine use and other medical problems including the loss of his son the day before admission from a car crush who is being admitted with STEMI after he was found unresponsive due to opioid overdose and STEMI    #STEMI  -S/p angioram  with stent placement in the OM branch of the left circumflex.   - Continue aspirin, metoprolol, statin, Brilinta (changed to Prasugrel by cardiologist later)  -Appreciate cardiology input  -LDL goal less than 70  -Check A1c 5.6  -Smoking cessation addressed  -Patient is not ready to quit  -Emotional support given regarding loss of son  -Offered psychotherapy-not interested at this time  -Check TTE: EF 55-60%  -He has a primary care physician and will follow up     #Metabolic encephalopathy due to substance " abuse  -Was due to abuse of some white powder   -Check urine tox screen+ for Fentanyl and Cocaine  -He was unable to tell me what he was  - responded to Narcan    #Opioid abuse/overdose  -Reportedly this is a one-time thing done in the face of acute distress  -He denies suicidal ideation or attempt  -Outpatient follow-up    # Acute distress with bereavement  -Emotional support given  -Not interested in therapy at this time  -23 year old son in a car crash the day prior to admission    #Acute renal failure  -Gentle hydration  -Avoid nephrotoxic drugs  -cr 1.45 at discharge. Follow up with PCP to recheck in 1 week    #Hypertension  -Continue metoprolol  - Controlled    Hyperlipidemia.   -Continue home medication  -Statin    #Nicotine use  -Replacement therapy as  -Not interested in quitting at this time  -This will require follow-up as outpatient    # Major depression  #Anxiety  -Emotional support  -Not interested in therapy      #Migraines  - Maxalt as needed  #Rest of patient's medical conditions management remains unchanged      Consultations This Hospital Stay   HOSPITALIST IP CONSULT  NUTRITION SERVICES ADULT IP CONSULT  CARDIAC REHAB IP CONSULT  CARDIOLOGY IP CONSULT  PSYCHOLOGY ADULT IP CONSULT  PSYCHIATRY IP CONSULT  SMOKING CESSATION PROGRAM IP CONSULT    Code Status   Full Code    Time Spent on this Encounter   I, Kayy Boggs MD, personally saw the patient today and spent greater than 30 minutes discharging this patient.       Kayy Boggs MD  [facility]  [address] [place], [place] [id number]  Phone: [phone]  Fax: [phone]  ______________________________________________________________________    Physical Exam   Vital Signs: Temp: 97.7  F (36.5  C) Temp src: Oral BP: (!) 146/82 Pulse: 59   Resp: 18 SpO2: 97 % O2 Device: None (Room air)    Weight: 201 lbs 9.6 oz  General.  Awake alert oriented not in acute distress.  HEENT.  Pupils equal round react to  light, anicteric, EOM intact.  Neck supple no JVD.  CVS regular rhythm no murmur gallops.  Lungs.  Clear to auscultation bilateral no wheezing or rales.  Abdomen.  Soft nontender bowel sounds present.  Extremities.  No edema no calf tenderness.  Neurological.  Awake and alert. No focal deficit.  Skin no rash. No pallor.  Psych. Normal mood.         Primary Care Physician   Tamia Yeh    Discharge Orders      Lipid Profile    Schedule Lipid profile in 4 weeks     Cardiac Rehab  Referral      Ambulatory Cardiologist Referral      Follow-Up with Cardiology Ambulatory Heart Care [facility] Referral      Reason for your hospital stay    STEMI  Opioid overdose  SANDY     Activity    Your activity upon discharge: activity as tolerated; avoid exertion until being re-evaluated by your doctor     Follow Up    Follow up with cardiologist as instructed     When to contact your care team    Call your primary doctor if you have any of the following:  increased shortness of breath, increased swelling, increased pain, or any concerns.     Discharge Instructions    Outpatient sleep study for possible KIRSTEN     Diet    Follow this diet upon discharge: Current Diet:Orders Placed This Encounter      Low Saturated Fat Na <2400 mg     Hospital Follow-up with Existing Primary Care Provider (PCP)            Significant Results and Procedures   Most Recent 3 CBC's:  Recent Labs   Lab Test 06/10/25  0527 06/08/25  2111 05/31/22  1343   WBC 10.6 25.3* 7.1   HGB 13.3 15.7 15.2   MCV 98 99 95    235 157     Most Recent 3 BMP's:  Recent Labs   Lab Test 06/10/25  0527 06/09/25  0436 06/08/25  2111    138 138   POTASSIUM 4.2 5.2 4.6   CHLORIDE 107 108* 102   CO2 24 24 20*   BUN 17.4 15.3 15.2   CR 1.45* 1.48* 1.60*   ANIONGAP 8 6* 16*   ANN MARIE 8.6* 8.2* 8.6*   GLC 97 123* 295*   ,   Results for orders placed or performed during the hospital encounter of 06/08/25   Echocardiogram Complete     Value    LVEF  55-60%    Narrative     [id number]  [id number]  [id number]  [id number]^MARCH^MOSHE^[place] [address]     Name: CHIVO PORTER  MRN: [id number]  : 1970  Study Date: 2025 07:20 AM  Age: 54 yrs  Gender: Male  Patient Location: [facility]  Reason For Study: MI  Ordering Physician: Moshe Hernandez MD  Referring Physician: Moshe Hernandez MD  Performed By: WR^^^^     BSA: 2.1 m2  Height: 71 in  Weight: 196 lb  HR: 72  BP: 105/65 mmHg  ______________________________________________________________________________  Procedure  Echocardiogram with two-dimensional, color and spectral Doppler. Adequate  quality two-dimensional was performed and interpreted. Adequate quality color  and spectral Doppler were performed and interpreted. There is no comparison  study available.  ______________________________________________________________________________  Interpretation Summary     1. Left ventricular chamber size, wall thickness and systolic function are  normal. The visually estimated left ventricular ejection fraction is 55-60%.  2. Right ventricular chamber size and systolic function are normal.  3. No hemodynamically significant valvular abnormalities.  4. No prior study available for comparison.  ______________________________________________________________________________  Left Ventricle  The left ventricle is normal in size. There is normal left ventricular wall  thickness. Left ventricular systolic function is normal. The visual ejection  fraction is 55-60%. Left ventricular diastolic function is normal. Diastolic  Doppler findings (E/E' ratio and/or other parameters) suggest left ventricular  filling pressures are normal. No regional wall motion abnormalities noted.     Right Ventricle  The right ventricle is normal size. The right ventricular systolic function is  normal.     Atria  Normal left atrial size. Right atrial size is normal.     Mitral Valve  Mitral valve leaflets  appear normal. There is trace mitral regurgitation.  There is no mitral valve stenosis.     Tricuspid Valve  Tricuspid valve leaflets appear normal. There is trace tricuspid  regurgitation. Right ventricular systolic pressure could not be approximated  due to inadequate tricuspid regurgitation. There is no tricuspid stenosis.     Aortic Valve  Aortic valve leaflets appear normal. The aortic valve is trileaflet. No aortic  regurgitation is present. No aortic stenosis is present.     Pulmonic Valve  The pulmonic valve is not well seen, but is grossly normal. There is no  pulmonic valvular regurgitation. There is no pulmonic valvular stenosis.     Vessels  The aorta root is normal. The thoracic aorta is normal. IVC diameter <2.1 cm  collapsing >50% with sniff suggests a normal RA pressure of 3 mmHg.     Pericardium  There is no pericardial effusion.     Rhythm  Sinus rhythm was noted.  ______________________________________________________________________________  MMode/2D Measurements & Calculations  IVSd: 1.1 cm     LVIDd: 5.0 cm  LVIDs: 3.3 cm  LVPWd: 1.1 cm  FS: 33.6 %  LV mass(C)d: 207.1 grams  LV mass(C)dI: 99.1 grams/m2  Ao root diam: 3.2 cm  LA dimension: 3.8 cm  asc Aorta Diam: 3.3 cm  LA/Ao: 1.2  LVOT diam: 2.2 cm  LVOT area: 3.8 cm2  Ao root diam index Ht(cm/m): 1.8  Ao root diam index BSA (cm/m2): 1.5  Asc Ao diam index BSA (cm/m2): 1.6  Asc Ao diam index Ht(cm/m): 1.8  EF Biplane: 62.3 %  LA Volume (BP): 37.9 ml     LA Volume Index (BP): 18.1 ml/m2  LA Volume Indexed (AL/bp): 19.5 ml/m2  RV Base: 3.5 cm  RWT: 0.45  TAPSE: 1.4 cm     Time Measurements  Aortic HR: 71.0 BPM  MM HR: 72.0 BPM     Doppler Measurements & Calculations  MV E max antonio: 53.3 cm/sec  MV A max antonio: 46.7 cm/sec  MV E/A: 1.1  MV dec slope: 217.0 cm/sec2  MV dec time: 0.25 sec  Ao V2 max: 130.0 cm/sec  Ao max P.0 mmHg  Ao V2 mean: 86.0 cm/sec  Ao mean PG: 3.0 mmHg  Ao V2 VTI: 23.0 cm  JOSE J(I,D): 3.4 cm2  JOSE J(V,D): 3.1 cm2  LV V1 max PG:  4.4 mmHg  LV V1 max: 105.0 cm/sec  LV V1 VTI: 20.5 cm  CO(LVOT): 5.5 l/min  CI(LVOT): 2.6 l/min/m2  SV(LVOT): 77.9 ml  SI(LVOT): 37.3 ml/m2  PA acc time: 0.14 sec  AV Bello Ratio (DI): 0.81  JOSE J Index (cm2/m2): 1.6  E/E': 4.0  E/E' av.9  Lateral E/e': 4.0  Medial E/e': 5.7     Peak E' Bello: 13.3 cm/sec  RV S Bello: 10.7 cm/sec     ______________________________________________________________________________  Report approved by: Dick Castro MD on 2025 10:41 AM         Cardiac Catheterization    Narrative    1) Lesion in OM1, likely ruptured plaque, S/P treatment with 2.5 X 18 mm   Alvin REGGIE  2) No residual obstructive CAD         Discharge Medications   Current Discharge Medication List        START taking these medications    Details   aspirin 81 MG EC tablet Take 1 tablet (81 mg) by mouth daily. Start tomorrow.  Qty: 30 tablet, Refills: 3    Associated Diagnoses: ST elevation myocardial infarction involving left circumflex coronary artery (H)      metoprolol succinate ER (TOPROL XL) 25 MG 24 hr tablet Take 1 tablet (25 mg) by mouth daily.  Qty: 30 tablet, Refills: 0    Associated Diagnoses: ST elevation myocardial infarction involving left circumflex coronary artery (H)      nitroGLYcerin (NITROSTAT) 0.4 MG sublingual tablet For chest pain place 1 tablet under the tongue every 5 minutes for 3 doses. If symptoms persist 5 minutes after 1st dose call 911.  Qty: 20 tablet, Refills: 1    Associated Diagnoses: ST elevation myocardial infarction involving left circumflex coronary artery (H)      prasugrel (EFFIENT) 10 MG TABS tablet Take 1 tablet (10 mg) by mouth daily.  Qty: 30 tablet, Refills: 1    Associated Diagnoses: ST elevation myocardial infarction involving left circumflex coronary artery (H)      rosuvastatin (CRESTOR) 40 MG tablet Take 1 tablet (40 mg) by mouth daily.  Qty: 90 tablet, Refills: 3    Associated Diagnoses: ST elevation myocardial infarction involving left circumflex coronary artery (H)            CONTINUE these medications which have NOT CHANGED    Details   acetaminophen (TYLENOL) 500 MG tablet Take 500-1,000 mg by mouth every 6 hours as needed for mild pain.      aspirin-acetaminophen-caffeine (EXCEDRIN MIGRAINE) 250-250-65 MG tablet Take 1 tablet by mouth every 8 hours as needed for headaches.      eletriptan (RELPAX) 40 MG tablet Take 1 tablet by mouth. repeat after 2 hours if needed.  Qty: 24 tablet, Refills: 5    Comments: This prescription was filled on 11/19/2024. Any refills authorized will be placed on file.  Associated Diagnoses: Chronic migraine without aura without status migrainosus, not intractable      galcanezumab-gnlm (EMGALITY) 120 MG/ML injection INJECT 120MG UNDER THE SKIN EVERY 28 DAYS  Qty: 1 mL, Refills: 5    Associated Diagnoses: Chronic migraine without aura without status migrainosus, not intractable      losartan (COZAAR) 100 MG tablet Take 1 tablet (100 mg) by mouth daily.  Qty: 90 tablet, Refills: 3    Associated Diagnoses: Hypertension goal BP (blood pressure) < 140/90           STOP taking these medications       simvastatin (ZOCOR) 40 MG tablet Comments:   Reason for Stopping:             Allergies   No Known Allergies

## 2025-06-10 NOTE — PLAN OF CARE
Pt alert and oriented x4, however has been turning off alarms and pumps by himself when they go off. Educated to call the nurse to address IV pumps. Pt also disconnected himself from his O2 sleep study. Pt appears confused by beeping alarms. Had an episode of anxiety last night and received one dose of IV ativan which helped him sleep.   IV antibiotics still, otherwise VSS on room air and patient is wanting to discharge       Problem: Risk for Delirium  Goal: Improved Behavioral Control  Outcome: Not Progressing  Intervention: Minimize Safety Risk  Recent Flowsheet Documentation  Taken 6/10/2025 0400 by Laverne Corrales, RN  Enhanced Safety Measures:   pain management   patient/family teach back on injury risk   room near unit station  Taken 6/10/2025 0000 by Laverne Corrales, RN  Enhanced Safety Measures:   pain management   patient/family teach back on injury risk   room near unit station  Goal: Improved Attention and Thought Clarity  Outcome: Not Progressing     Problem: Adult Inpatient Plan of Care  Goal: Plan of Care Review  Description: The Plan of Care Review/Shift note should be completed every shift.  The Outcome Evaluation is a brief statement about your assessment that the patient is improving, declining, or no change.  This information will be displayed automatically on your shift  note.  Outcome: Progressing  Flowsheets (Taken 6/10/2025 0628)  Outcome Evaluation: Pt reports no pain this shift. VSS on room air. Independent in the room.  Plan of Care Reviewed With: patient  Overall Patient Progress: improving     Problem: Risk for Delirium  Goal: Optimal Coping  Outcome: Progressing  Goal: Improved Sleep  Outcome: Progressing

## 2025-06-10 NOTE — PLAN OF CARE
Goal Outcome Evaluation:         Problem: Adult Inpatient Plan of Care  Goal: Plan of Care Review  Description: The Plan of Care Review/Shift note should be completed every shift.  The Outcome Evaluation is a brief statement about your assessment that the patient is improving, declining, or no change.  This information will be displayed automatically on your shift  note.  Outcome: Progressing     [facility] - ICU    RN Progress Note:            Pertinent Assessments:      Please refer to flowsheet rows for full assessment     Alert and oriented. Denied pain or discomfort. Up and ambulating to the  independent tolerated well.            Key Events - This Shift:            RN Managed Protocols Ordered:  No  Protocols: None  PRN'S:No   Protocols Status:                 Barriers to Discharge / Downgrade:     Transferred to P3 room 332. Pt will call family tomorrow morning.

## 2025-06-10 NOTE — PLAN OF CARE
S/P PCI x 1 on 6/8. A&O x 4, calm, pleasant. He reported feeling a little anxious this morning with mild sob. Resting resp unlabored, SpO2 97% RA, LS clear. Emotional support provided. Denied suicidal ideation. Noted that he declined behavioral health assessment yesterday. He reported he had some heart burn overnight after eating dinner, resolved early this morning. Ate ice cream x 2 for breakfast with no reoccurrence of heartburn. Denied CP, dizziness. VSS. Cleared for discharge home this morning. Discharge instructions reviewed, resources for mental health crisis and education on grieving added to discharge packet. All questions and concerns addressed. All personal belongings at bedside. Family/friend picked him up.       Problem: Adult Inpatient Plan of Care  Goal: Plan of Care Review  Outcome: Met  Goal: Patient-Specific Goal (Individualized)  Outcome: Met  Goal: Absence of Hospital-Acquired Illness or Injury  Outcome: Met  Intervention: Identify and Manage Fall Risk  Recent Flowsheet Documentation  Taken 6/10/2025 0800 by Perla Arguelles RN  Safety Promotion/Fall Prevention:   assistive device/personal items within reach   clutter free environment maintained   nonskid shoes/slippers when out of bed   patient and family education   room near nurse's station   safety round/check completed   treat underlying cause  Intervention: Prevent Skin Injury  Recent Flowsheet Documentation  Taken 6/10/2025 0728 by Perla Arguelles RN  Body Position: position changed independently  Intervention: Prevent and Manage VTE (Venous Thromboembolism) Risk  Recent Flowsheet Documentation  Taken 6/10/2025 0800 by Perla Arguelles RN  VTE Prevention/Management: (up ad lesley) SCDs off (sequential compression devices)  Goal: Optimal Comfort and Wellbeing  Outcome: Met  Intervention: Provide Person-Centered Care  Recent Flowsheet Documentation  Taken 6/10/2025 0800 by Perla Arguelles RN  Trust Relationship/Rapport:   care explained   choices  provided   emotional support provided   empathic listening provided   questions answered   questions encouraged   reassurance provided   thoughts/feelings acknowledged  Goal: Readiness for Transition of Care  Outcome: Met     Problem: Risk for Delirium  Goal: Optimal Coping  Outcome: Met  Intervention: Optimize Psychosocial Adjustment to Delirium  Recent Flowsheet Documentation  Taken 6/10/2025 0800 by Perla Arguelles RN  Supportive Measures:   active listening utilized   decision-making supported   verbalization of feelings encouraged  Family/Support System Care: involvement promoted  Goal: Improved Behavioral Control  Outcome: Met  Intervention: Prevent and Manage Agitation  Recent Flowsheet Documentation  Taken 6/10/2025 0800 by Perla Arguelles RN  Environment Familiarity/Consistency: daily routine followed  Intervention: Minimize Safety Risk  Recent Flowsheet Documentation  Taken 6/10/2025 0800 by Perla Arguelles RN  Enhanced Safety Measures:   monitor patients coagulation values   pain management   patient/family teach back on injury risk   Pre/Post Op education on fall prevention   review medications for side effects with activity   room near unit station  Trust Relationship/Rapport:   care explained   choices provided   emotional support provided   empathic listening provided   questions answered   questions encouraged   reassurance provided   thoughts/feelings acknowledged  Goal: Improved Attention and Thought Clarity  Outcome: Met  Intervention: Maximize Cognitive Function  Recent Flowsheet Documentation  Taken 6/10/2025 0800 by Perla Arguelles RN  Sensory Stimulation Regulation:   care clustered   quiet environment promoted  Reorientation Measures:   calendar in view   clock in view  Goal: Improved Sleep  Outcome: Met

## 2025-06-10 NOTE — PLAN OF CARE
Problem: Adult Inpatient Plan of Care  Goal: Readiness for Transition of Care  Outcome: Progressing   Goal Outcome Evaluation:    Transferred from ICU to P2 at 2330. LR running at mL/hr. Independent in room. Calm and pleasant. Denies pain. Complains of heartburn, PRN TUMS given. Left radial access site for angiogram is clean, dry, and intact. RT set up patient for overnight oximetry study.

## 2025-06-12 ENCOUNTER — OFFICE VISIT (OUTPATIENT)
Dept: FAMILY MEDICINE | Facility: CLINIC | Age: 55
End: 2025-06-12
Attending: INTERNAL MEDICINE
Payer: COMMERCIAL

## 2025-06-12 VITALS
SYSTOLIC BLOOD PRESSURE: 162 MMHG | BODY MASS INDEX: 27.5 KG/M2 | DIASTOLIC BLOOD PRESSURE: 100 MMHG | HEART RATE: 67 BPM | RESPIRATION RATE: 20 BRPM | TEMPERATURE: 98.2 F | OXYGEN SATURATION: 97 % | WEIGHT: 196.4 LBS | HEIGHT: 71 IN

## 2025-06-12 DIAGNOSIS — I21.21 ST ELEVATION MYOCARDIAL INFARCTION INVOLVING LEFT CIRCUMFLEX CORONARY ARTERY (H): ICD-10-CM

## 2025-06-12 DIAGNOSIS — Z09 HOSPITAL DISCHARGE FOLLOW-UP: Primary | ICD-10-CM

## 2025-06-12 DIAGNOSIS — G43.709 CHRONIC MIGRAINE WITHOUT AURA WITHOUT STATUS MIGRAINOSUS, NOT INTRACTABLE: ICD-10-CM

## 2025-06-12 DIAGNOSIS — I10 HYPERTENSION GOAL BP (BLOOD PRESSURE) < 140/90: ICD-10-CM

## 2025-06-12 DIAGNOSIS — T50.901A OVERDOSE, ACCIDENTAL OR UNINTENTIONAL, INITIAL ENCOUNTER: ICD-10-CM

## 2025-06-12 LAB
ANION GAP SERPL CALCULATED.3IONS-SCNC: 9 MMOL/L (ref 7–15)
BUN SERPL-MCNC: 9.8 MG/DL (ref 6–20)
CALCIUM SERPL-MCNC: 9.7 MG/DL (ref 8.8–10.4)
CHLORIDE SERPL-SCNC: 108 MMOL/L (ref 98–107)
CREAT SERPL-MCNC: 1.4 MG/DL (ref 0.67–1.17)
EGFRCR SERPLBLD CKD-EPI 2021: 59 ML/MIN/1.73M2
ERYTHROCYTE [DISTWIDTH] IN BLOOD BY AUTOMATED COUNT: 12.5 % (ref 10–15)
GLUCOSE SERPL-MCNC: 104 MG/DL (ref 70–99)
HCO3 SERPL-SCNC: 26 MMOL/L (ref 22–29)
HCT VFR BLD AUTO: 43.2 % (ref 40–53)
HGB BLD-MCNC: 14.6 G/DL (ref 13.3–17.7)
MCH RBC QN AUTO: 32.2 PG (ref 26.5–33)
MCHC RBC AUTO-ENTMCNC: 33.8 G/DL (ref 31.5–36.5)
MCV RBC AUTO: 95 FL (ref 78–100)
PLATELET # BLD AUTO: 213 10E3/UL (ref 150–450)
POTASSIUM SERPL-SCNC: 4.2 MMOL/L (ref 3.4–5.3)
RBC # BLD AUTO: 4.54 10E6/UL (ref 4.4–5.9)
SODIUM SERPL-SCNC: 143 MMOL/L (ref 135–145)
WBC # BLD AUTO: 7.9 10E3/UL (ref 4–11)

## 2025-06-12 RX ORDER — GALCANEZUMAB 120 MG/ML
120 INJECTION, SOLUTION SUBCUTANEOUS
Qty: 1 ML | Refills: 5 | Status: SHIPPED | OUTPATIENT
Start: 2025-06-12

## 2025-06-12 RX ORDER — AMLODIPINE BESYLATE 5 MG/1
5 TABLET ORAL DAILY
Qty: 30 TABLET | Refills: 1 | Status: SHIPPED | OUTPATIENT
Start: 2025-06-12

## 2025-06-12 RX ORDER — ROSUVASTATIN CALCIUM 40 MG/1
40 TABLET, COATED ORAL DAILY
Qty: 90 TABLET | Refills: 3 | Status: SHIPPED | OUTPATIENT
Start: 2025-06-12

## 2025-06-12 ASSESSMENT — PAIN SCALES - GENERAL: PAINLEVEL_OUTOF10: NO PAIN (0)

## 2025-06-12 NOTE — PROGRESS NOTES
Assessment & Plan     Hospital discharge follow-up  Reviewed recent hospital discharge paperwork.  Available in epic.    ST elevation myocardial infarction involving left circumflex coronary artery (H)  ST elevation MI following an unintentional overdose.  Angiogram successful placing 1 stent.  Healing and doing well.  Starts cardiac rehab in July.  - rosuvastatin (CRESTOR) 40 MG tablet; Take 1 tablet (40 mg) by mouth daily.    Hypertension goal BP (blood pressure) < 140/90  Blood pressure significantly elevated today restart amlodipine he had not refilled this after I had started it.  Patient to follow-up in clinic in 3 weeks to recheck blood pressure and to reassess how everything is going.  Did with scheduling patient today.  - Basic metabolic panel  (Ca, Cl, CO2, Creat, Gluc, K, Na, BUN); Future  - CBC with platelets; Future  - amLODIPine (NORVASC) 5 MG tablet; Take 1 tablet (5 mg) by mouth daily.  - Basic metabolic panel  (Ca, Cl, CO2, Creat, Gluc, K, Na, BUN)  - CBC with platelets    Chronic migraine without aura without status migrainosus, not intractable  Migraine management has overall been stable and doing well he would like to continue with Emgality wondering if I will take this over discussed with patient that this is not normally medication that I take on after review of patient's use of the medication and the medication itself prescription renewed for patient today he has had significant reduction of his migraines from 15 migraines per month down to 6 continues to use Relpax as needed.  - galcanezumab-gnlm (EMGALITY) 120 MG/ML injection; Inject 1 mL (120 mg) subcutaneously every 28 days.    Overdose, accidental or unintentional, initial encounter  Does not have a history of drug abuse/use.  He declines since for support regarding this.    Condolences provided to patient regarding the death of his son in a car accident.        MED REC REQUIRED  Post Medication Reconciliation Status:  Discharge  "medications reconciled and changed, see notes/orders  BMI  Estimated body mass index is 27.39 kg/m  as calculated from the following:    Height as of this encounter: 1.803 m (5' 11\").    Weight as of this encounter: 89.1 kg (196 lb 6.4 oz).         Darin Borrego is a 54 year old, presenting for the following health issues:  Hospital F/U        2025     1:50 PM   Additional Questions   Roomed by Regla BULLOCK CMA     [facility]        Hospital Follow-up Visit:    Hospital/Nursing Home/IP Rehab Facility: [facility]  Most Recent Admission Date: 2025   Most Recent Admission Diagnosis: ST elevation MI (STEMI) (H) - I21.3     Most Recent Discharge Date: 6/10/2025   Most Recent Discharge Diagnosis: ST elevation MI (STEMI) (H) - I21.3  ST elevation myocardial infarction involving left circumflex coronary artery (H) - I21.21   Do you have any other stressors you would like to discuss with your provider? OTHER: trouble breathing at certain times, riding in a vehicle or being in a hot room, he has a hard time catching his breathe    Problems taking medications regularly:  None  Medication changes since discharge: None  Problems adhering to non-medication therapy:  None    Summary of hospitalization:  [facility] discharge summary reviewed  Diagnostic Tests/Treatments reviewed.  Follow up needed: none  Other Healthcare Providers Involved in Patient s Care:         None  Update since discharge: improved.         Plan of care communicated with patient         Unintentional overdose, found a baggie white powder on sons desk who just  in MVA the day before. Thought it was cocaine-- he has used cocaine rarely over the years. Did not know it was cocaine laced with fentanyl.     The overdose unfortunately caused a STEMI. Stent was placed and perfusion obtained.     He is also wondering if he could have his emgality transferred to primary care. He continues to get migraines but they are " not as severe. Down to 6 migraines per month uses relpax as needed for this. Previous migraine incidence was 12-15 per month.     Discharge summary:  Jossue Torres is a 54 year old male with a medical history of hypertension, hyperlipidemia, migraines, nicotine use and other medical problems including the loss of his son the day before admission from a car crush who is being admitted with STEMI after he was found unresponsive due to opioid overdose and STEMI     #STEMI  -S/p angioram  with stent placement in the OM branch of the left circumflex.   - Continue aspirin, metoprolol, statin, Brilinta (changed to Prasugrel by cardiologist later)  -Appreciate cardiology input  -LDL goal less than 70  -Check A1c 5.6  -Smoking cessation addressed  -Patient is not ready to quit  -Emotional support given regarding loss of son  -Offered psychotherapy-not interested at this time  -Check TTE: EF 55-60%  -He has a primary care physician and will follow up      #Metabolic encephalopathy due to substance abuse  -Was due to abuse of some white powder   -Check urine tox screen+ for Fentanyl and Cocaine  -He was unable to tell me what he was  - responded to Narcan     #Opioid abuse/overdose  -Reportedly this is a one-time thing done in the face of acute distress  -He denies suicidal ideation or attempt  -Outpatient follow-up     # Acute distress with bereavement  -Emotional support given  -Not interested in therapy at this time  -23 year old son in a car crash the day prior to admission     #Acute renal failure  -Gentle hydration  -Avoid nephrotoxic drugs  -cr 1.45 at discharge. Follow up with PCP to recheck in 1 week     #Hypertension  -Continue metoprolol  - Controlled     Hyperlipidemia.   -Continue home medication  -Statin     #Nicotine use  -Replacement therapy as  -Not interested in quitting at this time  -This will require follow-up as outpatient     # Major depression  #Anxiety  -Emotional support  -Not interested in therapy    "     #Migraines  - Maxalt as needed  #Rest of patient's medical conditions management remains unchanged            Review of Systems  Constitutional, neuro, ENT, endocrine, pulmonary, cardiac, gastrointestinal, genitourinary, musculoskeletal, integument and psychiatric systems are negative, except as otherwise noted.      Objective    BP (!) 162/100   Pulse 67   Temp 98.2  F (36.8  C) (Tympanic)   Resp 20   Ht 1.803 m (5' 11\")   Wt 89.1 kg (196 lb 6.4 oz)   SpO2 97%   BMI 27.39 kg/m    Body mass index is 27.39 kg/m .  Physical Exam   GENERAL: alert and no distress  NECK: no adenopathy, no asymmetry, masses, or scars  RESP: lungs clear to auscultation - no rales, rhonchi or wheezes  CV: regular rate and rhythm, normal S1 S2, no S3 or S4, no murmur, click or rub, no peripheral edema  ABDOMEN: soft, nontender, no hepatosplenomegaly, no masses and bowel sounds normal  MS: no gross musculoskeletal defects noted, no edema  PSYCH: mentation appears normal, affect normal/bright            Signed Electronically by: KATHERINE Jaramillo CNP    "

## 2025-06-13 ENCOUNTER — RESULTS FOLLOW-UP (OUTPATIENT)
Dept: FAMILY MEDICINE | Facility: CLINIC | Age: 55
End: 2025-06-13

## 2025-07-01 ENCOUNTER — OFFICE VISIT (OUTPATIENT)
Dept: FAMILY MEDICINE | Facility: CLINIC | Age: 55
End: 2025-07-01
Payer: COMMERCIAL

## 2025-07-01 VITALS
TEMPERATURE: 98.1 F | RESPIRATION RATE: 16 BRPM | HEIGHT: 71 IN | SYSTOLIC BLOOD PRESSURE: 122 MMHG | DIASTOLIC BLOOD PRESSURE: 70 MMHG | OXYGEN SATURATION: 98 % | HEART RATE: 85 BPM | WEIGHT: 194 LBS | BODY MASS INDEX: 27.16 KG/M2

## 2025-07-01 DIAGNOSIS — I10 HYPERTENSION GOAL BP (BLOOD PRESSURE) < 140/90: ICD-10-CM

## 2025-07-01 DIAGNOSIS — I21.21 ST ELEVATION MYOCARDIAL INFARCTION INVOLVING LEFT CIRCUMFLEX CORONARY ARTERY (H): ICD-10-CM

## 2025-07-01 LAB
ANION GAP SERPL CALCULATED.3IONS-SCNC: 13 MMOL/L (ref 7–15)
BUN SERPL-MCNC: 14.4 MG/DL (ref 6–20)
CALCIUM SERPL-MCNC: 9 MG/DL (ref 8.8–10.4)
CHLORIDE SERPL-SCNC: 106 MMOL/L (ref 98–107)
CHOLEST SERPL-MCNC: 147 MG/DL
CREAT SERPL-MCNC: 1.36 MG/DL (ref 0.67–1.17)
EGFRCR SERPLBLD CKD-EPI 2021: 61 ML/MIN/1.73M2
GLUCOSE SERPL-MCNC: 105 MG/DL (ref 70–99)
HCO3 SERPL-SCNC: 20 MMOL/L (ref 22–29)
HDLC SERPL-MCNC: 43 MG/DL
LDLC SERPL CALC-MCNC: 73 MG/DL
NONHDLC SERPL-MCNC: 104 MG/DL
POTASSIUM SERPL-SCNC: 4.3 MMOL/L (ref 3.4–5.3)
SODIUM SERPL-SCNC: 139 MMOL/L (ref 135–145)
TRIGL SERPL-MCNC: 153 MG/DL

## 2025-07-01 PROCEDURE — 80048 BASIC METABOLIC PNL TOTAL CA: CPT | Performed by: NURSE PRACTITIONER

## 2025-07-01 PROCEDURE — 3078F DIAST BP <80 MM HG: CPT | Performed by: NURSE PRACTITIONER

## 2025-07-01 PROCEDURE — 36415 COLL VENOUS BLD VENIPUNCTURE: CPT | Performed by: NURSE PRACTITIONER

## 2025-07-01 PROCEDURE — 80061 LIPID PANEL: CPT | Performed by: NURSE PRACTITIONER

## 2025-07-01 PROCEDURE — 3074F SYST BP LT 130 MM HG: CPT | Performed by: NURSE PRACTITIONER

## 2025-07-01 PROCEDURE — 1126F AMNT PAIN NOTED NONE PRSNT: CPT | Performed by: NURSE PRACTITIONER

## 2025-07-01 PROCEDURE — 99214 OFFICE O/P EST MOD 30 MIN: CPT | Performed by: NURSE PRACTITIONER

## 2025-07-01 RX ORDER — AMLODIPINE BESYLATE 5 MG/1
5 TABLET ORAL DAILY
Qty: 90 TABLET | Refills: 3 | Status: SHIPPED | OUTPATIENT
Start: 2025-07-01 | End: 2025-07-01

## 2025-07-01 RX ORDER — AMLODIPINE BESYLATE 5 MG/1
5 TABLET ORAL DAILY
Qty: 90 TABLET | Refills: 3 | Status: SHIPPED | OUTPATIENT
Start: 2025-07-01

## 2025-07-01 ASSESSMENT — PAIN SCALES - GENERAL: PAINLEVEL_OUTOF10: NO PAIN (0)

## 2025-07-01 NOTE — PROGRESS NOTES
"  Assessment & Plan     Hypertension goal BP (blood pressure) < 140/90  Improved with amlodipine. Recommend continued use of this. If too low okay to take 1/2 tab. Labs updated to recheck kidney function.   - Basic metabolic panel  (Ca, Cl, CO2, Creat, Gluc, K, Na, BUN); Future  - amLODIPine (NORVASC) 5 MG tablet; Take 1 tablet (5 mg) by mouth daily.  - Basic metabolic panel  (Ca, Cl, CO2, Creat, Gluc, K, Na, BUN)    ST elevation myocardial infarction involving left circumflex coronary artery (H)  Released by labs.   - Lipid Profile          BMI  Estimated body mass index is 27.06 kg/m  as calculated from the following:    Height as of this encounter: 1.803 m (5' 11\").    Weight as of this encounter: 88 kg (194 lb).       Darin Borrego is a 54 year old, presenting for the following health issues:  Hypertension        7/1/2025     1:08 PM   Additional Questions   Roomed by Deborah DOWNS MA     History of Present Illness       Hypertension: He presents for follow up of hypertension.  He does check blood pressure  regularly outside of the clinic. Outpatient blood pressures have not been over 140/90. He follows a low salt diet.     He eats 0-1 servings of fruits and vegetables daily.He consumes 3 sweetened beverage(s) daily.He exercises with enough effort to increase his heart rate 9 or less minutes per day.  He exercises with enough effort to increase his heart rate 3 or less days per week.   He is taking medications regularly.  He denies any ankle swelling. BP at home has improved.       Intermittent heart burn symptoms. Resolved with otc tums/ rolaids        Hyperlipidemia Follow-Up  Are you regularly taking any medication or supplement to lower your cholesterol?   Yes- Rosuvastatin  Are you having muscle aches or other side effects that you think could be caused by your cholesterol lowering medication?  No          Review of Systems  Constitutional, HEENT, cardiovascular, pulmonary, gi and gu systems are negative, " "except as otherwise noted.      Objective    /70   Pulse 85   Temp 98.1  F (36.7  C) (Tympanic)   Resp 16   Ht 1.803 m (5' 11\")   Wt 88 kg (194 lb)   SpO2 98%   BMI 27.06 kg/m    Body mass index is 27.06 kg/m .  Physical Exam   GENERAL: alert and no distress  NECK: no adenopathy, no asymmetry, masses, or scars  RESP: lungs clear to auscultation - no rales, rhonchi or wheezes  CV: regular rate and rhythm, normal S1 S2, no S3 or S4, no murmur, click or rub, no peripheral edema  ABDOMEN: soft, nontender, no hepatosplenomegaly, no masses and bowel sounds normal  MS: no gross musculoskeletal defects noted, no edema  PSYCH: mentation appears normal, affect normal/bright            Signed Electronically by: KATHERINE Jaramillo CNP    "

## 2025-07-02 ENCOUNTER — RESULTS FOLLOW-UP (OUTPATIENT)
Dept: FAMILY MEDICINE | Facility: CLINIC | Age: 55
End: 2025-07-02
Payer: COMMERCIAL

## 2025-07-02 DIAGNOSIS — I21.21 ST ELEVATION MYOCARDIAL INFARCTION INVOLVING LEFT CIRCUMFLEX CORONARY ARTERY (H): ICD-10-CM

## 2025-07-08 RX ORDER — METOPROLOL SUCCINATE 25 MG/1
25 TABLET, EXTENDED RELEASE ORAL DAILY
Qty: 30 TABLET | Refills: 0 | Status: SHIPPED | OUTPATIENT
Start: 2025-07-08

## (undated) DEVICE — SPONGE COTTONOID 1/2X3" 20-07S

## (undated) DEVICE — PAD CHUX UNDERPAD 23X24" 7136

## (undated) DEVICE — NDL COUNTER 20CT 31142493

## (undated) DEVICE — ESU ELEC BLADE 2.75" COATED/INSULATED E1455

## (undated) DEVICE — LINEN TOWEL PACK X30 5481

## (undated) DEVICE — SPONGE COTTONOID 1X3" 20-10S

## (undated) DEVICE — GUIDEWIRE VASC 0.014INX180CM RUNTHROUGH 25-1011

## (undated) DEVICE — DRAPE WARMER 66X44" ORS-300

## (undated) DEVICE — GLOVE PROTEXIS MICRO 7.5  2D73PM75

## (undated) DEVICE — DRAPE CRANIOTOMY W/POUCH 9450

## (undated) DEVICE — BUR STRK 2.3MM TAPERED ROUTER - FA2 5407-FA2-023

## (undated) DEVICE — LINEN TOWEL PACK X6 WHITE 5487

## (undated) DEVICE — DRAPE SHEET REV FOLD 3/4 9349

## (undated) DEVICE — SURGICEL HEMOSTAT 4X8" 1952

## (undated) DEVICE — CUSTOM PACK CORONARY SAN5BCRHEA

## (undated) DEVICE — STPL SKIN 35W ROTATING HEAD PRW35

## (undated) DEVICE — SU NUROLON 4-0 TF CR 8X18" C584D

## (undated) DEVICE — ESU PENCIL SMOKE EVAC W/ROCKER SWITCH 0703-047-000

## (undated) DEVICE — CATH BALLOON EMERGE 2.0X12MM H7493918912200

## (undated) DEVICE — DECANTER BAG 2002S

## (undated) DEVICE — SPONGE COTTONOID 1/2X1/2" 20-04S

## (undated) DEVICE — MANIFOLD KIT ANGIO AUTOMATED 014613

## (undated) DEVICE — BUR STRK CARBIDE ROUND 5.0MM 5820-110-050C

## (undated) DEVICE — SOL NACL 0.9% IRRIG 1000ML BOTTLE 2F7124

## (undated) DEVICE — NDL ANGIOCATH 14GA 1.25" 4048

## (undated) DEVICE — SU ETHILON 3-0 PS-1 18" 1663H

## (undated) DEVICE — ELECTRODE DEFIB CADENCE 22550R

## (undated) DEVICE — PIN SKULL MAYFIELD ADULT TITANIUM 3/PK A1120

## (undated) DEVICE — KIT HAND CONTROL ACIST 014644 AR-P54

## (undated) DEVICE — SOL RINGERS LACTATED 1000ML BAG 07953-09

## (undated) DEVICE — SPONGE COTTONOID 1/2X1 1/2" 20-06S

## (undated) DEVICE — EXCHANGE WIRE .035 260 STAR/JFC/035/260/ M001491681

## (undated) DEVICE — SYR 10ML LL W/O NDL 302995

## (undated) DEVICE — CATH TRAY FOLEY SURESTEP 16FR W/URINE MTR STATLK LF A303416A

## (undated) DEVICE — ESU CORD BIPOLAR AND IRR TUBING AESCULAP US355

## (undated) DEVICE — PREP SKIN SCRUB TRAY 4461A

## (undated) DEVICE — RX BACITRACIN OINTMENT 0.9G 1/32OZ CUR001109

## (undated) DEVICE — ESU GROUND PAD ADULT W/CORD E7507

## (undated) DEVICE — VALVE HEMOSTATIC WATCHDOG 8FR INNER LUMEN H74939343021

## (undated) DEVICE — PREP POVIDONE-IODINE 7.5% SCRUB 4OZ BOTTLE MDS093945

## (undated) DEVICE — ADPT 5 IN 1 360

## (undated) DEVICE — SLEEVE TR BAND RADIAL COMPRESSION DEVICE 24CM TRB24-REG

## (undated) DEVICE — SOL WATER IRRIG 1000ML BOTTLE 2F7114

## (undated) DEVICE — ESU FCP CODMAN 9"X1.0MM VERSATRU 9009100SL

## (undated) DEVICE — CLIP RANEY

## (undated) DEVICE — RX SURGIFLO HEMOSTATIC MATRIX W/THROMBIN 8ML 2994

## (undated) DEVICE — DRSG KERLIX 4 1/2"X4YDS ROLL 6715

## (undated) DEVICE — STPL SKIN PROXIMATE 35 WIDE PMW35

## (undated) DEVICE — SU VICRYL 3-0 SH 8X18" UND J864D

## (undated) DEVICE — BONE WAX 2.5GM W31G

## (undated) DEVICE — PREP CHLORAPREP CLEAR 3ML 930400

## (undated) DEVICE — SU VICRYL 0 CT-1 CR 8X18" J740D

## (undated) DEVICE — CATH ANGIO JUDKINS R4 6FRX100CM INFINITI 534621T

## (undated) DEVICE — PREP POVIDONE IODINE SOLUTION 10% 4OZ BOTTLE 29906-004

## (undated) DEVICE — CATH LAUNCHER 6FR JL 4.0 LA6JL40

## (undated) DEVICE — RETR ELASTIC STAYS LONE STAR BLUNT DUAL LEAD 3550-1G

## (undated) DEVICE — INTRO GLIDESHEATH SLENDER 6FR 10X45CM 60-1060

## (undated) DEVICE — DRAPE MICROSCOPE LEICA 54X120" 09-MK653

## (undated) DEVICE — PACK GOWN 3/PK DISP XL SBA32GPFCB

## (undated) DEVICE — SU VICRYL 2-0 CT-2 CR 8X18" J726D

## (undated) DEVICE — DEVICE INFLATION SYR W/ HEMOSTASIS VALVE 12IN EXT IN4904

## (undated) DEVICE — SPONGE SURGIFOAM 100 1974

## (undated) DEVICE — DECANTER VIAL 2006S

## (undated) DEVICE — ENDO SNARE EXACTO COLD 9MM LOOP 2.4MMX230CM 00711115

## (undated) DEVICE — SPONGE COTTONOID 1/4X1/4" 20-01S

## (undated) DEVICE — CATH ANGIO JUDKINS JL4 6FRX100CM INFINITI 534620T

## (undated) DEVICE — PACK CRANIOTOMY

## (undated) DEVICE — IOM SUPPLIES

## (undated) DEVICE — SYR 30ML LL W/O NDL 302832

## (undated) DEVICE — STRAP UNIVERSAL POSITIONING 2-PIECE 4X47X76" 91-287

## (undated) DEVICE — DRSG PRIMAPORE 03 1/8X6" 66000318

## (undated) DEVICE — ESU CORD BIPOLAR GREEN 10-4000

## (undated) DEVICE — CATH LAUNCHER 6FR JR 4.0 LA6JR40

## (undated) DEVICE — SYR ANGIOGRAPHY MULTIUSE KIT ACIST 014612

## (undated) DEVICE — SUCTION MANIFOLD NEPTUNE 2 SYS 4 PORT 0702-020-000

## (undated) DEVICE — DRAPE POUCH INSTRUMENT 1018

## (undated) DEVICE — LABEL MEDICATION SYSTEM 3303-P

## (undated) DEVICE — TEST TUBE W/SCREW CAP 17361

## (undated) DEVICE — Device

## (undated) RX ORDER — ROCURONIUM BROMIDE 50 MG/5 ML
SYRINGE (ML) INTRAVENOUS
Status: DISPENSED
Start: 2022-04-07

## (undated) RX ORDER — DEXAMETHASONE SODIUM PHOSPHATE 4 MG/ML
INJECTION, SOLUTION INTRA-ARTICULAR; INTRALESIONAL; INTRAMUSCULAR; INTRAVENOUS; SOFT TISSUE
Status: DISPENSED
Start: 2022-05-06

## (undated) RX ORDER — SODIUM CHLORIDE 9 MG/ML
INJECTION, SOLUTION INTRAVENOUS
Status: DISPENSED
Start: 2022-04-07

## (undated) RX ORDER — FENTANYL CITRATE 50 UG/ML
INJECTION, SOLUTION INTRAMUSCULAR; INTRAVENOUS
Status: DISPENSED
Start: 2022-04-07

## (undated) RX ORDER — PROPOFOL 10 MG/ML
INJECTION, EMULSION INTRAVENOUS
Status: DISPENSED
Start: 2022-12-23

## (undated) RX ORDER — ROCURONIUM BROMIDE 50 MG/5 ML
SYRINGE (ML) INTRAVENOUS
Status: DISPENSED
Start: 2022-05-06

## (undated) RX ORDER — PROPOFOL 10 MG/ML
INJECTION, EMULSION INTRAVENOUS
Status: DISPENSED
Start: 2022-05-06

## (undated) RX ORDER — VANCOMYCIN HYDROCHLORIDE 1 G/20ML
INJECTION, POWDER, LYOPHILIZED, FOR SOLUTION INTRAVENOUS
Status: DISPENSED
Start: 2022-05-06

## (undated) RX ORDER — ONDANSETRON 2 MG/ML
INJECTION INTRAMUSCULAR; INTRAVENOUS
Status: DISPENSED
Start: 2022-05-06

## (undated) RX ORDER — PROPOFOL 10 MG/ML
INJECTION, EMULSION INTRAVENOUS
Status: DISPENSED
Start: 2022-04-07

## (undated) RX ORDER — VERAPAMIL HYDROCHLORIDE 2.5 MG/ML
INJECTION INTRAVENOUS
Status: DISPENSED
Start: 2025-06-08

## (undated) RX ORDER — BUPIVACAINE HYDROCHLORIDE AND EPINEPHRINE 2.5; 5 MG/ML; UG/ML
INJECTION, SOLUTION INFILTRATION; PERINEURAL
Status: DISPENSED
Start: 2022-04-07

## (undated) RX ORDER — LIDOCAINE HYDROCHLORIDE 20 MG/ML
INJECTION, SOLUTION EPIDURAL; INFILTRATION; INTRACAUDAL; PERINEURAL
Status: DISPENSED
Start: 2022-05-06

## (undated) RX ORDER — GLYCOPYRROLATE 0.2 MG/ML
INJECTION, SOLUTION INTRAMUSCULAR; INTRAVENOUS
Status: DISPENSED
Start: 2022-05-06